# Patient Record
Sex: FEMALE | Race: WHITE | NOT HISPANIC OR LATINO | Employment: OTHER | URBAN - METROPOLITAN AREA
[De-identification: names, ages, dates, MRNs, and addresses within clinical notes are randomized per-mention and may not be internally consistent; named-entity substitution may affect disease eponyms.]

---

## 2017-10-06 ENCOUNTER — LAB VISIT (OUTPATIENT)
Dept: LAB | Facility: HOSPITAL | Age: 27
End: 2017-10-06
Attending: INTERNAL MEDICINE
Payer: MEDICAID

## 2017-10-06 DIAGNOSIS — E03.9 MYXEDEMA HEART DISEASE: Primary | ICD-10-CM

## 2017-10-06 DIAGNOSIS — I51.9 MYXEDEMA HEART DISEASE: Primary | ICD-10-CM

## 2017-10-06 LAB — TSH SERPL DL<=0.005 MIU/L-ACNC: 2.5 UIU/ML

## 2017-10-06 PROCEDURE — 84443 ASSAY THYROID STIM HORMONE: CPT

## 2017-10-06 PROCEDURE — 36415 COLL VENOUS BLD VENIPUNCTURE: CPT

## 2018-02-15 ENCOUNTER — OFFICE VISIT (OUTPATIENT)
Dept: GASTROENTEROLOGY | Facility: CLINIC | Age: 28
End: 2018-02-15
Payer: MEDICAID

## 2018-02-15 VITALS
TEMPERATURE: 98 F | BODY MASS INDEX: 44.98 KG/M2 | WEIGHT: 194.38 LBS | HEART RATE: 63 BPM | RESPIRATION RATE: 16 BRPM | HEIGHT: 55 IN

## 2018-02-15 DIAGNOSIS — K22.2 ESOPHAGEAL STRICTURE: ICD-10-CM

## 2018-02-15 DIAGNOSIS — E66.9 OBESITY, UNSPECIFIED CLASSIFICATION, UNSPECIFIED OBESITY TYPE, UNSPECIFIED WHETHER SERIOUS COMORBIDITY PRESENT: ICD-10-CM

## 2018-02-15 DIAGNOSIS — Q90.9 DOWN SYNDROME: Primary | ICD-10-CM

## 2018-02-15 DIAGNOSIS — K21.9 GASTROESOPHAGEAL REFLUX DISEASE, ESOPHAGITIS PRESENCE NOT SPECIFIED: ICD-10-CM

## 2018-02-15 PROCEDURE — 99215 OFFICE O/P EST HI 40 MIN: CPT | Mod: ,,, | Performed by: INTERNAL MEDICINE

## 2018-02-15 RX ORDER — LEVOTHYROXINE SODIUM 88 UG/1
88 TABLET ORAL
Status: ON HOLD | COMMUNITY
End: 2022-04-15 | Stop reason: HOSPADM

## 2018-03-02 NOTE — PROGRESS NOTES
[facility] Established Patient Visit    Subjective:           PCP: Cornel J. Jeansonne    Referring Provider: No ref. provider found    Chief Complaint: Follow-up       HPI:  Mallory Abadie Bradbury is a 27 y.o. female here for Refractory GERD. Patient has Down syndrome and has a lot of GI symptoms she is expected with this problem. He has no definite history of dysphagia or odynophagia however complaints of food sticking off and on. History is obtained from the parents. She is worse than before and the symptoms seem to come back. Would consider EGD with possible dilation if necessary.      ROS:   Constitutional: No fevers, chills, weight loss  ENT: No allergies, sore throat, rhinorrhea  CV: No chest pain, palpitations, edema  Pulm: No cough, shortness of breath, wheezing  Ophtho: No vision changes  GI: No blood in stools, change in bowel habits, nausea/vomiting  Denies alternating diarrhea/constipation.   Derm: No rash  Heme: No easy bruising or lymphadenopathy  MSK: No arthralgias or myalgias  : No dysuria, hematuria, frequency, polyuria, or flank tenderness  Endo: No hot or cold intolerance  Neuro: No syncope or seizure, or dizziness  Psych: No hallucination, depression or suicidal ideation      Medical History:  has a past medical history of Down's syndrome and Thyroid disease.      Surgical History:  has a past surgical history that includes Tonsillectomy; Appendectomy; and Tongue surgery.    Family History: History reviewed. No pertinent family history.    Social History:   Social History   Substance Use Topics    Smoking status: Never Smoker    Smokeless tobacco: Not on file    Alcohol use No       The patient's social and family histories were reviewed by me and updated in the appropriate section of the electronic medical record.    Allergies:   Review of patient's allergies indicates:   Allergen Reactions    Augmentin [amoxicillin-pot clavulanate] Rash    Bactrim  "[sulfamethoxazole-trimethoprim] Rash    Duricef [cefadroxil] Rash    Keflex [cephalexin] Rash    Rifampin Rash         Medications:   Current Outpatient Prescriptions   Medication Sig Dispense Refill    estradiol cypionate (DEPO-ESTRADIOL) 5 mg/mL injection Inject into the muscle every 28 days.      ibuprofen (ADVIL,MOTRIN) 400 MG tablet Take 400 mg by mouth every 4 (four) hours.      levothyroxine (SYNTHROID) 75 MCG tablet Take 75 mcg by mouth once daily.       levothyroxine (SYNTHROID) 88 MCG tablet Take 88 mcg by mouth once daily.       No current facility-administered medications for this visit.      All medications and past history have been reviewed.        Objective:        Vital Signs:  Pulse 63, temperature 97.9 °F (36.6 °C), resp. rate 16, height 4' 5" (1.346 m), weight 88.2 kg (194 lb 6.4 oz). Body mass index is 48.66 kg/m².    Physical Exam:   General Appearance: Well appearing in no acute distress, well developed, well                 nourished  Head: Normocephalic, without obvious abnormality  Eyes:  Pupils equal, round and reactive to light  Throat: Lips, mucosa, and tongue normal; teeth and gums normal  Lungs: CTA bilaterally in anterior and posterior fields, no wheezes, no crackles  Heart:  Regular rate and rhythm, no murmurs heard  Abdomen: Soft, non tender, non distended with positive bowel sounds in all four quadrants. No hepatosplenomegaly, ascites, or mass. Negative for succusion splash  : female   Extremities: No cyanosis, edema or deformity  Skin: No rash  Neurologic: Normal exam    Labs:  Lab Results   Component Value Date    WBC 6.64 10/24/2013    HGB 14.5 10/24/2013    HCT 43.8 10/24/2013     10/24/2013    ALT 18 10/24/2013    AST 17 10/24/2013     10/24/2013    K 3.9 10/24/2013     10/24/2013    CREATININE 1.0 10/24/2013    BUN 18 10/24/2013    CO2 21 (L) 10/24/2013    TSH 2.503 10/06/2017       Imaging: Reviewed    All lab results and imaging results have been " reviewed and discussed with the patient      Assessment/Plan:    Assessment:       1. Down syndrome    2. Esophageal stricture    3. Gastroesophageal reflux disease, esophagitis presence not specified    4. Obesity, unspecified classification, unspecified obesity type, unspecified whether serious comorbidity present        Plan:       Danae was seen today for follow-up.    Diagnoses and all orders for this visit:    Down syndrome  -     Ambulatory Referral to External Surgery  -     CBC auto differential; Future  -     Comprehensive metabolic panel; Future  -     C-reactive protein; Future  -     TSH; Future  -     Sedimentation rate, manual; Future  -     Phosphorus; Future  -     Magnesium; Future  -     Lipid panel; Future  -     Ferritin; Future  -     CBC auto differential  -     Comprehensive metabolic panel  -     C-reactive protein  -     TSH  -     Sedimentation rate, manual  -     Phosphorus  -     Magnesium  -     Lipid panel  -     Ferritin    Esophageal stricture  -     Ambulatory Referral to External Surgery  -     CBC auto differential; Future  -     Comprehensive metabolic panel; Future  -     C-reactive protein; Future  -     TSH; Future  -     Sedimentation rate, manual; Future  -     Phosphorus; Future  -     Magnesium; Future  -     Lipid panel; Future  -     Ferritin; Future  -     CBC auto differential  -     Comprehensive metabolic panel  -     C-reactive protein  -     TSH  -     Sedimentation rate, manual  -     Phosphorus  -     Magnesium  -     Lipid panel  -     Ferritin    Gastroesophageal reflux disease, esophagitis presence not specified  -     Ambulatory Referral to External Surgery  -     CBC auto differential; Future  -     Comprehensive metabolic panel; Future  -     C-reactive protein; Future  -     TSH; Future  -     Sedimentation rate, manual; Future  -     Phosphorus; Future  -     Magnesium; Future  -     Lipid panel; Future  -     Ferritin; Future  -     CBC auto  differential  -     Comprehensive metabolic panel  -     C-reactive protein  -     TSH  -     Sedimentation rate, manual  -     Phosphorus  -     Magnesium  -     Lipid panel  -     Ferritin    Obesity, unspecified classification, unspecified obesity type, unspecified whether serious comorbidity present  -     Ambulatory Referral to External Surgery  -     CBC auto differential; Future  -     Comprehensive metabolic panel; Future  -     C-reactive protein; Future  -     TSH; Future  -     Sedimentation rate, manual; Future  -     Phosphorus; Future  -     Magnesium; Future  -     Lipid panel; Future  -     Ferritin; Future  -     CBC auto differential  -     Comprehensive metabolic panel  -     C-reactive protein  -     TSH  -     Sedimentation rate, manual  -     Phosphorus  -     Magnesium  -     Lipid panel  -     Ferritin        See HPI    Follow-up in about 3 months (around 5/15/2018).    The plan was discussed with the patient and all questions/concerns have been answered to the patient's satisfaction.        Electronically signed by Violette Garrett MD    This note was dictated using voice recognition software and may contain grammatical errors.

## 2018-05-04 LAB
ALBUMIN SERPL-MCNC: 3.8 G/DL (ref 3.1–4.7)
ALP SERPL-CCNC: 84 IU/L (ref 40–104)
ALT (SGPT): 17 IU/L (ref 3–33)
AST SERPL-CCNC: 19 IU/L (ref 10–40)
B-HCG UR QL: NEGATIVE
BASOPHILS NFR BLD: 0.1 K/UL (ref 0–0.2)
BASOPHILS NFR BLD: 1.2 %
BILIRUB SERPL-MCNC: 0.9 MG/DL (ref 0.3–1)
BUN SERPL-MCNC: 9 MG/DL (ref 8–20)
CALCIUM SERPL-MCNC: 8.9 MG/DL (ref 7.7–10.4)
CHLORIDE: 107 MMOL/L (ref 98–110)
CO2 SERPL-SCNC: 22.2 MMOL/L (ref 22.8–31.6)
CREATININE: 1.12 MG/DL (ref 0.6–1.4)
CRP SERPL-MCNC: 2.49 MG/DL (ref 0–1.4)
EOSINOPHIL NFR BLD: 0.1 K/UL (ref 0–0.7)
EOSINOPHIL NFR BLD: 1.8 %
ERYTHROCYTE [DISTWIDTH] IN BLOOD BY AUTOMATED COUNT: 14 % (ref 11.7–14.9)
FERRITIN SERPL-MCNC: 88 NG/ML (ref 24–162)
GLUCOSE: 75 MG/DL (ref 70–99)
GRAN #: 4.9 K/UL (ref 1.4–6.5)
GRAN%: 66.6 %
HCT VFR BLD AUTO: 45.7 % (ref 36–48)
HGB BLD-MCNC: 14.9 G/DL (ref 12–15)
IMMATURE GRANS (ABS): 0 K/UL (ref 0–1)
IMMATURE GRANULOCYTES: 0.4 %
LYMPH #: 1.8 K/UL (ref 1.2–3.4)
LYMPH%: 24.4 %
MAGNESIUM SERPL-MCNC: 2.1 MG/DL (ref 1.5–2.6)
MCH RBC QN AUTO: 32.6 PG (ref 25–35)
MCHC RBC AUTO-ENTMCNC: 32.6 G/DL (ref 31–36)
MCV RBC AUTO: 100 FL (ref 79–98)
MONO #: 0.4 K/UL (ref 0.1–0.6)
MONO%: 5.6 %
NUCLEATED RBCS: 0 %
PHOSPHATE FLD-MCNC: 4 MG/DL (ref 2.5–4.9)
PLATELET # BLD AUTO: 242 K/UL (ref 140–440)
PMV BLD AUTO: 9.4 FL (ref 8.8–12.7)
POTASSIUM SERPL-SCNC: 4 MMOL/L (ref 3.5–5)
PROT SERPL-MCNC: 7.2 G/DL (ref 6–8.2)
RBC # BLD AUTO: 4.57 M/UL (ref 3.5–5.5)
SODIUM: 138 MMOL/L (ref 134–144)
TSH SERPL DL<=0.005 MIU/L-ACNC: 3.6 ULU/ML (ref 0.3–5.6)
WBC # BLD AUTO: 7.3 K/UL (ref 5–10)

## 2018-06-19 ENCOUNTER — OFFICE VISIT (OUTPATIENT)
Dept: GASTROENTEROLOGY | Facility: CLINIC | Age: 28
End: 2018-06-19
Payer: MEDICAID

## 2018-06-19 VITALS
WEIGHT: 194.38 LBS | HEART RATE: 63 BPM | BODY MASS INDEX: 44.98 KG/M2 | SYSTOLIC BLOOD PRESSURE: 124 MMHG | TEMPERATURE: 98 F | HEIGHT: 55 IN | DIASTOLIC BLOOD PRESSURE: 64 MMHG

## 2018-06-19 DIAGNOSIS — R13.10 DYSPHAGIA, UNSPECIFIED TYPE: Primary | ICD-10-CM

## 2018-06-19 DIAGNOSIS — E66.9 OBESITY, UNSPECIFIED CLASSIFICATION, UNSPECIFIED OBESITY TYPE, UNSPECIFIED WHETHER SERIOUS COMORBIDITY PRESENT: ICD-10-CM

## 2018-06-19 DIAGNOSIS — K21.9 GASTROESOPHAGEAL REFLUX DISEASE, ESOPHAGITIS PRESENCE NOT SPECIFIED: ICD-10-CM

## 2018-06-19 DIAGNOSIS — Q90.9 DOWN SYNDROME: ICD-10-CM

## 2018-06-19 PROCEDURE — 99215 OFFICE O/P EST HI 40 MIN: CPT | Mod: ,,, | Performed by: INTERNAL MEDICINE

## 2018-06-19 RX ORDER — ESCITALOPRAM OXALATE 20 MG/1
1 TABLET ORAL DAILY
COMMUNITY

## 2018-06-19 NOTE — PROGRESS NOTES
[facility] Established Patient Visit    Subjective:           PCP: Cornel J. Jeansonne    Chief Complaint: Follow-up (lab results)       HPI:  Mallory Abadie Bradbury is a 28 y.o. female here for       ROS:   Constitutional: No fevers, chills, weight loss  ENT: No allergies, sore throat, rhinorrhea  CV: No chest pain, palpitations, edema  Pulm: No cough, shortness of breath, wheezing  Ophtho: No vision changes  GI: No blood in stools, change in bowel habits, nausea/vomiting  Denies alternating diarrhea/constipation.   Derm: No rash  Heme: No easy bruising or lymphadenopathy  MSK: No arthralgias or myalgias  : No dysuria, hematuria, frequency, polyuria, or flank tenderness  Endo: No hot or cold intolerance  Neuro: No syncope or seizure, or dizziness  Psych: No hallucination, depression or suicidal ideation      Medical History:  has a past medical history of Down's syndrome and Thyroid disease.      Surgical History:  has a past surgical history that includes Tonsillectomy; Appendectomy; and Tongue surgery.    Family History: History reviewed. No pertinent family history.    Social History:   Social History   Substance Use Topics    Smoking status: Never Smoker    Smokeless tobacco: Not on file    Alcohol use No       The patient's social and family histories were reviewed by me and updated in the appropriate section of the electronic medical record.    Allergies:   Review of patient's allergies indicates:   Allergen Reactions    Amoxicillin Hives    Augmentin [amoxicillin-pot clavulanate] Rash    Bactrim [sulfamethoxazole-trimethoprim] Rash    Duricef [cefadroxil] Rash    Keflex [cephalexin] Rash    Rifampin Rash         Medications:   Current Outpatient Prescriptions   Medication Sig Dispense Refill    escitalopram oxalate (LEXAPRO) 20 MG tablet Take 1 tablet by mouth once daily.      estradiol cypionate (DEPO-ESTRADIOL) 5 mg/mL injection Inject into the muscle every 28 days.       "ibuprofen (ADVIL,MOTRIN) 400 MG tablet Take 400 mg by mouth every 4 (four) hours.      levothyroxine (SYNTHROID) 75 MCG tablet Take 75 mcg by mouth once daily.       levothyroxine (SYNTHROID) 88 MCG tablet Take 88 mcg by mouth once daily.       No current facility-administered medications for this visit.      All medications and past history have been reviewed.        Objective:        Vital Signs:  Blood pressure 124/64, pulse 63, temperature 97.9 °F (36.6 °C), height 4' 5" (1.346 m), weight 88.2 kg (194 lb 6.4 oz). Body mass index is 48.66 kg/m².    Physical Exam:   General Appearance: Well appearing in no acute distress, well developed, well                nourished  Head: Normocephalic, without obvious abnormality  Eyes:  Pupils equal, round and reactive to light  Throat: Lips, mucosa, and tongue normal; teeth and gums normal  Lungs: CTA bilaterally in anterior and posterior fields, no wheezes, no crackles  Heart:  Regular rate and rhythm, no murmurs heard  Abdomen: Soft, non tender, non distended with positive bowel sounds in all four quadrants. No hepatosplenomegaly, ascites, or mass. Negative for succusion splash  : female  No hernia  Extremities: No cyanosis, edema or deformity  Skin: No rash  Neurologic: Normal exam    Labs:  Lab Results   Component Value Date    WBC 7.3 05/04/2018    HGB 14.9 05/04/2018    HCT 45.7 05/04/2018     05/04/2018    ALT 18 10/24/2013    AST 19 05/04/2018     05/04/2018    K 4.0 05/04/2018     05/04/2018    CREATININE 1.12 05/04/2018    BUN 9 05/04/2018    CO2 22.2 (L) 05/04/2018    TSH 3.60 05/04/2018       Imaging:     All lab results and imaging results have been reviewed and discussed with the patient      Assessment:       1. Dysphagia, unspecified type    2. Gastroesophageal reflux disease, esophagitis presence not specified    3. Down syndrome    4. Obesity, unspecified classification, unspecified obesity type, unspecified whether serious comorbidity " present        Plan:       Danae was seen today for follow-up.    Diagnoses and all orders for this visit:    Dysphagia, unspecified type  -     FL Upper GI With Small Bowel    Gastroesophageal reflux disease, esophagitis presence not specified  -     FL Upper GI With Small Bowel    Down syndrome  -     FL Upper GI With Small Bowel    Obesity, unspecified classification, unspecified obesity type, unspecified whether serious comorbidity present  -     FL Upper GI With Small Bowel        See HPI    No Follow-up on file.    The plan was discussed with the patient and all questions/concerns have been answered to the patient's satisfaction.        Electronically signed by Violette Garrett MD    This note was dictated using voice recognition software and may contain grammatical errors.

## 2018-07-10 ENCOUNTER — TELEPHONE (OUTPATIENT)
Dept: GASTROENTEROLOGY | Facility: CLINIC | Age: 28
End: 2018-07-10

## 2018-07-10 NOTE — TELEPHONE ENCOUNTER
----- Message from Hailee Cavanaugh sent at 7/10/2018  4:38 PM CDT -----  Contact: mother  pts mom said dr goodwin wants her to have another egd w/anes now.  Dr goodwin said he wants upper gi done.  I have to call the mother back.  What does dr goodwin want the patient to have done?

## 2018-07-16 ENCOUNTER — TELEPHONE (OUTPATIENT)
Dept: GASTROENTEROLOGY | Facility: CLINIC | Age: 28
End: 2018-07-16

## 2018-07-16 NOTE — TELEPHONE ENCOUNTER
----- Message from Jazzmine Mejia sent at 7/16/2018  1:15 PM CDT -----  Contact: giorgi shirley - father - [phone]  janice hernandez setting up appt - please call giorgi shirley - father - [phone]

## 2018-09-25 LAB
BACTERIA UR CULT: ABNORMAL
BACTERIA UR CULT: ABNORMAL
OTHER ANTIBIOTIC SUSC ISLT: ABNORMAL

## 2018-09-28 ENCOUNTER — LAB VISIT (OUTPATIENT)
Dept: LAB | Facility: HOSPITAL | Age: 28
End: 2018-09-28
Attending: INTERNAL MEDICINE
Payer: MEDICAID

## 2018-09-28 DIAGNOSIS — E03.9 MYXEDEMA HEART DISEASE: Primary | ICD-10-CM

## 2018-09-28 DIAGNOSIS — I51.9 MYXEDEMA HEART DISEASE: Primary | ICD-10-CM

## 2018-09-28 LAB — TSH SERPL DL<=0.005 MIU/L-ACNC: 2.29 UIU/ML

## 2018-09-28 PROCEDURE — 84443 ASSAY THYROID STIM HORMONE: CPT

## 2018-09-28 PROCEDURE — 36415 COLL VENOUS BLD VENIPUNCTURE: CPT

## 2018-10-04 ENCOUNTER — OFFICE VISIT (OUTPATIENT)
Dept: GASTROENTEROLOGY | Facility: CLINIC | Age: 28
End: 2018-10-04
Payer: MEDICAID

## 2018-10-04 VITALS
RESPIRATION RATE: 18 BRPM | TEMPERATURE: 99 F | WEIGHT: 190.63 LBS | DIASTOLIC BLOOD PRESSURE: 64 MMHG | HEIGHT: 55 IN | BODY MASS INDEX: 44.12 KG/M2 | HEART RATE: 61 BPM | SYSTOLIC BLOOD PRESSURE: 120 MMHG

## 2018-10-04 DIAGNOSIS — E07.9 THYROID DISEASE: ICD-10-CM

## 2018-10-04 DIAGNOSIS — Q90.9 DOWN SYNDROME: ICD-10-CM

## 2018-10-04 DIAGNOSIS — K22.2 ESOPHAGEAL STRICTURE: Primary | ICD-10-CM

## 2018-10-04 DIAGNOSIS — E66.9 OBESITY, UNSPECIFIED CLASSIFICATION, UNSPECIFIED OBESITY TYPE, UNSPECIFIED WHETHER SERIOUS COMORBIDITY PRESENT: ICD-10-CM

## 2018-10-04 PROCEDURE — 99215 OFFICE O/P EST HI 40 MIN: CPT | Mod: ,,, | Performed by: INTERNAL MEDICINE

## 2018-10-04 RX ORDER — OMEPRAZOLE 40 MG/1
40 CAPSULE, DELAYED RELEASE ORAL DAILY
COMMUNITY
End: 2020-07-09 | Stop reason: ALTCHOICE

## 2018-10-04 NOTE — PROGRESS NOTES
[facility] Established Patient Visit    Subjective:           PCP: Cornel J. Jeansonne    Chief Complaint: Follow-up       HPI:  Mallory Abadie Bradbury is a 28 y.o. female here for evaluation of dysphagia. Has had a good response to the previous dilation. She may need this periodically as the symptoms are coming back. She gets food stuck periodically and she had to go to the emergency room. She has been recently treated for UTI. Consider repeat dilation in future.    ROS:   Constitutional: No fevers, chills, weight loss  ENT: No allergies, sore throat, rhinorrhea  CV: No chest pain, palpitations, edema  Pulm: No cough, shortness of breath, wheezing  Ophtho: No vision changes  GI: No blood in stools, change in bowel habits, nausea/vomiting  Denies alternating diarrhea/constipation.   Derm: No rash  Heme: No easy bruising or lymphadenopathy  MSK: No arthralgias or myalgias  : No dysuria, hematuria, frequency, polyuria, or flank tenderness  Endo: No hot or cold intolerance  Neuro: No syncope or seizure, or dizziness  Psych: No hallucination, depression or suicidal ideation      Medical History:  has a past medical history of Down's syndrome and Thyroid disease.      Surgical History:  has a past surgical history that includes Tonsillectomy; Appendectomy; and Tongue surgery.    Family History: No family history on file.    Social History:   Social History     Tobacco Use    Smoking status: Never Smoker   Substance Use Topics    Alcohol use: No    Drug use: Not on file       The patient's social and family histories were reviewed by me and updated in the appropriate section of the electronic medical record.    Allergies:   Review of patient's allergies indicates:   Allergen Reactions    Amoxicillin Hives    Augmentin [amoxicillin-pot clavulanate] Rash    Bactrim [sulfamethoxazole-trimethoprim] Rash    Duricef [cefadroxil] Rash    Keflex [cephalexin] Rash    Rifampin Rash         Medications:  "  Current Outpatient Medications   Medication Sig Dispense Refill    escitalopram oxalate (LEXAPRO) 20 MG tablet Take 1 tablet by mouth once daily.      estradiol cypionate (DEPO-ESTRADIOL) 5 mg/mL injection Inject into the muscle every 28 days.      ibuprofen (ADVIL,MOTRIN) 400 MG tablet Take 400 mg by mouth every 4 (four) hours.      levothyroxine (SYNTHROID) 75 MCG tablet Take 75 mcg by mouth once daily.       levothyroxine (SYNTHROID) 88 MCG tablet Take 88 mcg by mouth once daily.       No current facility-administered medications for this visit.      All medications and past history have been reviewed.        Objective:        Vital Signs:  Blood pressure 120/64, pulse 61, temperature 99.1 °F (37.3 °C), resp. rate 18, height 4' 5" (1.346 m), weight 86.5 kg (190 lb 9.6 oz). Body mass index is 47.71 kg/m².    Physical Exam:   General Appearance: Well appearing in no acute distress, well developed, well                nourished  Head: Normocephalic, without obvious abnormality  Eyes:  Pupils equal, round and reactive to light  Throat: Lips, mucosa, and tongue normal; teeth and gums normal  Lungs: CTA bilaterally in anterior and posterior fields, no wheezes, no crackles  Heart:  Regular rate and rhythm, no murmurs heard  Abdomen: Soft, non tender, non distended with positive bowel sounds in all four quadrants. No hepatosplenomegaly, ascites, or mass. Negative for succusion splash  : female   Extremities: No cyanosis, edema or deformity  Skin: No rash  Neurologic: Normal exam    Labs:  Lab Results   Component Value Date    WBC 7.3 05/04/2018    HGB 14.9 05/04/2018    HCT 45.7 05/04/2018     05/04/2018    ALT 18 10/24/2013    AST 19 05/04/2018     05/04/2018    K 4.0 05/04/2018     05/04/2018    CREATININE 1.12 05/04/2018    BUN 9 05/04/2018    CO2 22.2 (L) 05/04/2018    TSH 2.293 09/28/2018       Imaging:     All lab results and imaging results have been reviewed and discussed with the " patient      Assessment:       No diagnosis found.      1. Esophageal stricture  2. Down's syndrome  See visit diagnoses  Plan:       There are no diagnoses linked to this encounter.    See HPI    No Follow-up on file.    The plan was discussed with the patient and all questions/concerns have been answered to the patient's satisfaction.        Electronically signed by Violette Garrett MD    This note was dictated using voice recognition software and may contain grammatical errors.

## 2018-10-08 ENCOUNTER — TELEPHONE (OUTPATIENT)
Dept: GASTROENTEROLOGY | Facility: CLINIC | Age: 28
End: 2018-10-08

## 2018-10-08 NOTE — TELEPHONE ENCOUNTER
Deedee Bradford MA             PER RUTH [place] [employer] IS RESPONSIBLE FOR MEDICAL, PT HAS [facility] ONLY FOR BEHAVIORAL , PER RUTH ADVISED THE  EGD IS APPRVED ONLY IF DONE OUT PT RUTH ADVISED THE  [address] ANETHESIA PORTION  IS NOT COVERED , PLEASE ADVISE MEMBER OR FAMILY AS WELL AS DR AGUDELO          Received a call back from Deedee. Patient medicaid is [employer] which is the [place]icaid. The EGD will be cover but not the anesthesia. Will patient be able to have procedure done without it?

## 2018-10-08 NOTE — TELEPHONE ENCOUNTER
Deedee Emery Staff             Per Bella @ [facility] called and ad Dr Garrett is out if network with pt Ins and Dr Garrett cannot be the servicing and rendering MD, Nawaf Blanco advised  to have member call her PCP to request a out of network provider or Dr Garrett for the services requested , this message was also sent to Dr Gerry rodrigez in basket message          I spoke with Lizz in the pre service department at [facility] and she explained to me that [facility] is only for mental health. Patient has to use [place] medicaid for all other services. I called pre service here and Deedee will run it through with the correct number.

## 2018-11-05 NOTE — TELEPHONE ENCOUNTER
Patients mom (Mallory) called on 11/2/18.  Would like to know if the Egd on 11/9/18 is all set.  Will the anesthesia be covered?

## 2018-11-06 NOTE — TELEPHONE ENCOUNTER
BRAIN SPOKE TO MEDICAID ON 11/6/18.  THE CORRECT ID# IS [id number].  DR AGUDELO NEEDS TO CALL [phone] AND SPEAK TO SOMEONE IN PROFESSIONAL [employer]GRAMS FOR THE PEER TO PEER.

## 2018-11-07 NOTE — TELEPHONE ENCOUNTER
I left out a number on the previous message.  The correct ID is [id number].  Dr Garrett called the [facility] Dept, no one answered.  He then called Provider Support for medicaid at [phone].  We spoke to Lian who told us that as long as the procedure (EGD) was done as outpatient, the anesthesia would be covered.  Ref # is LIAN 11/7/2018.

## 2018-11-09 LAB
BASOPHILS NFR BLD: 0.1 K/UL (ref 0–0.2)
BASOPHILS NFR BLD: 1.6 %
BUN SERPL-MCNC: 8 MG/DL (ref 8–20)
CALCIUM SERPL-MCNC: 9 MG/DL (ref 7.7–10.4)
CHLORIDE: 105 MMOL/L (ref 98–110)
CO2 SERPL-SCNC: 26.2 MMOL/L (ref 22.8–31.6)
CREATININE: 1.23 MG/DL (ref 0.6–1.4)
EOSINOPHIL NFR BLD: 0.1 K/UL (ref 0–0.7)
EOSINOPHIL NFR BLD: 1.1 %
ERYTHROCYTE [DISTWIDTH] IN BLOOD BY AUTOMATED COUNT: 13.4 % (ref 11.7–14.9)
GLUCOSE: 85 MG/DL (ref 70–99)
GRAN #: 3.6 K/UL (ref 1.4–6.5)
GRAN%: 58.5 %
HCT VFR BLD AUTO: 46.5 % (ref 36–48)
HGB BLD-MCNC: 15.2 G/DL (ref 12–15)
IMMATURE GRANS (ABS): 0 K/UL (ref 0–1)
IMMATURE GRANULOCYTES: 0.3 %
LYMPH #: 2 K/UL (ref 1.2–3.4)
LYMPH%: 32 %
MCH RBC QN AUTO: 33.4 PG (ref 25–35)
MCHC RBC AUTO-ENTMCNC: 32.7 G/DL (ref 31–36)
MCV RBC AUTO: 102.2 FL (ref 79–98)
MONO #: 0.4 K/UL (ref 0.1–0.6)
MONO%: 6.5 %
NUCLEATED RBCS: 0 %
PLATELET # BLD AUTO: 257 K/UL (ref 140–440)
PMV BLD AUTO: 9.4 FL (ref 8.8–12.7)
POTASSIUM SERPL-SCNC: 4.1 MMOL/L (ref 3.5–5)
RBC # BLD AUTO: 4.55 M/UL (ref 3.5–5.5)
SODIUM: 139 MMOL/L (ref 134–144)
WBC # BLD AUTO: 6.2 K/UL (ref 5–10)

## 2018-12-23 ENCOUNTER — CLINICAL SUPPORT (OUTPATIENT)
Dept: URGENT CARE | Facility: CLINIC | Age: 28
End: 2018-12-23
Payer: MEDICAID

## 2018-12-23 VITALS
SYSTOLIC BLOOD PRESSURE: 105 MMHG | DIASTOLIC BLOOD PRESSURE: 65 MMHG | TEMPERATURE: 98 F | HEIGHT: 63 IN | WEIGHT: 190 LBS | RESPIRATION RATE: 14 BRPM | BODY MASS INDEX: 33.66 KG/M2 | OXYGEN SATURATION: 98 % | HEART RATE: 64 BPM

## 2018-12-23 DIAGNOSIS — H65.92 OME (OTITIS MEDIA WITH EFFUSION), LEFT: Primary | ICD-10-CM

## 2018-12-23 PROCEDURE — 99204 OFFICE O/P NEW MOD 45 MIN: CPT | Mod: S$GLB,,, | Performed by: NURSE PRACTITIONER

## 2018-12-23 RX ORDER — CETIRIZINE HYDROCHLORIDE 1 MG/ML
SOLUTION ORAL DAILY
COMMUNITY
End: 2022-04-15

## 2018-12-23 RX ORDER — AZITHROMYCIN 250 MG/1
TABLET, FILM COATED ORAL
Qty: 6 TABLET | Refills: 0 | Status: SHIPPED | OUTPATIENT
Start: 2018-12-23 | End: 2019-11-25 | Stop reason: CLARIF

## 2018-12-23 RX ORDER — MUPIROCIN 20 MG/G
OINTMENT TOPICAL
Qty: 22 G | Refills: 1 | Status: SHIPPED | OUTPATIENT
Start: 2018-12-23 | End: 2019-11-25 | Stop reason: CLARIF

## 2018-12-23 NOTE — PROGRESS NOTES
"Subjective:       Patient ID: Mallory Abadie Bradbury is a 28 y.o. female.    Vitals:  height is 5' 3" (1.6 m) and weight is 86.2 kg (190 lb). Her oral temperature is 97.7 °F (36.5 °C). Her blood pressure is 105/65 and her pulse is 64. Her respiration is 14 and oxygen saturation is 98%.     Chief Complaint: No chief complaint on file.    Pt presents with parents at  for left ear pain. Parents states pt was c/o left ear pain approx 1 month ago. Pain improved but over the past 2 days pt is stating her ear is hurting. Pt unable to provide specific details regarding ear pain. Parents deny f/c/n/v.         Constitution: Negative for chills, sweating, fatigue and fever.   HENT: Positive for ear pain. Negative for congestion, sinus pain, sinus pressure, sore throat and voice change.    Neck: Negative for painful lymph nodes.   Eyes: Negative for eye redness.   Respiratory: Negative for chest tightness, cough, sputum production, bloody sputum, COPD, shortness of breath, stridor, wheezing and asthma.    Gastrointestinal: Negative for nausea and vomiting.   Musculoskeletal: Negative for muscle ache.   Skin: Negative for rash.   Allergic/Immunologic: Negative for seasonal allergies and asthma.   Hematologic/Lymphatic: Negative for swollen lymph nodes.       Objective:      Physical Exam   Constitutional: She is oriented to person, place, and time. She appears well-developed and well-nourished. She is cooperative.  Non-toxic appearance. She does not appear ill. No distress.   HENT:   Head: Normocephalic and atraumatic.   Right Ear: Hearing, tympanic membrane, external ear and ear canal normal.   Left Ear: Hearing, external ear and ear canal normal. Tympanic membrane is erythematous.   Nose: Nose normal. No mucosal edema, rhinorrhea or nasal deformity. No epistaxis. Right sinus exhibits no maxillary sinus tenderness and no frontal sinus tenderness. Left sinus exhibits no maxillary sinus tenderness and no frontal sinus " tenderness.   Mouth/Throat: Uvula is midline, oropharynx is clear and moist and mucous membranes are normal. No trismus in the jaw. Normal dentition. No uvula swelling. No posterior oropharyngeal erythema.   bilat cerumen   Eyes: Conjunctivae and lids are normal. No scleral icterus.   Sclera clear bilat   Neck: Trachea normal, full passive range of motion without pain and phonation normal. Neck supple.   Cardiovascular: Normal rate, regular rhythm, normal heart sounds, intact distal pulses and normal pulses.   Pulmonary/Chest: Effort normal and breath sounds normal. No respiratory distress.   Abdominal: Soft. Normal appearance and bowel sounds are normal. She exhibits no distension. There is no tenderness.   Musculoskeletal: Normal range of motion. She exhibits no edema or deformity.   Neurological: She is alert and oriented to person, place, and time. She exhibits normal muscle tone. Coordination normal.   Skin: Skin is warm, dry and intact. She is not diaphoretic. No pallor.   Psychiatric: She has a normal mood and affect. Her speech is normal and behavior is normal. Judgment and thought content normal. Cognition and memory are normal.   Nursing note and vitals reviewed.      Assessment:       1. OME (otitis media with effusion), left        Plan:         OME (otitis media with effusion), left    Other orders  -     azithromycin (Z-PHILIP) 250 MG tablet; Take 2 tablets by mouth on day 1; Take 1 tablet by mouth on days 2-5  Dispense: 6 tablet; Refill: 0

## 2018-12-23 NOTE — PATIENT INSTRUCTIONS
Middle Ear Infection (Adult)  You have an infection of the middle ear, the space behind the eardrum. This is also called acute otitis media (AOM). Sometimes it is caused by the common cold. This is because congestion can block the internal passage (eustachian tube) that drains fluid from the middle ear. When the middle ear fills with fluid, bacteria can grow there and cause an infection. Oral antibiotics are used to treat this illness, not ear drops. Symptoms usually start to improve within 1 to 2 days of treatment.    Home care  The following are general care guidelines:  · Finish all of the antibiotic medicine given, even though you may feel better after the first few days.  · You may use over-the-counter medicine, such as acetaminophen or ibuprofen, to control pain and fever, unless something else was prescribed. If you have chronic liver or kidney disease or have ever had a stomach ulcer or gastrointestinal bleeding, talk with your healthcare provider before using these medicines. Do not give aspirin to anyone under 18 years of age who has a fever. It may cause severe illness or death.  Follow-up care  Follow up with your healthcare provider, or as advised, in 2 weeks if all symptoms have not gotten better, or if hearing doesn't go back to normal within 1 month.  When to seek medical advice  Call your healthcare provider right away if any of these occur:  · Ear pain gets worse or does not improve after 3 days of treatment  · Unusual drowsiness or confusion  · Neck pain, stiff neck, or headache  · Fluid or blood draining from the ear canal  · Fever of 100.4°F (38°C) or as advised   · Seizure  Date Last Reviewed: 6/1/2016  © 6450-7910 [employer]. [address] [place] [address], [place] [address]. All rights reserved. This information is not intended as a substitute for professional medical care. Always follow your healthcare professional's instructions.

## 2019-05-15 ENCOUNTER — TELEPHONE (OUTPATIENT)
Dept: GASTROENTEROLOGY | Facility: CLINIC | Age: 29
End: 2019-05-15

## 2019-05-15 NOTE — TELEPHONE ENCOUNTER
----- Message from Hailee Cavanaugh sent at 5/15/2019  8:51 AM CDT -----  Contact: tom-mom  Ms. Tejada feels that its time for Danae to have her esophagus stretched again.  Last EGD was 11/9/18 and last office visit was 10/4/18.  Can this be scheduled or does Dr. Garrett want to see her first.  First available appt is the end of June.

## 2019-05-30 ENCOUNTER — OFFICE VISIT (OUTPATIENT)
Dept: PODIATRY | Facility: CLINIC | Age: 29
End: 2019-05-30
Payer: MEDICAID

## 2019-05-30 VITALS
WEIGHT: 198 LBS | BODY MASS INDEX: 35.08 KG/M2 | SYSTOLIC BLOOD PRESSURE: 116 MMHG | RESPIRATION RATE: 22 BRPM | DIASTOLIC BLOOD PRESSURE: 74 MMHG | HEIGHT: 63 IN

## 2019-05-30 DIAGNOSIS — L84 CORN OR CALLUS: Primary | ICD-10-CM

## 2019-05-30 DIAGNOSIS — M79.672 BILATERAL FOOT PAIN: ICD-10-CM

## 2019-05-30 DIAGNOSIS — M79.671 BILATERAL FOOT PAIN: ICD-10-CM

## 2019-05-30 PROCEDURE — 99213 OFFICE O/P EST LOW 20 MIN: CPT | Mod: ,,, | Performed by: PODIATRIST

## 2019-05-30 PROCEDURE — 99213 PR OFFICE/OUTPT VISIT, EST, LEVL III, 20-29 MIN: ICD-10-PCS | Mod: ,,, | Performed by: PODIATRIST

## 2019-05-30 PROCEDURE — 99070 SPECIAL SUPPLIES PHYS/QHP: CPT | Mod: CSM,,, | Performed by: PODIATRIST

## 2019-05-30 PROCEDURE — 99070 PR SPECIAL SUPPLIES: ICD-10-PCS | Mod: CSM,,, | Performed by: PODIATRIST

## 2019-05-30 RX ORDER — HYDROCORTISONE 25 MG/G
CREAM TOPICAL
Refills: 1 | Status: ON HOLD | COMMUNITY
Start: 2019-05-23 | End: 2023-10-01 | Stop reason: HOSPADM

## 2019-05-30 RX ORDER — MEDROXYPROGESTERONE ACETATE 150 MG/ML
INJECTION, SUSPENSION INTRAMUSCULAR
Refills: 2 | COMMUNITY
Start: 2019-03-26

## 2019-05-30 NOTE — PROGRESS NOTES
[address] [facility] [address]  [place] [address]  Phone: [phone]   [phone]    Patient's PCP:Cornel J. Jeansonne, MD  Referring Provider: No ref. provider found    Subjective:      Chief Complaint:: Routine Foot Care (bilateral callouses)    HPI Mallory Abadie Bradbury is a 29 y.o. female who presents with a complaint of  Bilateral painful callouses lasting for months. Onset of the symptoms was how patient walks.  Current symptoms include pain.  Aggravating factors are bearing weight. Treatment to date have included periodic debridement. Patients rates pain 5/10 on pain scale.        Vitals:    05/30/19 1542   BP: 116/74   Resp: (!) 22     Shoe Size: 2     Past Surgical History:   Procedure Laterality Date    APPENDECTOMY      esposgus      TONGUE SURGERY      TONSILLECTOMY       Past Medical History:   Diagnosis Date    Down's syndrome     Thyroid disease      No family history on file.     Social History:   Marital Status: Single  Alcohol History:  reports that she does not drink alcohol.  Tobacco History:  reports that she has never smoked. She does not have any smokeless tobacco history on file.  Drug History:  has no drug history on file.    Review of patient's allergies indicates:   Allergen Reactions    Amoxicillin Hives    Augmentin [amoxicillin-pot clavulanate] Rash    Bactrim [sulfamethoxazole-trimethoprim] Rash    Duricef [cefadroxil] Rash    Keflex [cephalexin] Rash    Rifampin Rash       Current Outpatient Medications   Medication Sig Dispense Refill    azithromycin (Z-PHILIP) 250 MG tablet Take 2 tablets by mouth on day 1; Take 1 tablet by mouth on days 2-5 6 tablet 0    cetirizine (ZYRTEC) 1 mg/mL syrup Take by mouth once daily.      escitalopram oxalate (LEXAPRO) 20 MG tablet Take 1 tablet by mouth once daily.      estradiol cypionate (DEPO-ESTRADIOL) 5 mg/mL injection Inject into the muscle every 28 days.      hydrocortisone 2.5 % cream EMEKA SML AMT EXT AA BID  1     ibuprofen (ADVIL,MOTRIN) 400 MG tablet Take 400 mg by mouth every 4 (four) hours.      levothyroxine (SYNTHROID) 75 MCG tablet Take 75 mcg by mouth once daily.       levothyroxine (SYNTHROID) 88 MCG tablet Take 88 mcg by mouth once daily.      medroxyPROGESTERone (DEPO-PROVERA) 150 mg/mL injection   2    mupirocin (BACTROBAN) 2 % ointment Apply to affected area 3 times daily 22 g 1    omeprazole (PRILOSEC) 40 MG capsule Take 40 mg by mouth once daily.       No current facility-administered medications for this visit.        Review of Systems   Constitutional: Negative for chills, fatigue, fever and unexpected weight change.   HENT: Negative for hearing loss and trouble swallowing.    Eyes: Negative for photophobia and visual disturbance.   Respiratory: Negative for cough, shortness of breath and wheezing.    Cardiovascular: Negative for chest pain, palpitations and leg swelling.   Gastrointestinal: Negative for abdominal pain and nausea.   Genitourinary: Negative for dysuria and frequency.   Musculoskeletal: Positive for arthralgias and back pain. Negative for joint swelling.   Skin: Negative for rash.   Neurological: Positive for weakness. Negative for tremors, seizures, numbness and headaches.   Hematological: Does not bruise/bleed easily.         Objective:        Physical Exam:   Foot Exam    General  General Appearance: appears stated age and healthy   Orientation: alert and oriented to person, place, and time   Affect: appropriate   Gait: unimpaired       Right Foot/Ankle     Neurovascular  Dorsalis pedis: 2+  Posterior tibial: 2+      Left Foot/Ankle      Neurovascular  Dorsalis pedis: 2+  Posterior tibial: 2+          Physical Exam   Cardiovascular:   Pulses:       Dorsalis pedis pulses are 2+ on the right side, and 2+ on the left side.        Posterior tibial pulses are 2+ on the right side, and 2+ on the left side.   Musculoskeletal:        Feet:        Imaging:            Assessment:       1. Corn or  callus - Right Foot    2. Bilateral foot pain      Plan:   Corn or callus - Right Foot    Bilateral foot pain    Today we discussed the treatment and patient family will try the wart stick at home and periodic pedicure of the area and will return as needed.  Follow up if symptoms worsen or fail to improve.    Procedures - None    Counseling:    benign skin lesion:  I explained the lesion to the pt. and the treatment options of 1)  periodic debridement and off loading of the lesion.  2)  Canthrone treatment plan,  3)  Curretage of the lesion.  I explained there was no guarantee with any of the treatments that the lesion would comletely resolve or not reoccur.  I provided patient education verbally regarding:   Patient diagnosis, treatment options, as well as alternatives, risks, and benefits.     This note was created using Dragon voice recognition software that occasionally misinterpreted phrases or words.

## 2019-07-18 ENCOUNTER — CLINICAL SUPPORT (OUTPATIENT)
Dept: URGENT CARE | Facility: CLINIC | Age: 29
End: 2019-07-18
Payer: MEDICAID

## 2019-07-18 VITALS
SYSTOLIC BLOOD PRESSURE: 93 MMHG | DIASTOLIC BLOOD PRESSURE: 63 MMHG | TEMPERATURE: 99 F | OXYGEN SATURATION: 99 % | HEART RATE: 63 BPM | RESPIRATION RATE: 12 BRPM

## 2019-07-18 DIAGNOSIS — R21 RASH OF BACK: ICD-10-CM

## 2019-07-18 DIAGNOSIS — M54.9 BACK PAIN, UNSPECIFIED BACK LOCATION, UNSPECIFIED BACK PAIN LATERALITY, UNSPECIFIED CHRONICITY: Primary | ICD-10-CM

## 2019-07-18 DIAGNOSIS — M25.551 HIP PAIN, ACUTE, RIGHT: ICD-10-CM

## 2019-07-18 LAB
BILIRUB UR QL STRIP: NEGATIVE
GLUCOSE UR QL STRIP: NEGATIVE
KETONES UR QL STRIP: NEGATIVE
LEUKOCYTE ESTERASE UR QL STRIP: POSITIVE
PH, POC UA: 5
POC BLOOD, URINE: NEGATIVE
POC NITRATES, URINE: NEGATIVE
PROT UR QL STRIP: NEGATIVE
SP GR UR STRIP: 1.02 (ref 1–1.03)
UROBILINOGEN UR STRIP-ACNC: 1 (ref 0.1–1.1)

## 2019-07-18 PROCEDURE — 99214 OFFICE O/P EST MOD 30 MIN: CPT | Mod: 25,S$GLB,, | Performed by: NURSE PRACTITIONER

## 2019-07-18 PROCEDURE — 81003 POCT URINALYSIS, DIPSTICK, AUTOMATED, W/O SCOPE: ICD-10-PCS | Mod: QW,S$GLB,, | Performed by: NURSE PRACTITIONER

## 2019-07-18 PROCEDURE — 99214 PR OFFICE/OUTPT VISIT, EST, LEVL IV, 30-39 MIN: ICD-10-PCS | Mod: 25,S$GLB,, | Performed by: NURSE PRACTITIONER

## 2019-07-18 PROCEDURE — 72170 PR  X-RAY PELVIS 1/2 VW: ICD-10-PCS | Mod: S$GLB,,, | Performed by: NURSE PRACTITIONER

## 2019-07-18 PROCEDURE — 72170 X-RAY EXAM OF PELVIS: CPT | Mod: S$GLB,,, | Performed by: NURSE PRACTITIONER

## 2019-07-18 PROCEDURE — 81003 URINALYSIS AUTO W/O SCOPE: CPT | Mod: QW,S$GLB,, | Performed by: NURSE PRACTITIONER

## 2019-07-18 RX ORDER — CLOTRIMAZOLE AND BETAMETHASONE DIPROPIONATE 10; .64 MG/G; MG/G
CREAM TOPICAL
Qty: 15 G | Refills: 2 | Status: SHIPPED | OUTPATIENT
Start: 2019-07-18 | End: 2019-11-25 | Stop reason: CLARIF

## 2019-07-18 NOTE — PROGRESS NOTES
Subjective:       Patient ID: Mallory Abadie Bradbury is a 29 y.o. female.    Vitals:  temperature is 99.2 °F (37.3 °C). Her blood pressure is 93/63 and her pulse is 63. Her respiration is 12 and oxygen saturation is 99%.     Chief Complaint: Back Pain    Back Pain   This is a new problem. The current episode started more than 1 month ago. The problem occurs constantly. Pain location: Rt side lower back and side  The quality of the pain is described as stabbing. The pain does not radiate. The pain is at a severity of 5/10. The pain is mild. Pertinent negatives include no abdominal pain, bladder incontinence, bowel incontinence, dysuria or numbness. (Itchy Rash on her right side neck and back ) Treatments tried: hydrocortisone cream  The treatment provided no relief.       Constitution: Negative for fatigue.   HENT: Negative.    Respiratory: Negative.    Gastrointestinal: Negative for abdominal pain and bowel incontinence.   Genitourinary: Negative for dysuria, urgency, bladder incontinence and hematuria.   Musculoskeletal: Positive for pain, joint pain and back pain. Negative for muscle cramps and history of spine disorder.   Skin: Positive for rash.   Neurological: Negative.  Negative for coordination disturbances, numbness and tingling.       Objective:      Physical Exam   Constitutional: She is oriented to person, place, and time. She appears well-developed and well-nourished. She is cooperative.  Non-toxic appearance. She does not appear ill. No distress.   HENT:   Head: Normocephalic and atraumatic.   Right Ear: Hearing, tympanic membrane, external ear and ear canal normal.   Left Ear: Hearing, tympanic membrane, external ear and ear canal normal.   Nose: Nose normal. No mucosal edema, rhinorrhea or nasal deformity. No epistaxis. Right sinus exhibits no maxillary sinus tenderness and no frontal sinus tenderness. Left sinus exhibits no maxillary sinus tenderness and no frontal sinus tenderness.   Mouth/Throat:  Uvula is midline, oropharynx is clear and moist and mucous membranes are normal. No trismus in the jaw. Normal dentition. No uvula swelling. No posterior oropharyngeal erythema.   Eyes: Pupils are equal, round, and reactive to light. Conjunctivae, EOM and lids are normal. Right eye exhibits no discharge. Left eye exhibits no discharge. No scleral icterus.   Sclera clear bilat   Neck: Trachea normal, normal range of motion, full passive range of motion without pain and phonation normal. Neck supple.   Cardiovascular: Normal rate, regular rhythm, normal heart sounds, intact distal pulses and normal pulses.   Pulmonary/Chest: Effort normal and breath sounds normal. No respiratory distress.   Abdominal: Soft. Normal appearance and bowel sounds are normal. She exhibits no distension, no pulsatile midline mass and no mass. There is no tenderness.   Musculoskeletal: Normal range of motion. She exhibits no edema or deformity.        Right hip: She exhibits tenderness and bony tenderness. She exhibits normal range of motion, normal strength, no swelling, no crepitus and no deformity.   Neurological: She is alert and oriented to person, place, and time. She exhibits normal muscle tone. Coordination normal.   Skin: Skin is warm, dry and intact. Rash noted. She is not diaphoretic. No pallor.   Erythematous papular rash to posterior thorax.   Psychiatric: She has a normal mood and affect. Her speech is normal and behavior is normal. Judgment and thought content normal. Cognition and memory are normal.   Nursing note and vitals reviewed.      Assessment:       1. Back pain, unspecified back location, unspecified back pain laterality, unspecified chronicity    2. Hip pain, acute, right    3. Rash of back        Plan:     Vitals stable. Xray negative. Rx: Lotrisone for rash. UA with WBC, no symptoms, will follow culture.    Back pain, unspecified back location, unspecified back pain laterality, unspecified chronicity  -     POCT  Urinalysis, Dipstick, Automated, W/O Scope  -     Culture, Urine    Hip pain, acute, right  -     XR HIP 2 VIEW RIGHT; Future; Expected date: 07/18/2019  -     X-Ray Pelvis Routine AP; Future; Expected date: 07/18/2019    Rash of back  -     clotrimazole-betamethasone 1-0.05% (LOTRISONE) cream; Apply to affected area 2 times daily  Dispense: 15 g; Refill: 2

## 2019-07-18 NOTE — PATIENT INSTRUCTIONS
"  Contact Dermatitis  Contact dermatitis is a skin rash caused by something that touches the skin and makes it irritated and inflamed. Your skin may be red, swollen, dry, and may be cracked. Blisters may form and ooze. The rash will itch.  Contact dermatitis can form on the face and neck, backs of hands, forearms, genitals, and lower legs.  People can get contact dermatitis from lots of sources. These include:  · Plants such as poison ivy, oak, or sumac  · Chemicals in hair dyes and rinses, soaps, solvents, waxes, fingernail polish, and deodorants   · Jewelry or watchbands made of nickel  Contact dermatitis is not passed from person to person.  Talk with your healthcare provider about what may have caused the rash. A type of allergy testing called "patch testing" may be used to discover what you are allergic to. You will need to avoid the source of your rash in the future to prevent it from coming back.  Treatment is done to relieve itching and prevent the rash from coming back. The rash should go away in a few days to a few weeks.  Home care  Your healthcare provider may prescribe medicine to relieve swelling and itching. Follow all instructions when using these medicines.  General care:  · Avoid anything that heats up your skin, such as hot showers or baths, or direct sunlight. This can make itching worse.  · Apply cold compresses to soothe your sores to help relieve your symptoms. Do this for 30 minutes 3 to 4 times a day. You can make a cold compress by soaking a cloth in cold water. Squeeze out excess water. You can add colloidal oatmeal to the water to help reduce itching. For severe itching in a small area, apply an ice pack wrapped in a thin towel. Do this for 20 minutes 3 to 4 times a day.  · You can also try wet dressings. One way to do this is to wear a wet piece of clothing under a dry one. Wear a damp shirt under a dry shirt if your upper body is affected. This can relieve itching and prevent you from " scratching the affected area.  · You can also help relieve large areas of itching by taking a lukewarm bath with colloidal oatmeal added to the water.  · Use hydrocortisone cream for redness and irritation, unless another medicine was prescribed. You can also use benzocaine anesthetic cream or spray. Calamine lotion can also relieve mild symptoms.  · Use oral diphenhydramine to help reduce itching. You can buy this antihistamine at drug and grocery stores. It can make you sleepy, so use lower doses during the daytime. Or you can use loratadine. This is an antihistamine that will not make you sleepy. Do not use diphenhydramine if you have glaucoma or have trouble urinating due to an enlarged prostate.  · If a plant causes your rash, make sure to wash your skin and the clothes you were wearing when you came into contact with the plant. This is to wash away the plant oils that gave you the rash and prevent more or worse symptoms.  · Stay away from the substance or object that causes your symptoms. If you cant avoid it, wear gloves or some other type of protection.  Follow-up care  Follow up with your healthcare provider, or as advised.  When to seek medical advice  Call your healthcare provider right away if any of these occur:  · Spreading of the rash to other parts of your body  · Severe swelling of your face, eyelids, mouth, throat or tongue  · Trouble urinating due to swelling in the genital area  · Fever of 100.4°F (38°C) or higher  · Redness or swelling that gets worse  · Pain that gets worse  · Foul-smelling fluid leaking from the skin  · Yellow-brown crusts on the open blisters  Date Last Reviewed: 9/1/2016  © 6795-1530 [employer]. [address] [place] [address], [place] [address]. All rights reserved. This information is not intended as a substitute for professional medical care. Always follow your healthcare professional's instructions.        Groin Strain (Adult)     A groin strain is a stretching or  partial tearing of the muscle in the lower abdomen or upper thigh. This may happen because of too much coughing, heavy lifting, or active sports. The pain may last for several days to weeks, depending on how bad the stretch or tear is. It will generally get better with rest, ice, and anti-inflammatory medicines.  A groin strain can lead to a groin hernia. This is also called an inguinal hernia. A hernia is a complete tear of the abdominal muscle. This allows fat or the intestines to bulge out and create a visible bump just above the thigh crease. This is a more serious problem and may need surgery to repair it. When you lie down, the bump should get smaller or disappear completely. If it doesnt, and you are not able to flatten it with your hand, you need medical attention right away.  Home care  · Avoid heavy lifting, straining, or any activities that cause groin pain.  · You may use over-the-counter pain medicine to control pain, unless another pain medicine was prescribed. If you have chronic liver or kidney disease or ever had a stomach ulcer or GI bleeding, talk with your healthcare provider before using these medicines.  Follow-up care  Follow up with your healthcare provider, or as advised. Make an appointment with your healthcare provider if you develop a bump in the area of the groin strain.  When to seek medical advice  Call your healthcare provider right away if any of these occur:  · Increasing pain in the area of the groin strain  · Tender bump just above the groin crease that does not flatten when you lie down or press on it  · Overall abdominal swelling or pain  · Fever of 100.4°F (38°C) or above lasting for 24 to 48 hours  · Repeated vomiting  · Pain that moves to the lower right abdomen, just below the waistline, or spreads to the back  Date Last Reviewed: 11/19/2015  © 6984-6469 [employer]. [address] [place] [address], [place] [address]. All rights reserved. This information is not  intended as a substitute for professional medical care. Always follow your healthcare professional's instructions.

## 2019-07-21 LAB
BACTERIA UR CULT: NORMAL
BACTERIA UR CULT: NORMAL

## 2019-10-04 ENCOUNTER — LAB VISIT (OUTPATIENT)
Dept: LAB | Facility: HOSPITAL | Age: 29
End: 2019-10-04
Attending: INTERNAL MEDICINE
Payer: MEDICAID

## 2019-10-04 DIAGNOSIS — I51.9 MYXEDEMA HEART DISEASE: Primary | ICD-10-CM

## 2019-10-04 DIAGNOSIS — E03.9 MYXEDEMA HEART DISEASE: Primary | ICD-10-CM

## 2019-10-04 LAB — TSH SERPL DL<=0.005 MIU/L-ACNC: 2.13 UIU/ML (ref 0.4–4)

## 2019-10-04 PROCEDURE — 36415 COLL VENOUS BLD VENIPUNCTURE: CPT

## 2019-10-04 PROCEDURE — 84443 ASSAY THYROID STIM HORMONE: CPT

## 2019-12-09 ENCOUNTER — TELEPHONE (OUTPATIENT)
Dept: GASTROENTEROLOGY | Facility: CLINIC | Age: 29
End: 2019-12-09

## 2019-12-09 NOTE — TELEPHONE ENCOUNTER
----- Message from Hailee Cavanaugh sent at 12/5/2019  4:49 PM CST -----  Contact: wendi  Needs rx for prilosec with refills sent to her pharmacy.

## 2019-12-10 NOTE — TELEPHONE ENCOUNTER
----- Message from Hailee Cavanaugh sent at 12/10/2019 12:09 PM CST -----  Contact: wendi  Would like to set up egd w/dil as soon as possible.  Does Dr. Garrett want to see her in the office first?   infant. Will revisit as needed.

## 2019-12-19 DIAGNOSIS — R13.10 DYSPHAGIA, UNSPECIFIED TYPE: Primary | ICD-10-CM

## 2019-12-19 DIAGNOSIS — R13.10 DYSPHAGIA: ICD-10-CM

## 2020-01-27 ENCOUNTER — ANESTHESIA (OUTPATIENT)
Dept: SURGERY | Facility: HOSPITAL | Age: 30
End: 2020-01-27
Payer: MEDICAID

## 2020-01-27 ENCOUNTER — ANESTHESIA EVENT (OUTPATIENT)
Dept: SURGERY | Facility: HOSPITAL | Age: 30
End: 2020-01-27
Payer: MEDICAID

## 2020-01-27 ENCOUNTER — HOSPITAL ENCOUNTER (OUTPATIENT)
Facility: HOSPITAL | Age: 30
Discharge: HOME | End: 2020-01-27
Attending: INTERNAL MEDICINE | Admitting: INTERNAL MEDICINE
Payer: MEDICAID

## 2020-01-27 VITALS
HEART RATE: 60 BPM | TEMPERATURE: 99 F | OXYGEN SATURATION: 99 % | WEIGHT: 200 LBS | HEIGHT: 55 IN | BODY MASS INDEX: 46.29 KG/M2 | SYSTOLIC BLOOD PRESSURE: 105 MMHG | DIASTOLIC BLOOD PRESSURE: 56 MMHG | RESPIRATION RATE: 16 BRPM

## 2020-01-27 DIAGNOSIS — K21.9 GASTROESOPHAGEAL REFLUX DISEASE, ESOPHAGITIS PRESENCE NOT SPECIFIED: Primary | ICD-10-CM

## 2020-01-27 DIAGNOSIS — R13.10 DYSPHAGIA: ICD-10-CM

## 2020-01-27 PROCEDURE — 43249 ESOPH EGD DILATION <30 MM: CPT | Mod: ,,, | Performed by: INTERNAL MEDICINE

## 2020-01-27 PROCEDURE — 43249 PR EGD, FLEX, W/BALL DILATION, < 30MM: ICD-10-PCS | Mod: ,,, | Performed by: INTERNAL MEDICINE

## 2020-01-27 PROCEDURE — 43239 EGD BIOPSY SINGLE/MULTIPLE: CPT | Performed by: INTERNAL MEDICINE

## 2020-01-27 PROCEDURE — 43249 ESOPH EGD DILATION <30 MM: CPT | Performed by: INTERNAL MEDICINE

## 2020-01-27 PROCEDURE — 00731 ANES UPR GI NDSC PX NOS: CPT | Performed by: INTERNAL MEDICINE

## 2020-01-27 PROCEDURE — 37000008 HC ANESTHESIA 1ST 15 MINUTES: Performed by: INTERNAL MEDICINE

## 2020-01-27 PROCEDURE — 43239 PR EGD, FLEX, W/BIOPSY, SGL/MULTI: ICD-10-PCS | Mod: 59,,, | Performed by: INTERNAL MEDICINE

## 2020-01-27 PROCEDURE — 27000014 HC INFLATION DEVICE: Performed by: INTERNAL MEDICINE

## 2020-01-27 PROCEDURE — 27000671 HC TUBING MICROBORE EXT: Performed by: STUDENT IN AN ORGANIZED HEALTH CARE EDUCATION/TRAINING PROGRAM

## 2020-01-27 PROCEDURE — 43239 EGD BIOPSY SINGLE/MULTIPLE: CPT | Mod: 59,,, | Performed by: INTERNAL MEDICINE

## 2020-01-27 PROCEDURE — 27200043 HC FORCEPS, BIOPSY: Performed by: INTERNAL MEDICINE

## 2020-01-27 PROCEDURE — 27000673 HC TUBING BLOOD Y: Performed by: STUDENT IN AN ORGANIZED HEALTH CARE EDUCATION/TRAINING PROGRAM

## 2020-01-27 PROCEDURE — 63600175 PHARM REV CODE 636 W HCPCS: Performed by: NURSE ANESTHETIST, CERTIFIED REGISTERED

## 2020-01-27 PROCEDURE — C1726 CATH, BAL DIL, NON-VASCULAR: HCPCS | Performed by: INTERNAL MEDICINE

## 2020-01-27 PROCEDURE — 37000009 HC ANESTHESIA EA ADD 15 MINS: Performed by: INTERNAL MEDICINE

## 2020-01-27 RX ORDER — PROPOFOL 10 MG/ML
INJECTION, EMULSION INTRAVENOUS
Status: DISCONTINUED | OUTPATIENT
Start: 2020-01-27 | End: 2020-01-27

## 2020-01-27 RX ORDER — SODIUM CHLORIDE, SODIUM LACTATE, POTASSIUM CHLORIDE, CALCIUM CHLORIDE 600; 310; 30; 20 MG/100ML; MG/100ML; MG/100ML; MG/100ML
INJECTION, SOLUTION INTRAVENOUS CONTINUOUS PRN
Status: DISCONTINUED | OUTPATIENT
Start: 2020-01-27 | End: 2020-01-27

## 2020-01-27 RX ORDER — SODIUM CHLORIDE 9 MG/ML
INJECTION, SOLUTION INTRAVENOUS CONTINUOUS
Status: DISCONTINUED | OUTPATIENT
Start: 2020-01-27 | End: 2020-01-27 | Stop reason: HOSPADM

## 2020-01-27 RX ORDER — PHENYLEPHRINE HYDROCHLORIDE 10 MG/ML
INJECTION INTRAVENOUS
Status: DISCONTINUED | OUTPATIENT
Start: 2020-01-27 | End: 2020-01-27

## 2020-01-27 RX ORDER — DIPHENHYDRAMINE HYDROCHLORIDE 50 MG/ML
50 INJECTION INTRAMUSCULAR; INTRAVENOUS ONCE AS NEEDED
Status: DISCONTINUED | OUTPATIENT
Start: 2020-01-27 | End: 2020-01-27 | Stop reason: HOSPADM

## 2020-01-27 RX ORDER — SUCRALFATE 1 G/1
1 TABLET ORAL 2 TIMES DAILY
Qty: 180 TABLET | Refills: 1 | Status: SHIPPED | OUTPATIENT
Start: 2020-01-27 | End: 2020-07-09

## 2020-01-27 RX ORDER — SODIUM CHLORIDE 0.9 % (FLUSH) 0.9 %
2 SYRINGE (ML) INJECTION
Status: DISCONTINUED | OUTPATIENT
Start: 2020-01-27 | End: 2020-01-27 | Stop reason: HOSPADM

## 2020-01-27 RX ORDER — MIDAZOLAM HYDROCHLORIDE 1 MG/ML
INJECTION, SOLUTION INTRAMUSCULAR; INTRAVENOUS
Status: DISCONTINUED | OUTPATIENT
Start: 2020-01-27 | End: 2020-01-27

## 2020-01-27 RX ADMIN — PROPOFOL 30 MG: 10 INJECTION, EMULSION INTRAVENOUS at 10:01

## 2020-01-27 RX ADMIN — PROPOFOL 20 MG: 10 INJECTION, EMULSION INTRAVENOUS at 10:01

## 2020-01-27 RX ADMIN — MIDAZOLAM 2 MG: 1 INJECTION INTRAMUSCULAR; INTRAVENOUS at 09:01

## 2020-01-27 RX ADMIN — SODIUM CHLORIDE, SODIUM LACTATE, POTASSIUM CHLORIDE, AND CALCIUM CHLORIDE: .6; .31; .03; .02 INJECTION, SOLUTION INTRAVENOUS at 09:01

## 2020-01-27 RX ADMIN — PHENYLEPHRINE HYDROCHLORIDE 100 MCG: 10 INJECTION INTRAVENOUS at 10:01

## 2020-01-27 RX ADMIN — SODIUM CHLORIDE, SODIUM LACTATE, POTASSIUM CHLORIDE, AND CALCIUM CHLORIDE: .6; .31; .03; .02 INJECTION, SOLUTION INTRAVENOUS at 10:01

## 2020-01-27 NOTE — DISCHARGE INSTRUCTIONS
Esophageal Dilation     A balloon dilator may be used to widen a stricture in the esophagus.   An esophageal dilation is a procedure used to widen a narrowed section of your esophagus. This is the tube that leads from your throat to your stomach. Narrowing (stricture) of the esophagus can cause problems. These include trouble swallowing. This sheet explains what to expect with esophageal dilation.  Why esophageal dilation is needed  Several problems can be treated with esophageal dilation. They include:  · Peptic stricture. This is caused by reflux esophagitis. With this problem, the esophagus is irritated by acid reflux (heartburn). This occurs when acid from your stomach flows back up into the esophagus. Stomach acid damages the lining of the esophagus. This leads to a buildup of scar tissue. As a result, the esophagus is narrowed.  · Schatzkis ring. This is an abnormal ring of tissue. It forms where the esophagus meets the stomach. It can cause trouble swallowing. It can also cause food to get stuck in the esophagus. The cause of this condition is not known.  · Achalasia. This condition stops food and liquids from moving into your stomach from the esophagus. It affects the lower esophageal sphincter (LES). The LES is a muscular ring that opens (relaxes) when you swallow. With achalasia, the LES does not relax. This condition can also cause problems with peristalsis. This is the normal muscular action of the esophagus that moves food into the stomach.  · Eosinophilic esophagitis. This is a redness and swelling (inflammation) in the esophagus. It is caused by an environmental trigger such as a food allergy. It can lead to pain, trouble swallowing, and strictures.  · Less common causes of stricture. Other causes of stricture include radiation treatment and cancer.  Before you have esophageal dilation  · Tell your provider about any medicines you take. This includes prescription medicines, over-the-counter  medicines, herbs, vitamins, and other supplements. Be sure to mention aspirin or any blood thinners youre taking.  · Let your provider know if you need to take antibiotics before dental procedures. You may need to take them before esophageal dilation as well.  · Tell your provider about any health conditions you have, such as heart or lung disease. Also mention any allergies to medicines.  · Youll need to have an empty stomach for the procedure. Follow your providers instructions for not eating and drinking before the procedure.  · Arrange to have a family member or friend drive you home after the procedure.  During the procedure  · You may be given local anesthesia to numb your throat. Youll also likely be given medicine to relax you. The procedure takes about 15 minutes. It does not cause trouble breathing.  · A tube called an endoscope (scope) is used. This is a narrow tube with a tiny light and camera at the end. The scope is inserted through your mouth and into your esophagus. It lets your provider see inside your esophagus. To help guide your provider, an imaging method called fluoroscopy may also be used. This creates a moving X-ray image on a computer screen.   · Next, special tiny tools are carefully guided through your mouth and down into the esophagus. They widen the stricture and are then removed. Different types of instruments are used. The type used depends on the size and cause of the stricture. Types include:  ¨ Balloon dilator. A tiny empty balloon is put into the stricture using an endoscope. The balloon is slowly filled with air. The air is removed from the balloon when the stricture is widened to the right size. Balloon dilators are used to treat many types of strictures.  ¨ Guided wire dilator. A thin wire is eased down the esophagus. A small tube thats wider on one end is guided down the wire. It is put into the stricture to stretch it. These dilators are used to treat all kinds of  strictures.  ¨ Bougies. These are weighted, cone-shaped tubes. Starting with smaller cones, your provider uses increasingly larger cones until the stricture is stretched the right amount. Bougies are often used to treat strictures that are simple (short, straight, and not very narrow).  After the procedure  · Youll be watched closely until your provider says youre ready to go home. Youll need to have a friend or family member drive you home.  · You may have a sore throat for the rest of the day.  · You may have pain behind your breastbone for a short time afterwards.  · You can start drinking fluids again after the numbness in your throat goes away. You can resume eating the same day or the next day.  Risks and possible complications  Risks and possible complications for esophageal dilation include:  · Infection  · A tear or hole in the esophagus lining, causing bleeding and possibly needing surgery to fix  · Risks of anesthesia  Follow-up  You may need to have the procedure repeated one or more times. This depends on the cause and extent of the narrowing. Repeat procedures can allow the dilation to take place more slowly. This reduces the risks of the procedure.  If your stricture was caused by reflux esophagitis, youll likely need to take medicine to treat that condition. Your provider will tell you more.  When to call your provider  Call your healthcare provider right away if you have any of the following after the procedure:  · Fever of 100.4°F (38.0°C)  · Chest pain  · Trouble swallowing  · Vomiting blood or material that looks like coffee grounds  · Bleeding  · Black, tarry, or bloody stools   Date Last Reviewed: 7/1/2016 © 2000-2017 [employer]. [address] [place] [address], [place] [address]. All rights reserved. This information is not intended as a substitute for professional medical care. Always follow your healthcare professional's instructions.        Recovery After Procedural Sedation  (Adult)  You have been given medicine by vein to make you sleep during your surgery. This may have included both a pain medicine and sleeping medicine. Most of the effects have worn off. But you may still have some drowsiness for the next 6 to 8 hours.  Home care  Follow these guidelines when you get home:  · For the next 8 hours, you should be watched by a responsible adult. This person should make sure your condition is not getting worse.  · Don't drink any alcohol for the next 24 hours.  · Don't drive, operate dangerous machinery, or make important business or personal decisions during the next 24 hours.  Note: Your healthcare provider may tell you not to take any medicine by mouth for pain or sleep in the next 4 hours. These medicines may react with the medicines you were given in the hospital. This could cause a much stronger response than usual.  Follow-up care  Follow up with your healthcare provider if you are not alert and back to your usual level of activity within 12 hours.  When to seek medical advice  Call your healthcare provider right away if any of these occur:  · Drowsiness gets worse  · Weakness or dizziness gets worse  · Repeated vomiting  · You can't be awakened   Date Last Reviewed: 10/18/2016  © 2395-5034 [employer]. [address] [place] [address], [place] [address]. All rights reserved. This information is not intended as a substitute for professional medical care. Always follow your healthcare professional's instructions.

## 2020-01-27 NOTE — TRANSFER OF CARE
"Anesthesia Transfer of Care Note    Patient: Mallory Abadie Bradbury    Procedure(s) Performed: Procedure(s) (LRB):  EGD (ESOPHAGOGASTRODUODENOSCOPY) (N/A)    Patient location: GI    Anesthesia Type: MAC    Transport from OR: Transported from OR on room air with adequate spontaneous ventilation    Post pain: adequate analgesia    Post assessment: no apparent anesthetic complications    Post vital signs: stable    Level of consciousness: awake    Nausea/Vomiting: no nausea/vomiting    Complications: none    Transfer of care protocol was followed      Last vitals:   Visit Vitals  /68   Pulse 60   Temp 36.5 °C (97.7 °F)   Resp 18   Ht 4' 5" (1.346 m)   Wt 90.7 kg (200 lb)   SpO2 100%   BMI 50.06 kg/m²     "

## 2020-01-27 NOTE — H&P
[facility]  History & Physical - Short Stay    Subjective:      Chief Complaint/Reason for Admission: EGD    Active Hospital Problems    Diagnosis  POA    Dysphagia [R13.10]  Yes      Resolved Hospital Problems   No resolved problems to display.       History of Present Illness:    Mallory Abadie Bradbury is a 29 y.o. female with Down Syndrome here for evaluation of dysphagia. Has had a good response to the previous dilation. She may need this periodically as the symptoms are coming back. She gets food stuck periodically and she had to go to the emergency room. She has been recently treated for UTI. Consider repeat dilation in future    Current Facility-Administered Medications:     0.9%  NaCl infusion, , Intravenous, Continuous, Violette Garrett MD    diphenhydrAMINE injection 50 mg, 50 mg, Intravenous, Once PRN, Violette Garrett MD    sodium chloride 0.9% flush 2 mL, 2 mL, Intravenous, PRN, Violette Garrett MD  PTA Medications   Medication Sig    cetirizine (ZYRTEC) 1 mg/mL syrup Take by mouth once daily.    escitalopram oxalate (LEXAPRO) 20 MG tablet Take 1 tablet by mouth once daily.    estradiol cypionate (DEPO-ESTRADIOL) 5 mg/mL injection Inject into the muscle every 28 days.    hydrocortisone 2.5 % cream EMEKA SML AMT EXT AA BID    ibuprofen (ADVIL,MOTRIN) 400 MG tablet Take 400 mg by mouth every 4 (four) hours.    levothyroxine (SYNTHROID) 75 MCG tablet Take 75 mcg by mouth once daily.     levothyroxine (SYNTHROID) 88 MCG tablet Take 88 mcg by mouth once daily.    medroxyPROGESTERone (DEPO-PROVERA) 150 mg/mL injection     omeprazole (PRILOSEC) 40 MG capsule Take 40 mg by mouth once daily.       Review of patient's allergies indicates:   Allergen Reactions    Amoxicillin Hives    Augmentin [amoxicillin-pot clavulanate] Rash    Bactrim [sulfamethoxazole-trimethoprim] Rash    Duricef [cefadroxil] Rash    Keflex [cephalexin] Rash    Rifampin Rash        Past Medical  History:   Diagnosis Date    Down's syndrome     Thyroid disease            OBJECTIVE:     Vital Signs (Most Recent):  Temp: 97.7 °F (36.5 °C) (01/27/20 0820)  Pulse: 60 (01/27/20 0820)  Resp: 18 (01/27/20 0820)  BP: 101/68 (01/27/20 0821)  SpO2: 100 % (01/27/20 0820)       Physical Exam:    General Appearance: Well appearing in no acute distress, well developed, well                nourished  Head: Normocephalic, without obvious abnormality  Eyes:  Pupils equal, round and reactive to light  Throat: Lips, mucosa, and tongue normal; teeth and gums normal  Lungs: CTA bilaterally in anterior and posterior fields, no wheezes, no crackles  Heart:  Regular rate and rhythm, no murmurs heard  Abdomen: Soft, non tender, non distended with positive bowel sounds in all four quadrants. No hepatosplenomegaly, ascites, or mass. Negative for succusion splash  : female   Extremities: No cyanosis, edema or deformity  Skin: No rash  Neurologic: Normal exam    ASSESSMENT/PLAN:     1. Esophageal stricture  2. Down's syndrome    Plan: EGD

## 2020-01-27 NOTE — ANESTHESIA POSTPROCEDURE EVALUATION
Anesthesia Post Evaluation    Patient: Mallory Abadie Bradbury    Procedure(s) Performed: Procedure(s) (LRB):  EGD (ESOPHAGOGASTRODUODENOSCOPY) (N/A)    Final Anesthesia Type: MAC    Patient location: holding area.  Patient participation: Yes- Able to Participate  Level of consciousness: awake and alert  Post-procedure vital signs: reviewed and stable  Pain management: adequate  Airway patency: patent    PONV status at discharge: No PONV  Anesthetic complications: no      Cardiovascular status: blood pressure returned to baseline and hemodynamically stable  Respiratory status: unassisted, spontaneous ventilation and room air  Hydration status: euvolemic  Follow-up not needed.          Vitals Value Taken Time   /68 1/27/2020  8:21 AM   Temp 36.5 °C (97.7 °F) 1/27/2020  8:20 AM   Pulse 60 1/27/2020  8:20 AM   Resp 18 1/27/2020  8:20 AM   SpO2 100 % 1/27/2020  8:20 AM         No case tracking events are documented in the log.      Pain/Tere Score: No data recorded

## 2020-01-27 NOTE — ANESTHESIA PREPROCEDURE EVALUATION
01/27/2020  Mallory Abadie Bradbury is a 29 y.o., female.  Patient Active Problem List   Diagnosis    Bilateral foot pain    Corn or callus - Right Foot       Past Surgical History:   Procedure Laterality Date    APPENDECTOMY      esposgus      TONGUE SURGERY      TONSILLECTOMY          Tobacco Use:  The patient  reports that she has never smoked. She does not have any smokeless tobacco history on file.     Results for orders placed or performed during the hospital encounter of 11/25/19   EKG 12-lead    Collection Time: 11/25/19  8:02 AM    Narrative    Test Reason : R42,    Vent. Rate : 060 BPM     Atrial Rate : 060 BPM     P-R Int : 126 ms          QRS Dur : 084 ms      QT Int : 412 ms       P-R-T Axes : 032 056 029 degrees     QTc Int : 412 ms    Normal sinus rhythm with sinus arrhythmia  Normal ECG  When compared with ECG of 24-OCT-2013 09:42,  No significant change was found  Confirmed by ERIK RIDER MD ([id number]) on 11/25/2019 4:51:38 PM    Referred By: [email or web address]   SELF           Confirmed By:ERIK RIDER MD             Lab Results   Component Value Date    WBC 5.90 11/25/2019    HGB 15.2 11/25/2019    HCT 45.7 11/25/2019    MCV 99 (H) 11/25/2019     11/25/2019     BMP  Lab Results   Component Value Date     11/25/2019    K 4.0 11/25/2019     11/25/2019    CO2 27 11/25/2019    BUN 12 11/25/2019    CREATININE 1.2 11/25/2019    CALCIUM 8.8 11/25/2019    ANIONGAP 8 11/25/2019    ESTGFRAFRICA >60.0 11/25/2019    EGFRNONAA >60.0 11/25/2019         Pre-op Assessment    I have reviewed the Patient Summary Reports.     I have reviewed the Nursing Notes.   I have reviewed the Medications.     Review of Systems  Anesthesia Hx:  Hx of Anesthetic complications Hallucinations with Ketamine History of prior surgery of interest to airway management or planning: Denies Family Hx of Anesthesia  complications.  Personal Hx of Anesthesia complications   Social:  Non-Smoker, No Alcohol Use    Hematology/Oncology:  Hematology Normal   Oncology Normal     EENT/Dental:EENT/Dental Normal   Cardiovascular:   Hx ASD closed over time   folowwed by Dr. Ardon   Hx hypotension with syncope Holter Monitor without Arrhythmia noted    Pulmonary:  Pulmonary Normal    Hepatic/GI:   No Active N/V   No Intestinal Obstruction  Hx Esophogeal Stricture    Musculoskeletal:  Congenital/Developmental Disorder: Down's Syndrome    Neurological:  Neurology Normal    Endocrine:   Hypothyroidism    Dermatological:  Skin Normal    Psych:  Psychiatric Normal           Physical Exam  General:  Obesity    Airway/Jaw/Neck:  Airway Findings: Mouth Opening: Normal Tongue: Large  General Airway Assessment: Possible difficult mask airway, Possible difficult intubation, Adult  Mallampati: III  Improves to III with phonation.  TM Distance: < 4 cm  Jaw/Neck Findings:  Neck ROM: Extension Decreased, Mild  Neck Findings:  Girth Increased      Dental:  Dental Findings: Upper front caps   Chest/Lungs:  Chest/Lungs Findings: Clear to auscultation, Normal Respiratory Rate     Heart/Vascular:  Heart Findings: Rate: Normal  Rhythm: Regular Rhythm  Sounds: Normal  Heart murmur: negative Vascular Findings: Normal    Abdomen:  Abdomen Findings: Normal    Musculoskeletal:  Musculoskeletal Findings: Normal   Skin:  Skin Findings: Normal    Mental Status:  Mental Status Findings:  Cooperative, Alert and Oriented         Anesthesia Plan  Type of Anesthesia, risks & benefits discussed:  Anesthesia Type:  MAC  Patient's Preference: MAC  Intra-op Monitoring Plan:   Intra-op Monitoring Plan Comments:   Post Op Pain Control Plan: multimodal analgesia, IV/PO Opioids PRN and per primary service following discharge from PACU  Post Op Pain Control Plan Comments:   Induction:    Beta Blocker:         Informed Consent: Patient representative understands risks and  agrees with Anesthesia plan.  Questions answered. Anesthesia consent signed with patient representative.  ASA Score: 3     Day of Surgery Review of History & Physical:        Anesthesia Plan Notes: MAC with Propofol only

## 2020-01-28 NOTE — [employer] PATIENT INSTRUCTIONS
Discharge Summary/Instructions after an Endoscopic Procedure  Patient Name: Danae Ruff  Patient MRN: [id number]  Patient YOB: 1990 Monday, January 27, 2020  Violette Garrett MD  RESTRICTIONS:  During your procedure today, you received medications for sedation.  These   medications may affect your judgment, balance and coordination.  Therefore,   for 24 hours, you have the following restrictions:   - DO NOT drive a car, operate machinery, make legal/financial decisions,   sign important papers or drink alcohol.    ACTIVITY:  Today: no heavy lifting, straining or running due to procedural   sedation/anesthesia.  The following day: return to full activity including work.  DIET:  Eat and drink normally unless instructed otherwise.     TREATMENT FOR COMMON SIDE EFFECTS:  - Mild abdominal pain, nausea, belching, bloating or excessive gas:  rest,   eat lightly and use a heating pad.  - Sore Throat: treat with throat lozenges and/or gargle with warm salt   water.  - Because air was used during the procedure, expelling large amounts of air   from your rectum or belching is normal.  - If a bowel prep was taken, you may not have a bowel movement for 1-3 days.    This is normal.  SYMPTOMS TO WATCH FOR AND REPORT TO YOUR PHYSICIAN:  1. Abdominal pain or bloating, other than gas cramps.  2. Chest pain.  3. Back pain.  4. Signs of infection such as: chills or fever occurring within 24 hours   after the procedure.  5. Rectal bleeding, which would show as bright red, maroon, or black stools.   (A tablespoon of blood from the rectum is not serious, especially if   hemorrhoids are present.)  6. Vomiting.  7. Weakness or dizziness.  GO DIRECTLY TO THE NEAREST EMERGENCY ROOM IF YOU HAVE ANY OF THE FOLLOWING:      Difficulty breathing              Chills and/or fever over 101 F   Persistent vomiting and/or vomiting blood   Severe abdominal pain   Severe chest pain   Black, tarry stools   Bleeding- more than one  tablespoon   Any other symptom or condition that you feel may need urgent attention  Your doctor recommends these additional instructions:  If any biopsies were taken, your doctors clinic will contact you in 1 to 2   weeks with any results.  - Use Prilosec (omeprazole) 40 mg PO daily indefinitely.   - Discharge patient to home (with escort).  For questions, problems or results please call your physician - Violette Garrett MD at Work:  [phone].  [facility], EMERGENCY ROOM PHONE NUMBER: [phone]  IF A COMPLICATION OR EMERGENCY SITUATION ARISES AND YOU ARE UNABLE TO REACH   YOUR PHYSICIAN - GO DIRECTLY TO THE EMERGENCY ROOM.  Violette Garrett MD  1/27/2020 7:19:41 PM  This report has been verified and signed electronically.  [employer]

## 2020-07-09 ENCOUNTER — OFFICE VISIT (OUTPATIENT)
Dept: GASTROENTEROLOGY | Facility: CLINIC | Age: 30
End: 2020-07-09
Payer: MEDICAID

## 2020-07-09 ENCOUNTER — TELEPHONE (OUTPATIENT)
Dept: GASTROENTEROLOGY | Facility: CLINIC | Age: 30
End: 2020-07-09

## 2020-07-09 ENCOUNTER — TELEPHONE (OUTPATIENT)
Dept: ENDOSCOPY | Facility: HOSPITAL | Age: 30
End: 2020-07-09

## 2020-07-09 ENCOUNTER — LAB VISIT (OUTPATIENT)
Dept: LAB | Facility: HOSPITAL | Age: 30
End: 2020-07-09
Attending: INTERNAL MEDICINE
Payer: MEDICAID

## 2020-07-09 VITALS
HEIGHT: 56 IN | BODY MASS INDEX: 44.99 KG/M2 | HEART RATE: 73 BPM | SYSTOLIC BLOOD PRESSURE: 103 MMHG | WEIGHT: 200 LBS | DIASTOLIC BLOOD PRESSURE: 74 MMHG

## 2020-07-09 DIAGNOSIS — E66.01 MORBID OBESITY WITH BMI OF 40.0-44.9, ADULT: ICD-10-CM

## 2020-07-09 DIAGNOSIS — Z51.81 ENCOUNTER FOR MONITORING LONG-TERM PROTON PUMP INHIBITOR THERAPY: ICD-10-CM

## 2020-07-09 DIAGNOSIS — R10.13 DYSPEPSIA: ICD-10-CM

## 2020-07-09 DIAGNOSIS — Q90.9 DOWN SYNDROME: ICD-10-CM

## 2020-07-09 DIAGNOSIS — K21.9 GASTROESOPHAGEAL REFLUX DISEASE, ESOPHAGITIS PRESENCE NOT SPECIFIED: ICD-10-CM

## 2020-07-09 DIAGNOSIS — Z79.899 ENCOUNTER FOR MONITORING LONG-TERM PROTON PUMP INHIBITOR THERAPY: ICD-10-CM

## 2020-07-09 DIAGNOSIS — Z01.818 PRE-OP TESTING: ICD-10-CM

## 2020-07-09 DIAGNOSIS — R13.19 ESOPHAGEAL DYSPHAGIA: ICD-10-CM

## 2020-07-09 DIAGNOSIS — K21.9 GASTROESOPHAGEAL REFLUX DISEASE, ESOPHAGITIS PRESENCE NOT SPECIFIED: Primary | ICD-10-CM

## 2020-07-09 LAB
25(OH)D3+25(OH)D2 SERPL-MCNC: 8 NG/ML (ref 30–96)
ALBUMIN SERPL BCP-MCNC: 3.3 G/DL (ref 3.5–5.2)
ALP SERPL-CCNC: 103 U/L (ref 55–135)
ALT SERPL W/O P-5'-P-CCNC: 27 U/L (ref 10–44)
ANION GAP SERPL CALC-SCNC: 8 MMOL/L (ref 8–16)
AST SERPL-CCNC: 18 U/L (ref 10–40)
BASOPHILS # BLD AUTO: 0.09 K/UL (ref 0–0.2)
BASOPHILS NFR BLD: 1.1 % (ref 0–1.9)
BILIRUB DIRECT SERPL-MCNC: 0.2 MG/DL (ref 0.1–0.3)
BILIRUB SERPL-MCNC: 0.4 MG/DL (ref 0.1–1)
BUN SERPL-MCNC: 8 MG/DL (ref 6–20)
CALCIUM SERPL-MCNC: 8.8 MG/DL (ref 8.7–10.5)
CHLORIDE SERPL-SCNC: 109 MMOL/L (ref 95–110)
CO2 SERPL-SCNC: 22 MMOL/L (ref 23–29)
CREAT SERPL-MCNC: 1 MG/DL (ref 0.5–1.4)
DIFFERENTIAL METHOD: ABNORMAL
EOSINOPHIL # BLD AUTO: 0.1 K/UL (ref 0–0.5)
EOSINOPHIL NFR BLD: 1.4 % (ref 0–8)
ERYTHROCYTE [DISTWIDTH] IN BLOOD BY AUTOMATED COUNT: 14.6 % (ref 11.5–14.5)
EST. GFR  (AFRICAN AMERICAN): >60 ML/MIN/1.73 M^2
EST. GFR  (NON AFRICAN AMERICAN): >60 ML/MIN/1.73 M^2
FERRITIN SERPL-MCNC: 111 NG/ML (ref 20–300)
FOLATE SERPL-MCNC: 3.6 NG/ML (ref 4–24)
GLUCOSE SERPL-MCNC: 72 MG/DL (ref 70–110)
HBV CORE AB SERPL QL IA: NEGATIVE
HBV SURFACE AG SERPL QL IA: NEGATIVE
HCG INTACT+B SERPL-ACNC: <1.2 MIU/ML
HCT VFR BLD AUTO: 46.4 % (ref 37–48.5)
HCV AB SERPL QL IA: NEGATIVE
HEPATITIS A ANTIBODY, IGG: NEGATIVE
HGB BLD-MCNC: 15.2 G/DL (ref 12–16)
IGA SERPL-MCNC: 160 MG/DL (ref 40–350)
IMM GRANULOCYTES # BLD AUTO: 0.05 K/UL (ref 0–0.04)
IMM GRANULOCYTES NFR BLD AUTO: 0.6 % (ref 0–0.5)
IRON SERPL-MCNC: 65 UG/DL (ref 30–160)
LIPASE SERPL-CCNC: 19 U/L (ref 4–60)
LYMPHOCYTES # BLD AUTO: 2.3 K/UL (ref 1–4.8)
LYMPHOCYTES NFR BLD: 29.3 % (ref 18–48)
MAGNESIUM SERPL-MCNC: 2.3 MG/DL (ref 1.6–2.6)
MCH RBC QN AUTO: 32.2 PG (ref 27–31)
MCHC RBC AUTO-ENTMCNC: 32.8 G/DL (ref 32–36)
MCV RBC AUTO: 98 FL (ref 82–98)
MONOCYTES # BLD AUTO: 0.5 K/UL (ref 0.3–1)
MONOCYTES NFR BLD: 6.3 % (ref 4–15)
NEUTROPHILS # BLD AUTO: 4.9 K/UL (ref 1.8–7.7)
NEUTROPHILS NFR BLD: 61.3 % (ref 38–73)
NRBC BLD-RTO: 0 /100 WBC
PLATELET # BLD AUTO: 268 K/UL (ref 150–350)
PMV BLD AUTO: 10.1 FL (ref 9.2–12.9)
POTASSIUM SERPL-SCNC: 3.8 MMOL/L (ref 3.5–5.1)
PROT SERPL-MCNC: 6.7 G/DL (ref 6–8.4)
RBC # BLD AUTO: 4.72 M/UL (ref 4–5.4)
SATURATED IRON: 19 % (ref 20–50)
SODIUM SERPL-SCNC: 139 MMOL/L (ref 136–145)
TOTAL IRON BINDING CAPACITY: 337 UG/DL (ref 250–450)
TRANSFERRIN SERPL-MCNC: 228 MG/DL (ref 200–375)
TSH SERPL DL<=0.005 MIU/L-ACNC: 0.68 UIU/ML (ref 0.4–4)
VIT B12 SERPL-MCNC: 193 PG/ML (ref 210–950)
WBC # BLD AUTO: 7.98 K/UL (ref 3.9–12.7)

## 2020-07-09 PROCEDURE — 83540 ASSAY OF IRON: CPT

## 2020-07-09 PROCEDURE — 82746 ASSAY OF FOLIC ACID SERUM: CPT

## 2020-07-09 PROCEDURE — 82306 VITAMIN D 25 HYDROXY: CPT

## 2020-07-09 PROCEDURE — 99204 OFFICE O/P NEW MOD 45 MIN: CPT | Mod: S$PBB,,, | Performed by: INTERNAL MEDICINE

## 2020-07-09 PROCEDURE — 99999 PR PBB SHADOW E&M-EST. PATIENT-LVL III: ICD-10-PCS | Mod: PBBFAC,,, | Performed by: INTERNAL MEDICINE

## 2020-07-09 PROCEDURE — 80076 HEPATIC FUNCTION PANEL: CPT

## 2020-07-09 PROCEDURE — 86790 VIRUS ANTIBODY NOS: CPT

## 2020-07-09 PROCEDURE — 83690 ASSAY OF LIPASE: CPT

## 2020-07-09 PROCEDURE — 84702 CHORIONIC GONADOTROPIN TEST: CPT

## 2020-07-09 PROCEDURE — 82728 ASSAY OF FERRITIN: CPT

## 2020-07-09 PROCEDURE — 84443 ASSAY THYROID STIM HORMONE: CPT

## 2020-07-09 PROCEDURE — 99213 OFFICE O/P EST LOW 20 MIN: CPT | Mod: PBBFAC | Performed by: INTERNAL MEDICINE

## 2020-07-09 PROCEDURE — 80048 BASIC METABOLIC PNL TOTAL CA: CPT

## 2020-07-09 PROCEDURE — 86803 HEPATITIS C AB TEST: CPT

## 2020-07-09 PROCEDURE — 86706 HEP B SURFACE ANTIBODY: CPT

## 2020-07-09 PROCEDURE — 86704 HEP B CORE ANTIBODY TOTAL: CPT

## 2020-07-09 PROCEDURE — 99204 PR OFFICE/OUTPT VISIT, NEW, LEVL IV, 45-59 MIN: ICD-10-PCS | Mod: S$PBB,,, | Performed by: INTERNAL MEDICINE

## 2020-07-09 PROCEDURE — 87340 HEPATITIS B SURFACE AG IA: CPT

## 2020-07-09 PROCEDURE — 99999 PR PBB SHADOW E&M-EST. PATIENT-LVL III: CPT | Mod: PBBFAC,,, | Performed by: INTERNAL MEDICINE

## 2020-07-09 PROCEDURE — 83735 ASSAY OF MAGNESIUM: CPT

## 2020-07-09 PROCEDURE — 36415 COLL VENOUS BLD VENIPUNCTURE: CPT

## 2020-07-09 PROCEDURE — 83516 IMMUNOASSAY NONANTIBODY: CPT

## 2020-07-09 PROCEDURE — 82607 VITAMIN B-12: CPT

## 2020-07-09 PROCEDURE — 82784 ASSAY IGA/IGD/IGG/IGM EACH: CPT

## 2020-07-09 PROCEDURE — 85025 COMPLETE CBC W/AUTO DIFF WBC: CPT

## 2020-07-09 RX ORDER — ESOMEPRAZOLE MAGNESIUM 40 MG/1
40 CAPSULE, DELAYED RELEASE ORAL
Qty: 90 CAPSULE | Refills: 3 | Status: SHIPPED | OUTPATIENT
Start: 2020-07-09 | End: 2020-07-25 | Stop reason: ALTCHOICE

## 2020-07-09 NOTE — PROGRESS NOTES
[facility] Consultation Note    Reason for Consult:  The primary encounter diagnosis was Gastroesophageal reflux disease, esophagitis presence not specified. Diagnoses of Esophageal dysphagia, Dyspepsia, Down syndrome, Morbid obesity with BMI of 40.0-44.9, adult, and Encounter for monitoring long-term proton pump inhibitor therapy were also pertinent to this visit.    PCP:   Lizzie Ernst   No address on file    Referring MD:  Aaareferral Self  No address on file    Initial History of Present Illness (HPI):  This is a 30 y.o. female here for evaluation of longstanding GERD and longstanding intermittent solid food dysphagia with esophageal stricture.  Patient with Down syndrome is here with her father he states that she has been having trouble swallowing maybe for the last 3 years every 6 months or so she gets esophageal dilation balloon dilation works better than Savary dilator.  He thinks she needs to be dilated again.  I think 1 time in the last year she had to get food removed from her esophagus emergently with GI through the ER.  No weight loss.  She has little bit of abdominal dyspepsia no fever no chills no weight loss she has been overweight since maybe about 3 years of age no change in her weight no change in bowel habits no diarrhea no constipation no blood in her stool.  She lives at home with her parents.  She is very pleasant.  No current concerns from the family or patient of esophageal food impaction currently.  Handling oral secretions in eating and having bowel movements.  Dad's father had colon cancer after the age of 60 dad's been in her surveillance program few non advanced colon polyps.  No other family members with colon cancer no FAP no attenuated FAP no MA P no Jo syndrome nobody with celiac sprue or inflammatory bowel disease.    Abdominal pain - no  Reflux - history of  Dysphagia -history of intermittent solid food dysphagia   Bowel habits - normal  GI  bleeding - none  NSAID usage - none in the past has taking Advil    Interval HPI 07/09/2020:  The patient's last visit with me was on Visit date not found.      ROS:  Constitutional: No fevers, chills, No weight loss  ENT:  Heartburn and dysphagia no odynophagia no hoarseness  CV: No chest pain, no palpitation  Pulm: No cough, No shortness of breath, no wheezing  Ophtho: No vision changes  GI: see HPI  Derm: No rash, no itching  Heme: No lymphadenopathy, No easy bruising  MSK: No significant arthritis  : No dysuria, No hematuria  Endo: No hot or cold intolerance  Neuro: No syncope, No seizure, no strokes  Psych: No uncontrolled anxiety, No uncontrolled depression    Medical History:  has a past medical history of Down's syndrome and Thyroid disease.    Surgical History:  has a past surgical history that includes Tonsillectomy; Appendectomy; Tongue surgery; esposgus; and Esophagogastroduodenoscopy (N/A, 1/27/2020).    Family History: family history includes Colon cancer (age of onset: 60) in her paternal grandfather..     Social History:  reports that she has never smoked. She does not have any smokeless tobacco history on file. She reports that she does not drink alcohol or use drugs.    Review of patient's allergies indicates:   Allergen Reactions    Amoxicillin Hives    Augmentin [amoxicillin-pot clavulanate] Rash    Bactrim [sulfamethoxazole-trimethoprim] Rash    Duricef [cefadroxil] Rash    Keflex [cephalexin] Rash    Rifampin Rash       Medication List with Changes/Refills   New Medications    ESOMEPRAZOLE (NEXIUM) 40 MG CAPSULE    Take 1 capsule (40 mg total) by mouth before breakfast.   Current Medications    CETIRIZINE (ZYRTEC) 1 MG/ML SYRUP    Take by mouth once daily.    ESCITALOPRAM OXALATE (LEXAPRO) 20 MG TABLET    Take 1 tablet by mouth once daily.    ESTRADIOL CYPIONATE (DEPO-ESTRADIOL) 5 MG/ML INJECTION    Inject into the muscle every 28 days.    HYDROCORTISONE 2.5 % CREAM    EMEKA L AMT EXT  "AA BID    IBUPROFEN (ADVIL,MOTRIN) 400 MG TABLET    Take 400 mg by mouth every 4 (four) hours.    LEVOTHYROXINE (SYNTHROID) 75 MCG TABLET    Take 75 mcg by mouth once daily.     LEVOTHYROXINE (SYNTHROID) 88 MCG TABLET    Take 88 mcg by mouth once daily.    MEDROXYPROGESTERONE (DEPO-PROVERA) 150 MG/ML INJECTION       Discontinued Medications    OMEPRAZOLE (PRILOSEC) 40 MG CAPSULE    Take 40 mg by mouth once daily.    SUCRALFATE (CARAFATE) 1 GRAM TABLET    Take 1 tablet (1 g total) by mouth 2 (two) times daily.         Objective Findings:    Vital Signs:  /74   Pulse 73   Ht 4' 8" (1.422 m)   Wt 90.7 kg (200 lb)   BMI 44.84 kg/m²   Body mass index is 44.84 kg/m².    Physical Exam:  General Appearance: Well appearing in no acute distress.  Very pleasant natured  Eyes:    No scleral icterus  ENT:  No lesions or masses   Lungs: CTA bilaterally, no wheezes, no rhonchi, no rales  Heart:  S1, S2 normal, no murmurs heard  Abdomen:  Obese.  Non distended, soft, no guarding, no rebound, no tenderness, no appreciated ascites, no bruits, no hepatosplenomegaly,  No CVA tenderness, no appreciated hernias  Musculoskeletal:  No major joint deformities  Skin: No petechiae or rash on exposed skin areas  Neurologic:  Alert and oriented x4  Psychiatric:  Normal speech mentation and affect    Labs:  Lab Results   Component Value Date    WBC 5.90 11/25/2019    HGB 15.2 11/25/2019    HCT 45.7 11/25/2019     11/25/2019    ALT 17 11/25/2019    AST 17 11/25/2019     11/25/2019    K 4.0 11/25/2019     11/25/2019    CREATININE 1.2 11/25/2019    BUN 12 11/25/2019    CO2 27 11/25/2019    TSH 2.85 11/25/2019             Medical Decision Making:  Labs were  Last EGD reviewed dilated to 15 mm  EGD talk given  Lab work talk given  Esophagram with barium tablet talk given  Long-term PPI talk given  Images a prior EGD reviewed myself      Assessment:  1. Gastroesophageal reflux disease, esophagitis presence not specified  "   2. Esophageal dysphagia    3. Dyspepsia    4. Down syndrome    5. Morbid obesity with BMI of 40.0-44.9, adult    6. Encounter for monitoring long-term proton pump inhibitor therapy         Recommendations:  1.  Longstanding history GERD and esophageal dysphagia requiring dilations for about 3 years now  Last dilation was to 15 mm.  Will recommend esophagram with barium tablet to see where  To focus on the dilation.  Will set up patient for EGD on the 2nd floor for airway protection due to difficult  Intubation per father and patient's and oral anatomy.  2.  Long-term PPI use recommend Nexium 40 mg once daily best taken 60 min before 1st protein meal of the day breakfast.  3.  Obesity recommend gradual weight loss will check lab work.  And PPI labs.  4.  Return GI clinic in 6 months sooner if any concerns or symptoms.    Follow up in about 6 months (around 1/9/2021).      Order summary:  Orders Placed This Encounter    FL Esophagram With Barium Tablet    Vitamin D    Vitamin B12    Magnesium    Basic metabolic panel    Folate    TSH    hCG, quantitative    TISSUE TRANSGLUTAMINASE (TTG), IGA    IgA    Lipase    Hepatic function panel    HEPATITIS A ANTIBODY, IGG    Hepatitis B Surface Antibody, Qual/Quant    Hepatitis B Surface Antigen    Hepatitis C Antibody    Hepatitis B Core Antibody, Total    CBC auto differential    Iron and TIBC    Ferritin    esomeprazole (NEXIUM) 40 MG capsule    Case request GI: EGD (ESOPHAGOGASTRODUODENOSCOPY)         Thank you so much for allowing me to participate in the care of Mallory Abadie Bradbury Sean E Connolly, MD

## 2020-07-09 NOTE — TELEPHONE ENCOUNTER
Done    ----- Message from Domingo Durand MD sent at 7/9/2020  9:07 AM CDT -----  Pilar please schedulePatient for lab work today 2nd floor orders placed.Please schedule her for esophagram with barium tablet ideally this weekGayle please schedule patient for 2nd floor EGD next week for airway protection difficult airway down syndrome small mouth for esophageal dilation.Pilar please schedule her return GI clinic 6 months for follow-up

## 2020-07-09 NOTE — TELEPHONE ENCOUNTER
Spoke to Mallory (pt mom) and informed her that th endoscopy  (Starr) told me that the pt have to get her esophogram done after her EGD, and I will call back om tomorrow to reschedule the esophogram because the radiology department that schedules the test are gone for today. Mallory expressed understanding

## 2020-07-09 NOTE — Clinical Note
Pilar please schedulePatient for lab work today 2nd floor orders placed.    Please schedule her for esophagram with barium tablet ideally this week    Starr please schedule patient for 2nd floor EGD next week for airway protection difficult airway down syndrome small mouth for esophageal dilation.  Pilar please schedule her return GI clinic 6 months for follow-up

## 2020-07-09 NOTE — TELEPHONE ENCOUNTER
----- Message from Domingo Durand MD sent at 7/9/2020  9:07 AM CDT -----  Pilar please schedulePatient for lab work today 2nd floor orders placed.Please schedule her for esophagram with barium tablet ideally this weekGayle please schedule patient for 2nd floor EGD next week for airway protection difficult airway down syndrome small mouth for esophageal dilation.Pilar please schedule her return GI clinic 6 months for follow-up

## 2020-07-10 ENCOUNTER — TELEPHONE (OUTPATIENT)
Dept: GASTROENTEROLOGY | Facility: CLINIC | Age: 30
End: 2020-07-10

## 2020-07-10 NOTE — TELEPHONE ENCOUNTER
Spoke with Mrs. Ruff (pt mother) and rescheduled pt esophogram to 07/16/2020 at 9:30AM. Mrs. Ruff expressed understanding.

## 2020-07-11 ENCOUNTER — LAB VISIT (OUTPATIENT)
Dept: PRIMARY CARE CLINIC | Facility: CLINIC | Age: 30
End: 2020-07-11
Payer: MEDICAID

## 2020-07-11 DIAGNOSIS — Z01.818 PRE-OP TESTING: ICD-10-CM

## 2020-07-11 PROCEDURE — U0003 INFECTIOUS AGENT DETECTION BY NUCLEIC ACID (DNA OR RNA); SEVERE ACUTE RESPIRATORY SYNDROME CORONAVIRUS 2 (SARS-COV-2) (CORONAVIRUS DISEASE [COVID-19]), AMPLIFIED PROBE TECHNIQUE, MAKING USE OF HIGH THROUGHPUT TECHNOLOGIES AS DESCRIBED BY CMS-2020-01-R: HCPCS

## 2020-07-11 NOTE — PROGRESS NOTES
Pt presented for Pre-procedure drive thru testing. Patient identified using two patient identifiers prior to specimen collection. Questions answered prior to departure and education given.

## 2020-07-12 LAB — SARS-COV-2 RNA RESP QL NAA+PROBE: NOT DETECTED

## 2020-07-13 LAB
HBV SURFACE AB SER QL IA: NEGATIVE
HBV SURFACE AB SERPL IA-ACNC: 9 MIU/ML
TTG IGA SER-ACNC: 4 UNITS

## 2020-07-14 ENCOUNTER — ANESTHESIA EVENT (OUTPATIENT)
Dept: ENDOSCOPY | Facility: HOSPITAL | Age: 30
End: 2020-07-14
Payer: MEDICAID

## 2020-07-14 ENCOUNTER — TELEPHONE (OUTPATIENT)
Dept: GASTROENTEROLOGY | Facility: CLINIC | Age: 30
End: 2020-07-14

## 2020-07-14 ENCOUNTER — HOSPITAL ENCOUNTER (OUTPATIENT)
Facility: HOSPITAL | Age: 30
Discharge: HOME | End: 2020-07-14
Attending: INTERNAL MEDICINE | Admitting: INTERNAL MEDICINE
Payer: MEDICAID

## 2020-07-14 ENCOUNTER — ANESTHESIA (OUTPATIENT)
Dept: ENDOSCOPY | Facility: HOSPITAL | Age: 30
End: 2020-07-14
Payer: MEDICAID

## 2020-07-14 VITALS
SYSTOLIC BLOOD PRESSURE: 100 MMHG | BODY MASS INDEX: 44.84 KG/M2 | TEMPERATURE: 98 F | HEART RATE: 78 BPM | DIASTOLIC BLOOD PRESSURE: 64 MMHG | RESPIRATION RATE: 22 BRPM | WEIGHT: 200 LBS | OXYGEN SATURATION: 100 %

## 2020-07-14 DIAGNOSIS — R13.10 DYSPHAGIA: ICD-10-CM

## 2020-07-14 PROCEDURE — 00731 ANES UPR GI NDSC PX NOS: CPT | Performed by: INTERNAL MEDICINE

## 2020-07-14 PROCEDURE — 88342 CHG IMMUNOCYTOCHEMISTRY: ICD-10-PCS | Mod: 26,,, | Performed by: PATHOLOGY

## 2020-07-14 PROCEDURE — 88305 TISSUE EXAM BY PATHOLOGIST: CPT | Mod: 26,,, | Performed by: PATHOLOGY

## 2020-07-14 PROCEDURE — 88305 TISSUE EXAM BY PATHOLOGIST: ICD-10-PCS | Mod: 26,,, | Performed by: PATHOLOGY

## 2020-07-14 PROCEDURE — 88342 IMHCHEM/IMCYTCHM 1ST ANTB: CPT | Performed by: PATHOLOGY

## 2020-07-14 PROCEDURE — 88305 TISSUE EXAM BY PATHOLOGIST: CPT | Mod: 59 | Performed by: PATHOLOGY

## 2020-07-14 PROCEDURE — D9220A PRA ANESTHESIA: Mod: CRNA,,, | Performed by: NURSE ANESTHETIST, CERTIFIED REGISTERED

## 2020-07-14 PROCEDURE — 43239 EGD BIOPSY SINGLE/MULTIPLE: CPT | Mod: 59,,, | Performed by: INTERNAL MEDICINE

## 2020-07-14 PROCEDURE — 37000008 HC ANESTHESIA 1ST 15 MINUTES: Performed by: INTERNAL MEDICINE

## 2020-07-14 PROCEDURE — 25000003 PHARM REV CODE 250: Performed by: INTERNAL MEDICINE

## 2020-07-14 PROCEDURE — 63600175 PHARM REV CODE 636 W HCPCS: Performed by: NURSE ANESTHETIST, CERTIFIED REGISTERED

## 2020-07-14 PROCEDURE — D9220A PRA ANESTHESIA: ICD-10-PCS | Mod: ANES,,, | Performed by: ANESTHESIOLOGY

## 2020-07-14 PROCEDURE — 43249 PR EGD, FLEX, W/BALL DILATION, < 30MM: ICD-10-PCS | Mod: ,,, | Performed by: INTERNAL MEDICINE

## 2020-07-14 PROCEDURE — 43249 ESOPH EGD DILATION <30 MM: CPT | Performed by: INTERNAL MEDICINE

## 2020-07-14 PROCEDURE — 25000003 PHARM REV CODE 250: Performed by: NURSE ANESTHETIST, CERTIFIED REGISTERED

## 2020-07-14 PROCEDURE — D9220A PRA ANESTHESIA: ICD-10-PCS | Mod: CRNA,,, | Performed by: NURSE ANESTHETIST, CERTIFIED REGISTERED

## 2020-07-14 PROCEDURE — 27201012 HC FORCEPS, HOT/COLD, DISP: Performed by: INTERNAL MEDICINE

## 2020-07-14 PROCEDURE — 88342 IMHCHEM/IMCYTCHM 1ST ANTB: CPT | Mod: 26,,, | Performed by: PATHOLOGY

## 2020-07-14 PROCEDURE — C1726 CATH, BAL DIL, NON-VASCULAR: HCPCS | Performed by: INTERNAL MEDICINE

## 2020-07-14 PROCEDURE — 43239 PR EGD, FLEX, W/BIOPSY, SGL/MULTI: ICD-10-PCS | Mod: 59,,, | Performed by: INTERNAL MEDICINE

## 2020-07-14 PROCEDURE — D9220A PRA ANESTHESIA: Mod: ANES,,, | Performed by: ANESTHESIOLOGY

## 2020-07-14 PROCEDURE — 94761 N-INVAS EAR/PLS OXIMETRY MLT: CPT

## 2020-07-14 PROCEDURE — 43239 EGD BIOPSY SINGLE/MULTIPLE: CPT | Performed by: INTERNAL MEDICINE

## 2020-07-14 PROCEDURE — 37000009 HC ANESTHESIA EA ADD 15 MINS: Performed by: INTERNAL MEDICINE

## 2020-07-14 PROCEDURE — 43249 ESOPH EGD DILATION <30 MM: CPT | Mod: ,,, | Performed by: INTERNAL MEDICINE

## 2020-07-14 RX ORDER — PROPOFOL 10 MG/ML
VIAL (ML) INTRAVENOUS CONTINUOUS PRN
Status: DISCONTINUED | OUTPATIENT
Start: 2020-07-14 | End: 2020-07-14

## 2020-07-14 RX ORDER — ONDANSETRON 2 MG/ML
4 INJECTION INTRAMUSCULAR; INTRAVENOUS ONCE AS NEEDED
Status: DISCONTINUED | OUTPATIENT
Start: 2020-07-14 | End: 2020-07-14 | Stop reason: HOSPADM

## 2020-07-14 RX ORDER — GLYCOPYRROLATE 0.2 MG/ML
INJECTION INTRAMUSCULAR; INTRAVENOUS
Status: DISCONTINUED | OUTPATIENT
Start: 2020-07-14 | End: 2020-07-14

## 2020-07-14 RX ORDER — HYDROMORPHONE HYDROCHLORIDE 1 MG/ML
0.2 INJECTION, SOLUTION INTRAMUSCULAR; INTRAVENOUS; SUBCUTANEOUS EVERY 5 MIN PRN
Status: DISCONTINUED | OUTPATIENT
Start: 2020-07-14 | End: 2020-07-14 | Stop reason: HOSPADM

## 2020-07-14 RX ORDER — PROPOFOL 10 MG/ML
VIAL (ML) INTRAVENOUS
Status: DISCONTINUED | OUTPATIENT
Start: 2020-07-14 | End: 2020-07-14

## 2020-07-14 RX ORDER — FENTANYL CITRATE 50 UG/ML
INJECTION, SOLUTION INTRAMUSCULAR; INTRAVENOUS
Status: DISCONTINUED | OUTPATIENT
Start: 2020-07-14 | End: 2020-07-14

## 2020-07-14 RX ORDER — SODIUM CHLORIDE 9 MG/ML
INJECTION, SOLUTION INTRAVENOUS CONTINUOUS
Status: DISCONTINUED | OUTPATIENT
Start: 2020-07-14 | End: 2020-07-14 | Stop reason: HOSPADM

## 2020-07-14 RX ORDER — LIDOCAINE HCL/PF 100 MG/5ML
SYRINGE (ML) INTRAVENOUS
Status: DISCONTINUED | OUTPATIENT
Start: 2020-07-14 | End: 2020-07-14

## 2020-07-14 RX ORDER — FENTANYL CITRATE 50 UG/ML
25 INJECTION, SOLUTION INTRAMUSCULAR; INTRAVENOUS EVERY 5 MIN PRN
Status: DISCONTINUED | OUTPATIENT
Start: 2020-07-14 | End: 2020-07-14 | Stop reason: HOSPADM

## 2020-07-14 RX ADMIN — Medication 100 MG: at 08:07

## 2020-07-14 RX ADMIN — PROPOFOL 200 MCG/KG/MIN: 10 INJECTION, EMULSION INTRAVENOUS at 08:07

## 2020-07-14 RX ADMIN — FENTANYL CITRATE 25 MCG: 50 INJECTION, SOLUTION INTRAMUSCULAR; INTRAVENOUS at 08:07

## 2020-07-14 RX ADMIN — GLYCOPYRROLATE 0.2 MG: 0.2 INJECTION, SOLUTION INTRAMUSCULAR; INTRAVENOUS at 08:07

## 2020-07-14 RX ADMIN — PROPOFOL 130 MG: 10 INJECTION, EMULSION INTRAVENOUS at 08:07

## 2020-07-14 RX ADMIN — SODIUM CHLORIDE: 0.9 INJECTION, SOLUTION INTRAVENOUS at 07:07

## 2020-07-14 NOTE — TELEPHONE ENCOUNTER
Spoke with pt's mother and f/u appointment was scheduled for 08/26/2020 @3PM. Pt's mother verbalized understanding. Appointment letter will be mailed out.

## 2020-07-14 NOTE — DISCHARGE INSTRUCTIONS

## 2020-07-14 NOTE — TRANSFER OF CARE
Anesthesia Transfer of Care Note    Patient: Mallory Abadie Bradbury    Procedure(s) Performed: Procedure(s) (LRB):  EGD (ESOPHAGOGASTRODUODENOSCOPY) (N/A)    Patient location: [facility]    Anesthesia Type: general    Transport from OR: Transported from OR on room air with adequate spontaneous ventilation    Post pain: adequate analgesia    Post assessment: no apparent anesthetic complications and tolerated procedure well    Post vital signs: stable    Level of consciousness: awake    Nausea/Vomiting: no vomiting    Complications: none    Transfer of care protocol was followed      Last vitals:   Visit Vitals  Wt 90.7 kg (200 lb)   Breastfeeding No   BMI 44.84 kg/m²

## 2020-07-14 NOTE — H&P
Short Stay Endoscopy History and Physical    PCP - Lizzie Ernst NP  Referring Physician - Domingo Durand MD  [address] [place] [address]    Procedure - Endoscopy  ASA - per anesthesia  Mallampati - per anesthesia  History of Anesthesia problems - no  Family history Anesthesia problems -  no   Plan of anesthesia - General    HPI  30 y.o. female here for EGD for evaluation of dysphagia. History obtained from patient's father at bedside. Patient has long history of intermittent dysphagia to solid foods requiring dilations, most recent EGD on 1/27/20 showed esophageal stenosis at 35cm to 38cm, dilated with TTS with 12-13-15 up to 15 mm, patient felt mild improvement after dilation but father reports patient had felt better from previous dilations the past. Patient takes Nexium daily. She denies abdominal pain, indigestion, bloating.       ROS:  Constitutional: No fevers, chills, No weight loss  CV: No chest pain  Pulm: No cough, No shortness of breath  GI: see HPI    Medical History:  has a past medical history of Down's syndrome and Thyroid disease.    Surgical History:  has a past surgical history that includes Tonsillectomy; Appendectomy; Tongue surgery; esposgus; and Esophagogastroduodenoscopy (N/A, 1/27/2020).    Family History: family history includes Colon cancer (age of onset: 60) in her paternal grandfather..    Social History:  reports that she has never smoked. She does not have any smokeless tobacco history on file. She reports that she does not drink alcohol or use drugs.    Review of patient's allergies indicates:   Allergen Reactions    Amoxicillin Hives    Augmentin [amoxicillin-pot clavulanate] Rash    Bactrim [sulfamethoxazole-trimethoprim] Rash    Duricef [cefadroxil] Rash    Keflex [cephalexin] Rash    Rifampin Rash       Medications:   Medications Prior to Admission   Medication Sig Dispense Refill Last Dose    cetirizine (ZYRTEC) 1 mg/mL syrup Take by mouth once  daily.       escitalopram oxalate (LEXAPRO) 20 MG tablet Take 1 tablet by mouth once daily.       esomeprazole (NEXIUM) 40 MG capsule Take 1 capsule (40 mg total) by mouth before breakfast. 90 capsule 3     estradiol cypionate (DEPO-ESTRADIOL) 5 mg/mL injection Inject into the muscle every 28 days.       hydrocortisone 2.5 % cream EMEKA SML AMT EXT AA BID  1     levothyroxine (SYNTHROID) 75 MCG tablet Take 75 mcg by mouth once daily.        levothyroxine (SYNTHROID) 88 MCG tablet Take 88 mcg by mouth once daily.       medroxyPROGESTERone (DEPO-PROVERA) 150 mg/mL injection   2        Physical Exam:    Vital Signs: There were no vitals filed for this visit.    General Appearance: Well appearing in no acute distress  Abdomen: Soft, non tender, non distended with normal bowel sounds, no masses    Labs:  Lab Results   Component Value Date    WBC 7.98 07/09/2020    HGB 15.2 07/09/2020    HCT 46.4 07/09/2020     07/09/2020    ALT 27 07/09/2020    AST 18 07/09/2020     07/09/2020    K 3.8 07/09/2020     07/09/2020    CREATININE 1.0 07/09/2020    BUN 8 07/09/2020    CO2 22 (L) 07/09/2020    TSH 0.679 07/09/2020       I have explained the risks and benefits of this endoscopic procedure to the patient including but not limited to bleeding, inflammation, infection, perforation, and death.      Tolu Kiser MD

## 2020-07-14 NOTE — TELEPHONE ENCOUNTER
----- Message from Tolu Kiser MD sent at 7/14/2020  8:26 AM CDT -----  Can we schedule clinic visit with me in 4-6 weeks please. thanks

## 2020-07-14 NOTE — PLAN OF CARE
Patient resting comfortably in bed. Father then mother at bedside, questions answered. Drinking juice well. Vitals stable

## 2020-07-14 NOTE — ANESTHESIA POSTPROCEDURE EVALUATION
Anesthesia Post Evaluation    Patient: Mallory Abadie Bradbury    Procedure(s) Performed: Procedure(s) (LRB):  EGD (ESOPHAGOGASTRODUODENOSCOPY) (N/A)    Final Anesthesia Type: general    Patient location during evaluation: PACU  Patient participation: Yes- Able to Participate  Level of consciousness: awake  Post-procedure vital signs: reviewed and stable  Pain management: adequate  Airway patency: patent    PONV status at discharge: No PONV  Anesthetic complications: no      Cardiovascular status: blood pressure returned to baseline  Respiratory status: unassisted  Hydration status: euvolemic  Follow-up not needed.          Vitals Value Taken Time   /64 07/14/20 0847   Temp 36.5 °C (97.7 °F) 07/14/20 0836   Pulse 75 07/14/20 0855   Resp 23 07/14/20 0855   SpO2 100 % 07/14/20 0855   Vitals shown include unvalidated device data.      Event Time   Out of Recovery 09:04:00         Pain/Tere Score: Tere Score: 10 (7/14/2020  9:00 AM)

## 2020-07-14 NOTE — ANESTHESIA PREPROCEDURE EVALUATION
07/14/2020  Mallory Abadie Bradbury is a 30 y.o., female.    Anesthesia Evaluation          Review of Systems  Anesthesia Hx:  No problems with previous Anesthesia    Social:  Non-Smoker    Cardiovascular:   Exercise tolerance: good Denies Hypertension.  Denies CAD.     Denies Angina.  Functional Capacity Can you climb two flights of stairs? ==> Yes    Pulmonary:   Denies Asthma.  Denies Recent URI.  Denies Sleep Apnea.    Renal/:  Renal/ Normal     Hepatic/GI:   Denies PUD. Denies Hiatal Hernia.  Denies GERD. Denies Liver Disease.  Denies Hepatitis.    Neurological:   Denies CVA. Denies Seizures.       Down's syndrome         Endocrine:   Denies Diabetes. Denies Hypothyroidism.        Physical Exam  General:  Well nourished    Airway/Jaw/Neck:  Airway Findings: Mouth Opening: Small, but > 3cm Tongue: Large  General Airway Assessment: Adult  Mallampati: IV  Improves to IV with phonation.  TM Distance: Normal, at least 6 cm  Jaw/Neck Findings:  Neck ROM: Normal ROM  Neck Findings:  Girth Increased      Dental:  Dental Findings: In tact             Anesthesia Plan  Type of Anesthesia, risks & benefits discussed:  Anesthesia Type:  general  Patient's Preference: Proceed with anesthesia understanding that the risks are very small but could be serious or life threatening.  Intra-op Monitoring Plan: standard ASA monitors  Intra-op Monitoring Plan Comments:   Post Op Pain Control Plan:   Post Op Pain Control Plan Comments:   Induction:   IV  Beta Blocker:  Patient is not currently on a Beta-Blocker (No further documentation required).       Informed Consent: Patient understands risks and agrees with Anesthesia plan.  Questions answered. Anesthesia consent signed with patient.  ASA Score: 2     Day of Surgery Review of History & Physical: I have interviewed and examined the patient. I have reviewed the patient's  H&P dated:            Ready For Surgery From Anesthesia Perspective.

## 2020-07-14 NOTE — [employer] PATIENT INSTRUCTIONS
Discharge Summary/Instructions after an Endoscopic Procedure  Patient Name: Danae Ruff  Patient MRN: [id number]  Patient YOB: 1990 Tuesday, July 14, 2020  Domingo Durand MD  RESTRICTIONS:  During your procedure today, you received medications for sedation.  These   medications may affect your judgment, balance and coordination.  Therefore,   for 24 hours, you have the following restrictions:   - DO NOT drive a car, operate machinery, make legal/financial decisions,   sign important papers or drink alcohol.    ACTIVITY:  Today: no heavy lifting, straining or running due to procedural   sedation/anesthesia.  The following day: return to full activity including work.  DIET:  Eat and drink normally unless instructed otherwise.     TREATMENT FOR COMMON SIDE EFFECTS:  - Mild abdominal pain, nausea, belching, bloating or excessive gas:  rest,   eat lightly and use a heating pad.  - Sore Throat: treat with throat lozenges and/or gargle with warm salt   water.  - Because air was used during the procedure, expelling large amounts of air   from your rectum or belching is normal.  - If a bowel prep was taken, you may not have a bowel movement for 1-3 days.    This is normal.  SYMPTOMS TO WATCH FOR AND REPORT TO YOUR PHYSICIAN:  1. Abdominal pain or bloating, other than gas cramps.  2. Chest pain.  3. Back pain.  4. Signs of infection such as: chills or fever occurring within 24 hours   after the procedure.  5. Rectal bleeding, which would show as bright red, maroon, or black stools.   (A tablespoon of blood from the rectum is not serious, especially if   hemorrhoids are present.)  6. Vomiting.  7. Weakness or dizziness.  GO DIRECTLY TO THE NEAREST EMERGENCY ROOM IF YOU HAVE ANY OF THE FOLLOWING:      Difficulty breathing              Chills and/or fever over 101 F   Persistent vomiting and/or vomiting blood   Severe abdominal pain   Severe chest pain   Black, tarry stools   Bleeding- more than one  tablespoon   Any other symptom or condition that you feel may need urgent attention  Your doctor recommends these additional instructions:  If any biopsies were taken, your doctors clinic will contact you in 1 to 2   weeks with any results.  - Discharge patient to home.   - Follow an antireflux regimen.   - Await pathology results.   - Use a proton pump inhibitor like Nexium 40mg once daily. Best taken 60   minutes before first protein meal of the day - Breakfast.  - Telephone endoscopist for pathology results in 2 weeks.   - Return to GI clinic at the next available appointment. Patient may need   esophageal motility study to exclude underlying esophageal motility   disorder.   - The findings and recommendations were discussed with the patient.   - The findings and recommendations were discussed with the patient's   family.  For questions, problems or results please call your physician - Domingo Durand MD at Work:  [phone].  [facility], EMERGENCY ROOM PHONE NUMBER: [phone]  IF A COMPLICATION OR EMERGENCY SITUATION ARISES AND YOU ARE UNABLE TO REACH   YOUR PHYSICIAN - GO DIRECTLY TO THE EMERGENCY ROOM.  Domingo Durand MD  7/14/2020 8:39:35 AM  This report has been verified and signed electronically.  [employer]

## 2020-07-14 NOTE — H&P
[facility]  History & Physical    Subjective:      Chief Complaint/Reason for Admission:    EGD    Mallory Abadie Bradbury is a 30 y.o. female.    Past Medical History:   Diagnosis Date    Down's syndrome     Thyroid disease      Past Surgical History:   Procedure Laterality Date    APPENDECTOMY      ESOPHAGOGASTRODUODENOSCOPY N/A 1/27/2020    Procedure: EGD (ESOPHAGOGASTRODUODENOSCOPY);  Surgeon: Violette Garrett MD;  Location: [facility];  Service: Endoscopy;  Laterality: N/A;    esposgus      TONGUE SURGERY      TONSILLECTOMY       Family History   Problem Relation Age of Onset    Colon cancer Paternal Grandfather 60    Esophageal cancer Neg Hx     Celiac disease Neg Hx     Cirrhosis Neg Hx     Colon polyps Neg Hx      Social History     Tobacco Use    Smoking status: Never Smoker   Substance Use Topics    Alcohol use: No    Drug use: Never       PTA Medications   Medication Sig    escitalopram oxalate (LEXAPRO) 20 MG tablet Take 1 tablet by mouth once daily.    esomeprazole (NEXIUM) 40 MG capsule Take 1 capsule (40 mg total) by mouth before breakfast.    estradiol cypionate (DEPO-ESTRADIOL) 5 mg/mL injection Inject into the muscle every 28 days.    levothyroxine (SYNTHROID) 75 MCG tablet Take 75 mcg by mouth once daily.     levothyroxine (SYNTHROID) 88 MCG tablet Take 88 mcg by mouth once daily.    medroxyPROGESTERone (DEPO-PROVERA) 150 mg/mL injection     cetirizine (ZYRTEC) 1 mg/mL syrup Take by mouth once daily.    hydrocortisone 2.5 % cream EMEKA SML AMT EXT AA BID     Review of patient's allergies indicates:   Allergen Reactions    Amoxicillin Hives    Augmentin [amoxicillin-pot clavulanate] Rash    Bactrim [sulfamethoxazole-trimethoprim] Rash    Duricef [cefadroxil] Rash    Keflex [cephalexin] Rash    Rifampin Rash        Review of Systems   Constitutional: Negative for chills and fever.   Respiratory: Negative for shortness of breath.    Cardiovascular:  Negative for chest pain.   Gastrointestinal: Negative for abdominal pain.       Objective:      Vital Signs (Most Recent)       Vital Signs Range (Last 24H):       Physical Exam  Constitutional:       Appearance: Normal appearance.   Pulmonary:      Effort: Pulmonary effort is normal.   Neurological:      Mental Status: She is alert.             Assessment:      Active Hospital Problems    Diagnosis  POA    Dysphagia [R13.10]  Yes      Resolved Hospital Problems   No resolved problems to display.       Plan:    EGD for GERD and dysphagia

## 2020-07-16 ENCOUNTER — HOSPITAL ENCOUNTER (OUTPATIENT)
Dept: RADIOLOGY | Facility: HOSPITAL | Age: 30
Discharge: HOME | End: 2020-07-16
Attending: INTERNAL MEDICINE
Payer: MEDICAID

## 2020-07-16 DIAGNOSIS — R10.13 DYSPEPSIA: ICD-10-CM

## 2020-07-16 DIAGNOSIS — K21.9 GASTROESOPHAGEAL REFLUX DISEASE, ESOPHAGITIS PRESENCE NOT SPECIFIED: ICD-10-CM

## 2020-07-16 DIAGNOSIS — R13.19 ESOPHAGEAL DYSPHAGIA: ICD-10-CM

## 2020-07-16 PROCEDURE — 25500020 PHARM REV CODE 255: Performed by: INTERNAL MEDICINE

## 2020-07-16 PROCEDURE — 74220 X-RAY XM ESOPHAGUS 1CNTRST: CPT | Mod: TC

## 2020-07-16 PROCEDURE — 74220 FL ESOPHAGRAM WITH BARIUM TABLET: ICD-10-PCS | Mod: 26,,, | Performed by: RADIOLOGY

## 2020-07-16 PROCEDURE — A9698 NON-RAD CONTRAST MATERIALNOC: HCPCS | Performed by: INTERNAL MEDICINE

## 2020-07-16 PROCEDURE — 74220 X-RAY XM ESOPHAGUS 1CNTRST: CPT | Mod: 26,,, | Performed by: RADIOLOGY

## 2020-07-16 RX ADMIN — BARIUM SULFATE 175 ML: 0.6 SUSPENSION ORAL at 09:07

## 2020-07-19 DIAGNOSIS — E61.1 IRON DEFICIENCY: ICD-10-CM

## 2020-07-19 DIAGNOSIS — E55.9 VITAMIN D DEFICIENCY: ICD-10-CM

## 2020-07-19 DIAGNOSIS — E53.8 VITAMIN B12 DEFICIENCY: Primary | ICD-10-CM

## 2020-07-19 DIAGNOSIS — E66.01 MORBID OBESITY WITH BMI OF 40.0-44.9, ADULT: ICD-10-CM

## 2020-07-19 DIAGNOSIS — E66.01 MORBID OBESITY: Primary | ICD-10-CM

## 2020-07-19 DIAGNOSIS — E53.8 FOLATE DEFICIENCY: ICD-10-CM

## 2020-07-19 DIAGNOSIS — Q90.9 DOWN SYNDROME: ICD-10-CM

## 2020-07-19 RX ORDER — FOLIC ACID 1 MG/1
1 TABLET ORAL DAILY
Qty: 30 TABLET | Refills: 11 | Status: SHIPPED | OUTPATIENT
Start: 2020-07-19 | End: 2021-08-08

## 2020-07-19 RX ORDER — FERROUS SULFATE 325(65) MG
325 TABLET ORAL EVERY 12 HOURS
Qty: 60 TABLET | Refills: 4 | Status: SHIPPED | OUTPATIENT
Start: 2020-07-19 | End: 2021-02-01 | Stop reason: SDUPTHER

## 2020-07-19 RX ORDER — ACETAMINOPHEN 500 MG
1 TABLET ORAL DAILY
Qty: 90 CAPSULE | Refills: 3 | Status: SHIPPED | OUTPATIENT
Start: 2020-07-19 | End: 2021-07-19

## 2020-07-19 RX ORDER — ERGOCALCIFEROL 1.25 MG/1
50000 CAPSULE ORAL
Qty: 12 CAPSULE | Refills: 0 | Status: SHIPPED | OUTPATIENT
Start: 2020-07-19 | End: 2020-10-05

## 2020-07-20 ENCOUNTER — TELEPHONE (OUTPATIENT)
Dept: ENDOSCOPY | Facility: HOSPITAL | Age: 30
End: 2020-07-20

## 2020-07-20 DIAGNOSIS — Z12.11 SPECIAL SCREENING FOR MALIGNANT NEOPLASMS, COLON: Primary | ICD-10-CM

## 2020-07-20 DIAGNOSIS — Z01.818 PRE-OP TESTING: ICD-10-CM

## 2020-07-20 LAB
FINAL PATHOLOGIC DIAGNOSIS: NORMAL
GROSS: NORMAL

## 2020-07-20 RX ORDER — POLYETHYLENE GLYCOL 3350, SODIUM SULFATE ANHYDROUS, SODIUM BICARBONATE, SODIUM CHLORIDE, POTASSIUM CHLORIDE 236; 22.74; 6.74; 5.86; 2.97 G/4L; G/4L; G/4L; G/4L; G/4L
4 POWDER, FOR SOLUTION ORAL ONCE
Qty: 4000 ML | Refills: 0 | Status: SHIPPED | OUTPATIENT
Start: 2020-07-20 | End: 2020-07-20

## 2020-07-23 ENCOUNTER — TELEPHONE (OUTPATIENT)
Dept: GASTROENTEROLOGY | Facility: HOSPITAL | Age: 30
End: 2020-07-23

## 2020-07-23 DIAGNOSIS — K20.0 EOSINOPHILIC ESOPHAGITIS: Primary | ICD-10-CM

## 2020-07-23 RX ORDER — BUDESONIDE 1 MG/2ML
INHALANT ORAL
Qty: 100 ML | Refills: 0 | Status: SHIPPED | OUTPATIENT
Start: 2020-07-23 | End: 2022-04-15

## 2020-07-23 NOTE — TELEPHONE ENCOUNTER
Called patient's mother about endoscopy biopsies. She has biopsy proven EoE. I have explained the background, risk factors, etiology, and treatment. Patient has already been on PPI for 1 year, so will go ahead and start swallowed fluticasone, repeat Endoscopy at the same time she will be getting a colonoscopy that is scheduled. I will also refer her to allergist and nutritionist.

## 2020-07-24 ENCOUNTER — TELEPHONE (OUTPATIENT)
Dept: ALLERGY | Facility: CLINIC | Age: 30
End: 2020-07-24

## 2020-07-24 NOTE — TELEPHONE ENCOUNTER
----- Message from Jamie Aquino MA sent at 7/23/2020  4:13 PM CDT -----  Please contact pt to schedule an appointment. Thanks!    Jamie Aquino MA

## 2020-07-25 DIAGNOSIS — K21.9 GASTROESOPHAGEAL REFLUX DISEASE, ESOPHAGITIS PRESENCE NOT SPECIFIED: ICD-10-CM

## 2020-07-25 DIAGNOSIS — E61.1 IRON DEFICIENCY: Primary | ICD-10-CM

## 2020-07-25 DIAGNOSIS — R13.10 DYSPHAGIA, UNSPECIFIED TYPE: ICD-10-CM

## 2020-07-25 DIAGNOSIS — E66.01 MORBID OBESITY WITH BMI OF 40.0-44.9, ADULT: ICD-10-CM

## 2020-07-25 DIAGNOSIS — Q90.9 DOWN SYNDROME: ICD-10-CM

## 2020-07-25 DIAGNOSIS — K21.9 GASTROESOPHAGEAL REFLUX DISEASE, ESOPHAGITIS PRESENCE NOT SPECIFIED: Primary | ICD-10-CM

## 2020-07-25 RX ORDER — PANTOPRAZOLE SODIUM 40 MG/1
40 TABLET, DELAYED RELEASE ORAL EVERY 12 HOURS
Qty: 60 TABLET | Refills: 3 | Status: ON HOLD | OUTPATIENT
Start: 2020-07-25 | End: 2020-08-28 | Stop reason: HOSPADM

## 2020-07-27 ENCOUNTER — TELEPHONE (OUTPATIENT)
Dept: ENDOSCOPY | Facility: HOSPITAL | Age: 30
End: 2020-07-27

## 2020-07-27 NOTE — TELEPHONE ENCOUNTER
You placed a duplicate order for an EGD and colonoscopy. In the comments you wrote: Please order CBC with platelets, ferritin, iron TIBC, albumin, folate, vitamin B12 day of case.    Patient is already scheduled for those procedures on 8/28 at 8:00 am. Due to her arrival time of 7:00, she will not be able to have labs that morning.  Thank you.  Starr

## 2020-08-04 ENCOUNTER — CLINICAL SUPPORT (OUTPATIENT)
Dept: GASTROENTEROLOGY | Facility: CLINIC | Age: 30
End: 2020-08-04
Payer: MEDICAID

## 2020-08-04 DIAGNOSIS — R13.19 ESOPHAGEAL DYSPHAGIA: ICD-10-CM

## 2020-08-04 PROCEDURE — 98968 PR NONPHYSICIAN TELEPHONE ASSESSMENT 21-30 MIN: ICD-10-PCS | Mod: 95,,, | Performed by: DIETITIAN, REGISTERED

## 2020-08-04 PROCEDURE — 98968 PH1 ASSMT&MGMT NQHP 21-30: CPT | Mod: 95,,, | Performed by: DIETITIAN, REGISTERED

## 2020-08-12 ENCOUNTER — OFFICE VISIT (OUTPATIENT)
Dept: ALLERGY | Facility: CLINIC | Age: 30
End: 2020-08-12
Payer: MEDICAID

## 2020-08-12 VITALS — WEIGHT: 203.69 LBS | HEIGHT: 56 IN | BODY MASS INDEX: 45.82 KG/M2

## 2020-08-12 DIAGNOSIS — K20.0 EOSINOPHILIC ESOPHAGITIS DUE TO FOOD: Primary | ICD-10-CM

## 2020-08-12 PROCEDURE — 99204 OFFICE O/P NEW MOD 45 MIN: CPT | Mod: S$PBB,,, | Performed by: STUDENT IN AN ORGANIZED HEALTH CARE EDUCATION/TRAINING PROGRAM

## 2020-08-12 PROCEDURE — 99213 OFFICE O/P EST LOW 20 MIN: CPT | Mod: PBBFAC | Performed by: STUDENT IN AN ORGANIZED HEALTH CARE EDUCATION/TRAINING PROGRAM

## 2020-08-12 PROCEDURE — 99999 PR PBB SHADOW E&M-EST. PATIENT-LVL III: ICD-10-PCS | Mod: PBBFAC,,, | Performed by: STUDENT IN AN ORGANIZED HEALTH CARE EDUCATION/TRAINING PROGRAM

## 2020-08-12 PROCEDURE — 99204 PR OFFICE/OUTPT VISIT, NEW, LEVL IV, 45-59 MIN: ICD-10-PCS | Mod: S$PBB,,, | Performed by: STUDENT IN AN ORGANIZED HEALTH CARE EDUCATION/TRAINING PROGRAM

## 2020-08-12 PROCEDURE — 99999 PR PBB SHADOW E&M-EST. PATIENT-LVL III: CPT | Mod: PBBFAC,,, | Performed by: STUDENT IN AN ORGANIZED HEALTH CARE EDUCATION/TRAINING PROGRAM

## 2020-08-12 RX ORDER — POLYETHYLENE GLYCOL-3350 AND ELECTROLYTES 236; 6.74; 5.86; 2.97; 22.74 G/274.31G; G/274.31G; G/274.31G; G/274.31G; G/274.31G
POWDER, FOR SOLUTION ORAL
Status: ON HOLD | COMMUNITY
Start: 2020-07-20 | End: 2021-02-09

## 2020-08-12 RX ORDER — LANOLIN ALCOHOL/MO/W.PET/CERES
CREAM (GRAM) TOPICAL
COMMUNITY
End: 2022-06-20

## 2020-08-12 NOTE — PROGRESS NOTES
ALLERGY & IMMUNOLOGY CLINIC - INITIAL CONSULTATION     HISTORY OF PRESENT ILLNESS     Patient ID: Mallory Abadie Bradbury is a 30 y.o. female    CC: Newly diagnosed EoE    HPI: Danae is a 30 year old female with Trisomy 21 presenting as initial visit for EoE. At that time, was found to have a food bolus she needed endoscopic removal. Mother reports feeding difficulties since she was a baby mostly consisting of GERD. Noticed in the previous few years she was taking longer to eat and requiring more liquids to wash her food down. Since diagnosis, she has required approximately 6 dilations but continues to have difficulty with eating with every meal. Diet is very limited but she does consume regular amounts of cow's milk protein (In Smoothies) as well as wheat. Intermittently consumes eggs and seafood. Otherwise does not regularly consume tree nuts or peanuts. Does have wheat consistently in her diet. Was started on swallowed budesonide about 2 weeks ago which has not yet started to work. Mother currently limiting her diet to soft and bite sized foods. Has been on Nexium for the previous year with no symptom relief and was recently changed to Nexium (Protonix not covered by her insurance). Mother has not noticed any improvement with PPIs or swallowed Fluticasone in the previous 2 weeks. Set to have repeat EGD and colonoscopy in several weeks with GI.     REVIEW OF SYSTEMS     CONST: no F/C/NS, no unintentional weight changes  NEURO: no H/A, no weakness, no paresthesias  EYES: no discharge, no pruritus, no erythema  EARS: no hearing loss, no sensation of fullness  NOSE: no congestion, no rhinorrhea, no discharge, no itching, no sneezing  PULM: no SOB, no wheezing, no cough, no snoring  CV: no CP, no palpitations, no leg swelling  GI: Vomiting and dysphagia   URO: no dysuria, no hematuria, no nocturia  MSK: no joint pain, no muscle pain  DERM: no rashes, no skin breaks     MEDICAL HISTORY     MedHx: active problems  "reviewed    SurgHx:   Past Surgical History:   Procedure Laterality Date    APPENDECTOMY      ESOPHAGOGASTRODUODENOSCOPY N/A 1/27/2020    Procedure: EGD (ESOPHAGOGASTRODUODENOSCOPY);  Surgeon: Violette Garrett MD;  [facility];  Service: Endoscopy;  Laterality: N/A;    ESOPHAGOGASTRODUODENOSCOPY N/A 7/14/2020    Procedure: EGD (ESOPHAGOGASTRODUODENOSCOPY);  Surgeon: Domingo Duarnd MD;  Location: [facility] ([facility] FLR);  Service: Endoscopy;  Laterality: N/A;  Urgent EGD 2nd floor for airway protection due to difficult intubation airway anatomy for esophageal dilation.  Please schedule her next week 2nd floor for dysphagia.     COVID test at [facility] on 7/11/20-GT    esposgus      TONGUE SURGERY      TONSILLECTOMY       SocHx: Lives at home with father and mother    FamHx:   Family History   Problem Relation Age of Onset    Colon cancer Paternal Grandfather 60    Esophageal cancer Neg Hx     Celiac disease Neg Hx     Cirrhosis Neg Hx     Colon polyps Neg Hx        Allergies: see below    Medications: MAR reviewed    Vaccines: UTD    H/o Asthma: denies  H/o Eczema: denies  H/o Rhinitis: denies  Oral Allergy:  denies  Food Allergy: denies  Venom Allergy: denies  Latex Allergy: denies  Other Allergies: denies  Env/Occ: denies any evironmental or occupational exposures     PHYSICAL EXAM     VS: Ht 4' 8" (1.422 m)   Wt 92.4 kg (203 lb 11.3 oz)   BMI 45.67 kg/m²   GENERAL: Alert and oriented, Cooperative with exam  EYES: PERRL, EOMI, no conjunctival injection, no discharge, no infraorbital shiners  NOSE: NT 2+ and B/L, no stringing mucous, no polyps  ORAL: MMM, no ulcers, no thrush, no cobblestoning  LUNGS: CTAB, no w/r/c, no increased WOB  HEART: RRR, normal S1/S2, no m/g/r  ABDOMEN: BS present, soft, non-tender, non-distended, no HSM  EXTREMITIES: +2 distal pulses, no c/c/e  LYMPHATICS: no cervical/submandibular LAD, no axillary LAD, no inguinal LAD  DERM: no rashes, no skin breaks, no dystrophic " fingernails  NEURO: normal gait, no facial asymmetry       IMAGING & OTHER DIAGNOSTICS     FL Esophagus Impression:   1. Esophageal dysmotility, as above.  2. An area of mild focal narrowing is identified at the distal esophagus in this patient with history of EGD dilation of Schatzki ring.  Clinical correlation is advised.  3. Gastroesophageal reflux disease, as above.  4. Small size hiatal hernia.  This report was flagged in Epic as abnormal.     CHART REVIEW     Previous GI and Provider notes, lab tests     ASSESSMENT & PLAN     Mallory Abadie Bradbury is a 30 y.o. female with     Eosinophilic esophagitis due to food-Given lack of improvement with PPIs as well as Swallowed steroids necessitating repetitive dilations, and the presence of ongoing symptoms, it is likely warranted to start with a food elimination diet. Given her very strict diet, it may be difficult to adhere, but will trial 2 food elimination diet in addition to continuing PPI and swallowed steroids per GI recommendations. Unsure if seafood and eggs are playing a role in symptoms given the lack of consumption on a regular basis. Will trial elimination and recheck after scope in several weeks to determine if symptoms are improved. Possible that ongoing GERD is also a large contribution as her recent endoscopy and biopsy showed limited Eosinophils in the proximal compared to distal esophagus. Food allergen testing is not indicated at this visit as findings may not correlate with EoE triggers  -Start with 2 Food elimination diet of Wheat and cow's milk protein containing products  -Continue steroids/PPIs per GI recommendations   -May attempt at next visit if symptoms fail to improve to try avoidance of eggs and seafood, though this may be difficult given limited diet    Follow up: 4-6 Weeks      Joel Espinosa MD  Allergy/Immunology Fellow

## 2020-08-17 ENCOUNTER — OFFICE VISIT (OUTPATIENT)
Dept: URGENT CARE | Facility: CLINIC | Age: 30
End: 2020-08-17
Payer: MEDICAID

## 2020-08-17 ENCOUNTER — TELEPHONE (OUTPATIENT)
Dept: GASTROENTEROLOGY | Facility: CLINIC | Age: 30
End: 2020-08-17

## 2020-08-17 VITALS — TEMPERATURE: 98 F | OXYGEN SATURATION: 99 % | BODY MASS INDEX: 46.98 KG/M2 | HEIGHT: 55 IN | WEIGHT: 203 LBS

## 2020-08-17 DIAGNOSIS — M54.9 BACK PAIN, UNSPECIFIED BACK LOCATION, UNSPECIFIED BACK PAIN LATERALITY, UNSPECIFIED CHRONICITY: Primary | ICD-10-CM

## 2020-08-17 LAB
BILIRUB UR QL STRIP: NEGATIVE
GLUCOSE UR QL STRIP: NEGATIVE
KETONES UR QL STRIP: NEGATIVE
LEUKOCYTE ESTERASE UR QL STRIP: NEGATIVE
PH, POC UA: 5
POC BLOOD, URINE: NEGATIVE
POC NITRATES, URINE: NEGATIVE
PROT UR QL STRIP: NEGATIVE
SP GR UR STRIP: 1.02 (ref 1–1.03)
UROBILINOGEN UR STRIP-ACNC: NORMAL (ref 0.1–1.1)

## 2020-08-17 PROCEDURE — 81003 URINALYSIS AUTO W/O SCOPE: CPT | Mod: QW,S$GLB,, | Performed by: NURSE PRACTITIONER

## 2020-08-17 PROCEDURE — 72074 X-RAY EXAM THORAC SPINE4/>VW: CPT | Mod: S$GLB,,, | Performed by: EMERGENCY MEDICINE

## 2020-08-17 PROCEDURE — 81025 PR  URINE PREGNANCY TEST: ICD-10-PCS | Mod: S$GLB,,, | Performed by: EMERGENCY MEDICINE

## 2020-08-17 PROCEDURE — 99214 PR OFFICE/OUTPT VISIT, EST, LEVL IV, 30-39 MIN: ICD-10-PCS | Mod: 25,S$GLB,, | Performed by: NURSE PRACTITIONER

## 2020-08-17 PROCEDURE — 99214 OFFICE O/P EST MOD 30 MIN: CPT | Mod: 25,S$GLB,, | Performed by: NURSE PRACTITIONER

## 2020-08-17 PROCEDURE — 72074 PR  X-RAY THORACIC SPINE 4 VW: ICD-10-PCS | Mod: S$GLB,,, | Performed by: EMERGENCY MEDICINE

## 2020-08-17 PROCEDURE — 81025 URINE PREGNANCY TEST: CPT | Mod: S$GLB,,, | Performed by: EMERGENCY MEDICINE

## 2020-08-17 PROCEDURE — 81003 POCT URINALYSIS, DIPSTICK, AUTOMATED, W/O SCOPE: ICD-10-PCS | Mod: QW,S$GLB,, | Performed by: NURSE PRACTITIONER

## 2020-08-17 RX ORDER — METHOCARBAMOL 500 MG/1
500 TABLET, FILM COATED ORAL 4 TIMES DAILY
Qty: 40 TABLET | Refills: 0 | Status: SHIPPED | OUTPATIENT
Start: 2020-08-17 | End: 2020-08-28

## 2020-08-17 NOTE — TELEPHONE ENCOUNTER
----- Message from Sheridan Linder sent at 8/17/2020  9:10 AM CDT -----  Regarding: pt advice  Contact: pt [phone]  Type:  Needs Medical Advice    Who Called: Mallory(pt mother)  Symptoms (please be specific): throwing up blood and bad back pain  How long has patient had these symptoms:  5 days   Pharmacy name and phone #:    Would the patient rather a call back or a response via [facility]? Call back  Best Call Back Number:   Additional Information: one episode of throwing up blood and one on today

## 2020-08-17 NOTE — TELEPHONE ENCOUNTER
Called and spoke to pt's mother Mallory.  Mallory says pt vomited blood 2 times on 08/16 and once on 08/17.  Pt is complaining of severe back pain.   Mallory says she brought pt to urgent care to be evaluated to rule out UTI and says results show no UTI.   Notified Mallory pt is scheduled for EGD and colon on 08/28 at which that time the symptoms can be evaluated along with reflux.  Told Mallory, if pain becomes severe, it is always recommended pt go to nearest ED for evaluation.  Mallory says when pt went to urgent care they did not discuss in depth there vomiting blood but discuss more on the back pain to rule out UTI.  Mallory says she is concerned bc the vomiting blood and back pains are becoming more frequent.  Notified Mallory message will be sent to provider and will contact her back.  Mallory appreciated the call.

## 2020-08-17 NOTE — PATIENT INSTRUCTIONS
Back Care Tips    Caring for your back  These are things you can do to prevent a recurrence of acute back pain and to reduce symptoms from chronic back pain:  · Maintain a healthy weight. If you are overweight, losing weight will help most types of back pain.  · Exercise is an important part of recovery from most types of back pain. The muscles behind and in front of the spine support the back. This means strengthening both the back muscles and the abdominal muscles will provide better support for your spine.   · Swimming and brisk walking are good overall exercises to improve your fitness level.  · Practice safe lifting methods (below).  · Practice good posture when sitting, standing and walking. Avoid prolonged sitting. This puts more stress on the lower back than standing or walking.  · Wear quality shoes with sufficient arch support. Foot and ankle alignment can affect back symptoms. Women should avoid wearing high heels.  · Therapeutic massage can help relax the back muscles without stretching them.  · During the first 24 to 72 hours after an acute injury or flare-up of chronic back pain, apply an ice pack to the painful area for 20 minutes and then remove it for 20 minutes, over a period of 60 to 90 minutes, or several times a day. As a safety precaution, do not use a heating pad at bedtime. Sleeping on a heating pad can lead to skin burns or tissue damage.  · You can alternate ice and heat therapies.  Medications  Talk to your healthcare provider before using medicines, especially if you have other medical problems or are taking other medicines.  · You may use acetaminophen or ibuprofen to control pain, unless your healthcare provider prescribed other pain medicine. If you have chronic conditions like diabetes, liver or kidney disease, stomach ulcers, or gastrointestinal bleeding, or are taking blood thinners, talk with your healthcare provider before taking any medicines.  · Be careful if you are given  prescription pain medicines, narcotics, or medicine for muscle spasm. They can cause drowsiness, affect your coordination, reflexes, and judgment. Do not drive or operate heavy machinery while taking these types of medicines. Take prescription pain medicine only as prescribed by your healthcare provider.  Lumbar stretch  Here is a simple stretching exercise that will help relax muscle spasm and keep your back more limber. If exercise makes your back pain worse, dont do it.  · Lie on your back with your knees bent and both feet on the ground.  · Slowly raise your left knee to your chest as you flatten your lower back against the floor. Hold for 5 seconds.  · Relax and repeat the exercise with your right knee.  · Do 10 of these exercises for each leg.  Safe lifting method  · Dont bend over at the waist to lift an object off the floor.  Instead, bend your knees and hips in a squat.   · Keep your back and head upright  · Hold the object close to your body, directly in front of you.  · Straighten your legs to lift the object.   · Lower the object to the floor in the reverse fashion.  · If you must slide something across the floor, push it.  Posture tips  Sitting  Sit in chairs with straight backs or low-back support. Keep your knees lower than your hips, with your feet flat on the floor.  When driving, sit up straight. Adjust the seat forward so you are not leaning toward the steering wheel.  A small pillow or rolled towel behind your lower back may help if you are driving long distances.   Standing  When standing for long periods, shift most of your weight to one leg at a time. Alternate legs every few minutes.   Sleeping  The best way to sleep is on your side with your knees bent. Put a low pillow under your head to support your neck in a neutral spine position. Avoid thick pillows that bend your neck to one side. Put a pillow between your legs to further relax your lower back. If you sleep on your back, put pillows  under your knees to support your legs in a slightly flexed position. Use a firm mattress. If your mattress sags, replace it, or use a 1/2-inch plywood board under the mattress to add support.  Follow-up care  Follow up with your healthcare provider, or as advised.  If X-rays, a CT scan or an MRI scan were taken, they will be reviewed by a radiologist. You will be notified of any new findings that may affect your care.  Call 911  Seek emergency medical care if any of the following occur:  · Trouble breathing  · Confusion  · Very drowsy  · Fainting or loss of consciousness  · Rapid or very slow heart rate  · Loss of  bowel or bladder control  When to seek medical care  Call your healthcare provider if any of the following occur:  · Pain becomes worse or spreads to your arms or legs  · Weakness or numbness in one or both arms or legs  · Numbness in the groin area  Date Last Reviewed: 6/1/2016  © 5035-8790 [employer]. [address] [place] [address]. All rights reserved. This information is not intended as a substitute for professional medical care. Always follow your healthcare professional's instructions.        Relieving Back Pain  Back pain is a common problem. You can strain back muscles by lifting too much weight or just by moving the wrong way. Back strain can be uncomfortable, even painful. And it can take weeks or months to improve. To help yourself feel better and prevent future back strains, try these tips.  Important Note: Do not give aspirin to children or teens without first discussing it with your healthcare provider.      ? Ice    Ice reduces muscle pain and swelling. It helps most during the first 24 to 48 hours after an injury.  · Wrap an ice pack or a bag of frozen peas in a thin towel. (Never place ice directly on your skin.)  · Place the ice where your back hurts the most.  · Dont ice for more than 20 minutes at a time.  · You can use ice several times a  day.  ? Medicines  Over-the-counter pain relievers can include acetaminophen and anti-inflammatory medicines, which includes aspirin or ibuprofen. They can help ease discomfort. Some also reduce swelling.  · Tell your healthcare provider about any medicines you are already taking.  · Take medicines only as directed.  ? Heat  After the first 48 hours, heat can relax sore muscles and improve blood flow.  · Try a warm bath or shower. Or use a heating pad set on low. To prevent a burn, keep a cloth between you and the heating pad.  · Dont use a heating pad for more than 15 minutes at a time. Never sleep on a heating pad.  Date Last Reviewed: 9/1/2015  © 2901-0290 [employer]. [address] [place] [address], [place] [address]. All rights reserved. This information is not intended as a substitute for professional medical care. Always follow your healthcare professional's instructions.

## 2020-08-17 NOTE — PROGRESS NOTES
"Subjective:       Patient ID: Mallory Abadie Bradbury is a 30 y.o. female.    Vitals:  height is 4' 5" (1.346 m) and weight is 92.1 kg (203 lb). Her oral temperature is 97.8 °F (36.6 °C). Her oxygen saturation is 99%.     Chief Complaint: Back Pain    Patient presents today with mom at the bedside. Mom reports that Danae has been complaining of back pain for the last week. No known injury. She has noticed her "tossing and turning" at night while sleeping and she reports that her back hurts. Denies LUTS. Denies f/c/n/v/d. No sciatica, no numbness or tingling.     Back Pain  This is a new problem. The current episode started 1 to 4 weeks ago. The problem occurs constantly. The problem has been gradually worsening since onset. The quality of the pain is described as aching. The pain does not radiate. The pain is the same all the time. The symptoms are aggravated by bending, lying down, position, twisting, sitting and standing. Pertinent negatives include no abdominal pain, bladder incontinence, bowel incontinence, dysuria, fever, headaches, leg pain, numbness, paresis, paresthesias, pelvic pain, tingling or weakness. Treatments tried: Aleve, not on a schedule. The treatment provided no relief.       Constitution: Negative for fatigue and fever.   Gastrointestinal: Negative for abdominal pain and bowel incontinence.   Genitourinary: Negative for dysuria, urgency, bladder incontinence, hematuria and pelvic pain.   Musculoskeletal: Positive for back pain. Negative for muscle cramps and history of spine disorder.   Skin: Negative for rash.   Neurological: Negative for coordination disturbances, headaches, numbness and tingling.       Objective:      Physical Exam   Constitutional: She is oriented to person, place, and time. She appears well-developed. She is cooperative. No distress.   HENT:   Head: Normocephalic and atraumatic.   Nose: Nose normal.   Mouth/Throat: Oropharynx is clear and moist and mucous membranes are " normal.   Eyes: Conjunctivae and lids are normal.   Neck: Trachea normal, normal range of motion, full passive range of motion without pain and phonation normal. Neck supple.   Cardiovascular: Normal rate, regular rhythm and normal heart sounds.   Pulmonary/Chest: Effort normal and breath sounds normal.   Abdominal: Soft. Normal appearance and bowel sounds are normal. She exhibits no abdominal bruit, no pulsatile midline mass and no mass.   Musculoskeletal:         General: No deformity.      Thoracic back: She exhibits tenderness, pain and spasm. She exhibits no swelling and no edema.        Back:    Neurological: She is alert and oriented to person, place, and time. She has normal strength and normal reflexes. No sensory deficit.   Skin: Skin is warm, dry, intact and not diaphoretic. Psychiatric: Her speech is normal and behavior is normal. Judgment and thought content normal.   Nursing note and vitals reviewed.        Assessment:       1. Back pain, unspecified back location, unspecified back pain laterality, unspecified chronicity        Plan:         Back pain, unspecified back location, unspecified back pain laterality, unspecified chronicity  -     Pregnancy, urine rapid  -     POCT Urinalysis, Dipstick, Automated, W/O Scope  -     methocarbamoL (ROBAXIN) 500 MG Tab; Take 1 tablet (500 mg total) by mouth 4 (four) times daily. for 10 days  Dispense: 40 tablet; Refill: 0  -     Culture, Urine  -     XR THORACIC SPINE AP LATERAL; Future; Expected date: 08/17/2020    ED precautions discussed

## 2020-08-18 NOTE — TELEPHONE ENCOUNTER
Called and spoke to Mallory, pt's mother.   Informed Mallory of message per Dr. Durand.   Mallory says she is pulling up to [facility] being her  is a nurse there. She asked if she should leave and go to [facility].   Informed Mallory, she can go to her nearest ED.   Mallory verbalized understanding and appreciated the call.

## 2020-08-19 LAB
BACTERIA UR CULT: NO GROWTH
BACTERIA UR CULT: NORMAL

## 2020-08-20 ENCOUNTER — TELEPHONE (OUTPATIENT)
Dept: URGENT CARE | Facility: CLINIC | Age: 30
End: 2020-08-20

## 2020-08-20 NOTE — TELEPHONE ENCOUNTER
----- Message from JACQUES Iglesias sent at 8/20/2020  1:25 PM CDT -----  Negative culture results, please call to notify patient and refer to PCP for further eval if continues to have symptoms

## 2020-08-20 NOTE — TELEPHONE ENCOUNTER
Spoke with pt's Mother(caregiver).  Notified regarding negative urine culture results.  Understanding expressed.  Mother reports pt is still c/o back pain.  Stated she would consult with GI.  She will call with any questions or concern.

## 2020-08-25 ENCOUNTER — LAB VISIT (OUTPATIENT)
Dept: PRIMARY CARE CLINIC | Facility: CLINIC | Age: 30
End: 2020-08-25
Payer: MEDICAID

## 2020-08-25 DIAGNOSIS — Z01.818 PRE-OP TESTING: ICD-10-CM

## 2020-08-25 PROCEDURE — U0003 INFECTIOUS AGENT DETECTION BY NUCLEIC ACID (DNA OR RNA); SEVERE ACUTE RESPIRATORY SYNDROME CORONAVIRUS 2 (SARS-COV-2) (CORONAVIRUS DISEASE [COVID-19]), AMPLIFIED PROBE TECHNIQUE, MAKING USE OF HIGH THROUGHPUT TECHNOLOGIES AS DESCRIBED BY CMS-2020-01-R: HCPCS

## 2020-08-25 NOTE — PROGRESS NOTES
"Audio Visit   The patient location is: mom's work   The chief complaint leading to consultation is: Esophageal stricture   Visit type: Virtual visit with synchronous audio and video  Total time spent with patient's mom : 60 minutes   Each patient to whom he or she provides medical services by telemedicine is:  (1) informed of the relationship between the physician and patient and the respective role of any other health care provider with respect to management of the patient; and (2) notified that he or she may decline to receive medical services by telemedicine and may withdraw from such care at any time.    Notes: Danae had attended  [facility] program daily before COVID  and brought her lunch    GI NUTRITIONAL ASSESSMENT  Referring Provider: Dr. Domingo Juddory Abadie Bradbury is a 30 y.o. female referred regarding the following problems:  Reason for Visit: Nutrition assessment and recommendations related to:   Chief Complaint   Patient presents with    Esophageal Stricture     improved with liquids/purees     Gastroesophageal Reflux     prefers high fat convenience foods     Down Syndrome     Mom notes resistance to changes in diet - prefers breads, pizza, etc which often trigger choking        Symptoms: Episodes of foods getting 'stuck"  when eating low moisture foods   Patient Nutrition Goals : absence of episodes involving foods getting stuck   Attending Visit: Patient's mom     Medical/Surgical History  Pertinent Social History:  Social History     Tobacco Use    Smoking status: Never Smoker   Substance Use Topics    Alcohol use: No    Drug use: Never        Previous Medical History:  Past Medical History:   Diagnosis Date    Down's syndrome     Thyroid disease        Previous Surgical History:  Past Surgical History:   Procedure Laterality Date    APPENDECTOMY      ESOPHAGOGASTRODUODENOSCOPY N/A 1/27/2020    Procedure: EGD (ESOPHAGOGASTRODUODENOSCOPY);  Surgeon: Violette Garrett MD;  " "Location: [facility] ENDO;  Service: Endoscopy;  Laterality: N/A;    ESOPHAGOGASTRODUODENOSCOPY N/A 7/14/2020    Procedure: EGD (ESOPHAGOGASTRODUODENOSCOPY);  Surgeon: Domingo Durand MD;  Location: [facility] (2ND FLR);  Service: Endoscopy;  Laterality: N/A;  Urgent EGD 2nd floor for airway protection due to difficult intubation airway anatomy for esophageal dilation.  Please schedule her next week 2nd floor for dysphagia.     COVID test at [place] on 7/11/20-GT    esposgus      TONGUE SURGERY      TONSILLECTOMY     7/9/20 Esophagram I  Impression:     1. Esophageal dysmotility, as above.  2. An area of mild focal narrowing is identified at the distal esophagus in this patient with history of EGD dilation of Schatzki ring.  Clinical correlation is advised.  3. Gastroesophageal reflux disease, as above.  4. Small size hiatal hernia   Anthropometric Data Usual Weight: 190's   has increased 10 pounds over last year   Estimated body mass index is 51.06 kg/m² as calculated from the following:    Height as of 8/17/20: 4' 5" (1.346 m).    Weight as of 8/18/20: 92.5 kg (204 lb).  Weight History:  Wt Readings from Last 12 Encounters:   08/18/20 92.5 kg (204 lb)   08/17/20 92.1 kg (203 lb)   08/12/20 92.4 kg (203 lb 11.3 oz)   07/14/20 90.7 kg (200 lb)   07/09/20 90.7 kg (200 lb)   01/27/20 90.7 kg (200 lb)   11/25/19 89.8 kg (198 lb)   05/30/19 89.8 kg (198 lb)   12/23/18 86.2 kg (190 lb)   10/04/18 86.5 kg (190 lb 9.6 oz)   06/19/18 88.2 kg (194 lb 6.4 oz)   02/15/18 88.2 kg (194 lb 6.4 oz)   ]  BMI: There is no height or weight on file to calculate BMI.  Calculated calorie needs : 1500 calories using Calorie estimate per cm of height in Downs syndrome   Calculated Protein needs : 70 grams protein   Nutrition Focused Physical Findings:   Unable to assess via video    Meds  and Biochemical Data   Labs  @BRIEFLAB(GLU,K,PHOS,MG,CHOL,HDL,LDL,TRIG,ALBUMIN,PREALBUMIN,AMMONIA,HGBA1C,CALCIUM)  Lab Results   Component Value Date    " WBC 6.60 08/18/2020    HGB 14.7 08/18/2020    HCT 43.8 08/18/2020    MCV 97 08/18/2020     08/18/2020     Lab Results   Component Value Date    FERRITIN 111 07/09/2020     Lab Results   Component Value Date     08/18/2020    K 3.6 08/18/2020     08/18/2020    CO2 24 08/18/2020    GLU 86 08/18/2020    BUN 9 08/18/2020    CREATININE 1.2 08/18/2020    CALCIUM 8.7 08/18/2020    PROT 6.8 08/18/2020    ALBUMIN 3.7 08/18/2020    BILITOT 0.7 08/18/2020    ALKPHOS 82 08/18/2020    AST 14 (L) 08/18/2020    ALT 13 (L) 08/18/2020     Lab Results   Component Value Date    TSH 0.679 07/09/2020     Lab Results   Component Value Date    CRP 2.49 (H) 05/04/2018     Phosphorus   Date Value Ref Range Status   05/04/2018 4.0 2.5 - 4.9 mg/dL      No results found for: PREALBUMIN  No results found for: CHOL  Iron   Date Value Ref Range Status   07/09/2020 65 30 - 160 ug/dL Final     Folate   Date Value Ref Range Status   07/09/2020 3.6 (L) 4.0 - 24.0 ng/mL Final     No components found for: HNFBHZPB47  No components found for: IKCESWPE09  No components found for: VITAMIND2  No components found for: VITAMIND    Lab Results   Component Value Date    FERRITIN 111 07/09/2020     Lab Results   Component Value Date    CRP 2.49 (H) 05/04/2018     No results found for: SEDRATE  Assessment of Lab Values:   Medication:  Current Outpatient Medications   Medication Sig    budesonide 1 mg/2 mL NbSp budesonide 2 mg/day, usually in divided dose of 1 mg/2mL budesonide mixed with 5 g sucralose orally for 8 weeks.    cetirizine (ZYRTEC) 1 mg/mL syrup Take by mouth once daily.    cholecalciferol, vitamin D3, (VITAMIN D3) 50 mcg (2,000 unit) Cap Take 1 capsule (2,000 Units total) by mouth once daily.    cyanocobalamin (VITAMIN B-12) 1000 MCG tablet cyanocobalamin (vit B-12) 1,000 mcg tablet   Take 1 tablet every day by oral route for 90 days.    ergocalciferol (ERGOCALCIFEROL) 50,000 unit Cap Take 1 capsule (50,000 Units total) by  mouth every 7 days. for 12 doses    escitalopram oxalate (LEXAPRO) 20 MG tablet Take 1 tablet by mouth once daily.    estradiol cypionate (DEPO-ESTRADIOL) 5 mg/mL injection Inject into the muscle every 28 days.    ferrous sulfate (FEOSOL) 325 mg (65 mg iron) Tab tablet Take 1 tablet (325 mg total) by mouth every 12 (twelve) hours.    folic acid (FOLVITE) 1 MG tablet Take 1 tablet (1 mg total) by mouth once daily.    GAVILYTE-G 236-22.74-6.74 -5.86 gram suspension MIX AND DRINK PO ONCE FOR 1 DOSE UTD    hydrocortisone 2.5 % cream EMEKA SML AMT EXT AA BID    levothyroxine (SYNTHROID) 75 MCG tablet Take 75 mcg by mouth once daily.     levothyroxine (SYNTHROID) 88 MCG tablet Take 88 mcg by mouth once daily.    medroxyPROGESTERone (DEPO-PROVERA) 150 mg/mL injection     methocarbamoL (ROBAXIN) 500 MG Tab Take 1 tablet (500 mg total) by mouth 4 (four) times daily. for 10 days    mupirocin calcium 2% nasl oint (BACTROBAN NASAL) 2 % Oint mupirocin 2 % topical ointment   APPLY A SMALL AMOUNT TO THE AFFECTED AREA BY TOPICAL ROUTE 3 TIMES PER DAY    pantoprazole (PROTONIX) 40 MG tablet Take 1 tablet (40 mg total) by mouth every 12 (twelve) hours. Take one pill every morning 45 minutes before breakfast and 45 min before dinner     No current facility-administered medications for this visit.      Nutritionally significant meds:  Vitamin/Supplements/Herbs: Vit D3, B12, folic acid, ferrous sulfate    Potential Food drug Interaction:   Allergies:  Review of patient's allergies indicates:   Allergen Reactions    Amoxicillin Hives    Augmentin [amoxicillin-pot clavulanate] Rash    Bactrim [sulfamethoxazole-trimethoprim] Rash    Duricef [cefadroxil] Rash    Keflex [cephalexin] Rash    Rifampin Rash      Dietary Data  Mom recalls reflux as a baby     Current Diet: prefers ham sandwiches, burgers as food of choice (used to bring ham sandwiches to [facility] everyday for lunch) but mom notes she does not adequately chew bolus  "before swallowing and prefers few condiments on sandwiches which might lubricate bolus for swallowing   Diet Recall: Breakfast : will consume a smoothie ( friend suggested gladiator from [employer] - lowest surgar/calorie .   Lunch : Ham sandwich   Supper ; prefers french fries and sandwich but mom tries to offer rice mixed with hamburger or mac and cheese .   Meal patterns: 3 meals daily  Fluid Intake /quantity: Coke > coke Zero ; little water -refuses it   Fruit: little interest   Vegetables: mom admits she does not consume such so is not likely to prepare /offer   Meal preparation/shopping: mom  Dining out: Regular, 2-3 times per week prior to COVID   Foods poorly tolerated : low moisture ( bread, rice, meat)   Milk tolerance; dislikes except for cheese    Fiber tolerance : consumes few fruit /vegetables   Nuts/seeds; as nut butter   Fat tolerance : likes butter, cheese added to foods but gravy to a lesser extent   Sugars and desserts; likes but prefers savory ( chips)   Supplements/ MVI: none   Review of patient's allergies indicates:   Allergen Reactions    Amoxicillin Hives    Augmentin [amoxicillin-pot clavulanate] Rash    Bactrim [sulfamethoxazole-trimethoprim] Rash    Duricef [cefadroxil] Rash    Keflex [cephalexin] Rash    Rifampin Rash      Nutrition Diagnosis: Altered GI Function related to Esophageal Dysmotility as evidenced by past episodes of low moisture foods getting "stuck" and improved with use of liquid washes between bites and mechanical soft food texture     Goals/Recommendations: Easy to chew and Swallow diet /condiments added to all breads for ease of swallow - open faced sandwiches consumed with fork or spoon rather than biting into bread; use of crock pot meals ; use of casserolle type foods at meals , frozen weight watcher meals , lean cuisine, healthy choice as they are low in fat and will not trigger reflux and are high in moisture   Education Provided by email Easy to chew and " swallow diet - [facility] GI Nutrition ; discussed the role of reflux in development of scar tissue (schatztki ring) and need to limit triggers ( large meals, carbonated beverages, chocolate, fried foods - Danae's favorites )   Patient and/or family comprehend instructions: yes  Outcome: Verbalizes understanding  Monitoring: for episodes of dysmotility   Follow-up: I added myself as a provider   Counseling Time: 60 minutes

## 2020-08-25 NOTE — PATIENT INSTRUCTIONS
"Tips to Control Acid Reflux  To control acid reflux, youll need to make some basic diet and lifestyle changes. The simple steps outlined below may be all youll need to relieve discomfort.  Watch What You Eat    · Avoid fatty foods and spicy foods.  · Eat fewer acidic foods, such as citrus and tomato-based foods. These can increase symptoms.  · Limit drinking alcohol, caffeine, and fizzy beverages. All increase acid reflux.  · Try limiting chocolate, peppermint, and spearmint. These can worsen acid reflux in some people.  Watch When You Eat  · Avoid lying down for 3 hours after eating.  · Do not snack before going to bed.  Raise Your Head    Raising your head and upper body by 4" to 6" helps limit reflux when youre lying down. Put blocks under the head of the bed frame to raise it.  Other Changes  · Lose weight, if you need to.  · Dont work out near bedtime.  · Avoid tight-fitting clothes.  · Limit aspirin and ibuprofen.  · Stop smoking.   © 7848-0368 [facility], [address] [place] [address], [place] [address]. All rights reserved. This information is not intended as a substitute for professional medical care. Always follow your healthcare professional's instructions.What Is GERD?  If you feel a painful burning sensation in your chest after you eat, you may have gastroesophageal reflux disease (GERD). Heartburn is a classic symptom of GERD, but you may have other symptoms as well.  Note: Chest pain may also be caused by heart problems. Be sure to have all chest pain evaluated by a doctor.   When You Have a Reflux Problem     With GERD, the weak LES allows food and fluids to travel back, or reflux, into the esophagus.    After you eat, food travels from your mouth down the esophagus to your stomach. Along the way, food passes through a one-way valve called the lower esophageal sphincter (LES), the opening to your stomach. Normally the LES opens when you swallow. It allows food to enter the stomach, then closes " quickly. With GERD, the LES doesnt work normally. It allows food and stomach acid to travel back (reflux) into the esophagus.  Some Common Symptoms  · Frequent heartburn or burping  · Sour-tasting fluid backing up into your mouth  · Symptoms that get worse after you eat, bend over, or lie down  · Difficulty or pain when swallowing  Relieving Your Discomfort  You and your health care provider can work together to find the treatment options that best relieve your symptoms. These may include lifestyle changes, medication, and possibly surgery.  © 8253-4490 [employer], [address] [place] [address], [place] [address]. All rights reserved. This information is not intended as a substitute for professional medical care. Always follow your healthcare professional's instructions.

## 2020-08-26 LAB — SARS-COV-2 RNA RESP QL NAA+PROBE: NOT DETECTED

## 2020-08-27 ENCOUNTER — ANESTHESIA EVENT (OUTPATIENT)
Dept: ENDOSCOPY | Facility: HOSPITAL | Age: 30
End: 2020-08-27
Payer: MEDICAID

## 2020-08-27 NOTE — ANESTHESIA PREPROCEDURE EVALUATION
08/27/2020  Mallory Abadie Bradbury is a 30 y.o., female.    Pre-operative evaluation for Procedure(s) (LRB):  EGD (ESOPHAGOGASTRODUODENOSCOPY) (N/A)  COLONOSCOPY (N/A)    Mallory Abadie Bradbury is a 30 y.o. female w/ morbid obesity and Downs. Will not obtain UPT prior to procedure. Dad aware of risks and OK to proceed.     Pt does not do well with ketamine per dad.    Patient Active Problem List   Diagnosis    Bilateral foot pain    Corn or callus - Right Foot    Dysphagia       Review of patient's allergies indicates:   Allergen Reactions    Amoxicillin Hives    Augmentin [amoxicillin-pot clavulanate] Rash    Bactrim [sulfamethoxazole-trimethoprim] Rash    Duricef [cefadroxil] Rash    Keflex [cephalexin] Rash    Rifampin Rash        No current facility-administered medications on file prior to encounter.      Current Outpatient Medications on File Prior to Encounter   Medication Sig Dispense Refill    cetirizine (ZYRTEC) 1 mg/mL syrup Take by mouth once daily.      cholecalciferol, vitamin D3, (VITAMIN D3) 50 mcg (2,000 unit) Cap Take 1 capsule (2,000 Units total) by mouth once daily. 90 capsule 3    ergocalciferol (ERGOCALCIFEROL) 50,000 unit Cap Take 1 capsule (50,000 Units total) by mouth every 7 days. for 12 doses 12 capsule 0    escitalopram oxalate (LEXAPRO) 20 MG tablet Take 1 tablet by mouth once daily.      estradiol cypionate (DEPO-ESTRADIOL) 5 mg/mL injection Inject into the muscle every 28 days.      ferrous sulfate (FEOSOL) 325 mg (65 mg iron) Tab tablet Take 1 tablet (325 mg total) by mouth every 12 (twelve) hours. 60 tablet 4    folic acid (FOLVITE) 1 MG tablet Take 1 tablet (1 mg total) by mouth once daily. 30 tablet 11    hydrocortisone 2.5 % cream EMEKA SML AMT EXT AA BID  1    levothyroxine (SYNTHROID) 75 MCG tablet Take 75 mcg by mouth once daily.       levothyroxine  (SYNTHROID) 88 MCG tablet Take 88 mcg by mouth once daily.      medroxyPROGESTERone (DEPO-PROVERA) 150 mg/mL injection   2       Past Surgical History:   Procedure Laterality Date    APPENDECTOMY      ESOPHAGOGASTRODUODENOSCOPY N/A 1/27/2020    Procedure: EGD (ESOPHAGOGASTRODUODENOSCOPY);  Surgeon: Violette Garrett MD;  [facility]: [facility];  Service: Endoscopy;  Laterality: N/A;    ESOPHAGOGASTRODUODENOSCOPY N/A 7/14/2020    Procedure: EGD (ESOPHAGOGASTRODUODENOSCOPY);  Surgeon: Domingo Duradn MD;  Location: [facility] ([facility] FLR);  Service: Endoscopy;  Laterality: N/A;  Urgent EGD 2nd floor for airway protection due to difficult intubation airway anatomy for esophageal dilation.  Please schedule her next week 2nd floor for dysphagia.     COVID test at [place] on 7/11/20-GT    esposgus      TONGUE SURGERY      TONSILLECTOMY         Social History     Socioeconomic History    Marital status: Single     Spouse name: Not on file    Number of children: Not on file    Years of education: Not on file    Highest education level: Not on file   Occupational History    Not on file   Social Needs    Financial resource strain: Not on file    Food insecurity     Worry: Not on file     Inability: Not on file    Transportation needs     Medical: Not on file     Non-medical: Not on file   Tobacco Use    Smoking status: Never Smoker   Substance and Sexual Activity    Alcohol use: No    Drug use: Never    Sexual activity: Never   Lifestyle    Physical activity     Days per week: Not on file     Minutes per session: Not on file    Stress: Not on file   Relationships    Social connections     Talks on phone: Not on file     Gets together: Not on file     Attends Christian service: Not on file     Active member of club or organization: Not on file     Attends meetings of clubs or organizations: Not on file     Relationship status: Not on file   Other Topics Concern    Not on file   Social History Narrative     Patient with Down syndrome         Vital Signs Range (Last 24H):         CBC: No results for input(s): WBC, RBC, HGB, HCT, PLT, MCV, MCH, MCHC in the last 72 hours.    CMP: No results for input(s): NA, K, CL, CO2, BUN, CREATININE, GLU, MG, PHOS, CALCIUM, ALBUMIN, PROT, ALKPHOS, ALT, AST, BILITOT in the last 72 hours.    INR  No results for input(s): PT, INR, PROTIME, APTT in the last 72 hours.        Diagnostic Studies:      EKG:  Vent. Rate : 060 BPM     Atrial Rate : 060 BPM      P-R Int : 126 ms          QRS Dur : 084 ms       QT Int : 412 ms       P-R-T Axes : 032 056 029 degrees      QTc Int : 412 ms     Normal sinus rhythm with sinus arrhythmia   Normal ECG   When compared with ECG of 24-OCT-2013 09:42,   No significant change was found   Confirmed by ERIK RIDER MD ([id number]) on 11/25/2019 4:51:38 PM     2D Echo:          Anesthesia Evaluation    I have reviewed the Patient Summary Reports.    I have reviewed the Nursing Notes. I have reviewed the NPO Status.   I have reviewed the Medications.     Review of Systems  Anesthesia Hx:  Hx of Anesthetic complications Pt does not do well with ketamine per dad. History of prior surgery of interest to airway management or planning: Personal Hx of Anesthesia complications   Social:  Non-Smoker    Hematology/Oncology:  Hematology Normal   Oncology Normal     EENT/Dental:EENT/Dental Normal   Cardiovascular:   Exercise tolerance: good Denies Hypertension.  Denies CAD.     Denies Angina.  Functional Capacity Can you climb two flights of stairs? ==> Yes    Pulmonary:   Denies Asthma.  Denies Recent URI.  Denies Sleep Apnea.    Renal/:  Renal/ Normal     Hepatic/GI:   Denies PUD. Denies Hiatal Hernia.  Denies GERD. Denies Liver Disease.  Denies Hepatitis.    Neurological:   Denies CVA. Denies Seizures.       Down's syndrome         Endocrine:   Denies Diabetes. Denies Hypothyroidism.        Physical Exam  General:  Well nourished    Airway/Jaw/Neck:  Airway Findings: Mouth  Opening: Small, but > 3cm Tongue: Large  General Airway Assessment: Adult, Possible difficult intubation, Possible difficult mask airway  Mallampati: IV  Improves to III with phonation.  TM Distance: Normal, at least 6 cm  Jaw/Neck Findings:  Neck ROM: Normal ROM  Neck Findings:  Girth Increased      Dental:  Dental Findings: In tact        Mental Status:  Mental Status Findings:  Cooperative         Anesthesia Plan  Type of Anesthesia, risks & benefits discussed:  Anesthesia Type:  general  Patient's Preference: Proceed with anesthesia understanding that the risks are very small but could be serious or life threatening.  Intra-op Monitoring Plan: standard ASA monitors  Intra-op Monitoring Plan Comments:   Post Op Pain Control Plan:   Post Op Pain Control Plan Comments:   Induction:   IV  Beta Blocker:  Patient is not currently on a Beta-Blocker (No further documentation required).       Informed Consent: Patient understands risks and agrees with Anesthesia plan.  Questions answered. Anesthesia consent signed with patient.  ASA Score: 3     Day of Surgery Review of History & Physical: I have interviewed and examined the patient. I have reviewed the patient's H&P dated:    H&P update referred to the provider.         Ready For Surgery From Anesthesia Perspective.

## 2020-08-28 ENCOUNTER — HOSPITAL ENCOUNTER (OUTPATIENT)
Facility: HOSPITAL | Age: 30
Discharge: HOME | End: 2020-08-28
Attending: INTERNAL MEDICINE | Admitting: INTERNAL MEDICINE
Payer: MEDICAID

## 2020-08-28 ENCOUNTER — ANESTHESIA (OUTPATIENT)
Dept: ENDOSCOPY | Facility: HOSPITAL | Age: 30
End: 2020-08-28
Payer: MEDICAID

## 2020-08-28 VITALS
BODY MASS INDEX: 44.99 KG/M2 | HEART RATE: 72 BPM | OXYGEN SATURATION: 100 % | TEMPERATURE: 98 F | DIASTOLIC BLOOD PRESSURE: 71 MMHG | WEIGHT: 200 LBS | SYSTOLIC BLOOD PRESSURE: 126 MMHG | RESPIRATION RATE: 18 BRPM | HEIGHT: 56 IN

## 2020-08-28 DIAGNOSIS — E61.1 IRON DEFICIENCY: ICD-10-CM

## 2020-08-28 PROCEDURE — 43249 PR EGD, FLEX, W/BALL DILATION, < 30MM: ICD-10-PCS | Mod: ,,, | Performed by: INTERNAL MEDICINE

## 2020-08-28 PROCEDURE — 00813 ANES UPR LWR GI NDSC PX: CPT | Performed by: INTERNAL MEDICINE

## 2020-08-28 PROCEDURE — 43249 ESOPH EGD DILATION <30 MM: CPT | Mod: ,,, | Performed by: INTERNAL MEDICINE

## 2020-08-28 PROCEDURE — D9220A PRA ANESTHESIA: ICD-10-PCS | Mod: CRNA,,, | Performed by: NURSE ANESTHETIST, CERTIFIED REGISTERED

## 2020-08-28 PROCEDURE — 43249 ESOPH EGD DILATION <30 MM: CPT | Performed by: INTERNAL MEDICINE

## 2020-08-28 PROCEDURE — 25000003 PHARM REV CODE 250: Performed by: NURSE ANESTHETIST, CERTIFIED REGISTERED

## 2020-08-28 PROCEDURE — 25000003 PHARM REV CODE 250: Performed by: INTERNAL MEDICINE

## 2020-08-28 PROCEDURE — 63600175 PHARM REV CODE 636 W HCPCS: Performed by: NURSE ANESTHETIST, CERTIFIED REGISTERED

## 2020-08-28 PROCEDURE — 37000008 HC ANESTHESIA 1ST 15 MINUTES: Performed by: INTERNAL MEDICINE

## 2020-08-28 PROCEDURE — C1726 CATH, BAL DIL, NON-VASCULAR: HCPCS | Performed by: INTERNAL MEDICINE

## 2020-08-28 PROCEDURE — D9220A PRA ANESTHESIA: Mod: ANES,,, | Performed by: STUDENT IN AN ORGANIZED HEALTH CARE EDUCATION/TRAINING PROGRAM

## 2020-08-28 PROCEDURE — 37000009 HC ANESTHESIA EA ADD 15 MINS: Performed by: INTERNAL MEDICINE

## 2020-08-28 PROCEDURE — 88305 TISSUE EXAM BY PATHOLOGIST: ICD-10-PCS | Mod: 26,,, | Performed by: PATHOLOGY

## 2020-08-28 PROCEDURE — D9220A PRA ANESTHESIA: Mod: CRNA,,, | Performed by: NURSE ANESTHETIST, CERTIFIED REGISTERED

## 2020-08-28 PROCEDURE — 43239 EGD BIOPSY SINGLE/MULTIPLE: CPT | Performed by: INTERNAL MEDICINE

## 2020-08-28 PROCEDURE — 27201012 HC FORCEPS, HOT/COLD, DISP: Performed by: INTERNAL MEDICINE

## 2020-08-28 PROCEDURE — 94761 N-INVAS EAR/PLS OXIMETRY MLT: CPT

## 2020-08-28 PROCEDURE — 43239 EGD BIOPSY SINGLE/MULTIPLE: CPT | Mod: 59,,, | Performed by: INTERNAL MEDICINE

## 2020-08-28 PROCEDURE — 45380 PR COLONOSCOPY,BIOPSY: ICD-10-PCS | Mod: ,,, | Performed by: INTERNAL MEDICINE

## 2020-08-28 PROCEDURE — 43239 PR EGD, FLEX, W/BIOPSY, SGL/MULTI: ICD-10-PCS | Mod: 59,,, | Performed by: INTERNAL MEDICINE

## 2020-08-28 PROCEDURE — 88305 TISSUE EXAM BY PATHOLOGIST: CPT | Performed by: PATHOLOGY

## 2020-08-28 PROCEDURE — 45380 COLONOSCOPY AND BIOPSY: CPT | Performed by: INTERNAL MEDICINE

## 2020-08-28 PROCEDURE — 88305 TISSUE EXAM BY PATHOLOGIST: CPT | Mod: 26,,, | Performed by: PATHOLOGY

## 2020-08-28 PROCEDURE — D9220A PRA ANESTHESIA: ICD-10-PCS | Mod: ANES,,, | Performed by: STUDENT IN AN ORGANIZED HEALTH CARE EDUCATION/TRAINING PROGRAM

## 2020-08-28 PROCEDURE — 45380 COLONOSCOPY AND BIOPSY: CPT | Mod: ,,, | Performed by: INTERNAL MEDICINE

## 2020-08-28 RX ORDER — DEXAMETHASONE SODIUM PHOSPHATE 4 MG/ML
INJECTION, SOLUTION INTRA-ARTICULAR; INTRALESIONAL; INTRAMUSCULAR; INTRAVENOUS; SOFT TISSUE
Status: DISCONTINUED | OUTPATIENT
Start: 2020-08-28 | End: 2020-08-28

## 2020-08-28 RX ORDER — PROPOFOL 10 MG/ML
VIAL (ML) INTRAVENOUS
Status: DISCONTINUED | OUTPATIENT
Start: 2020-08-28 | End: 2020-08-28

## 2020-08-28 RX ORDER — LIDOCAINE HCL/PF 100 MG/5ML
SYRINGE (ML) INTRAVENOUS
Status: DISCONTINUED | OUTPATIENT
Start: 2020-08-28 | End: 2020-08-28

## 2020-08-28 RX ORDER — ONDANSETRON HYDROCHLORIDE 2 MG/ML
INJECTION, SOLUTION INTRAMUSCULAR; INTRAVENOUS
Status: DISCONTINUED | OUTPATIENT
Start: 2020-08-28 | End: 2020-08-28

## 2020-08-28 RX ORDER — FENTANYL CITRATE 50 UG/ML
INJECTION, SOLUTION INTRAMUSCULAR; INTRAVENOUS
Status: DISCONTINUED | OUTPATIENT
Start: 2020-08-28 | End: 2020-08-28

## 2020-08-28 RX ORDER — ROCURONIUM BROMIDE 10 MG/ML
INJECTION, SOLUTION INTRAVENOUS
Status: DISCONTINUED | OUTPATIENT
Start: 2020-08-28 | End: 2020-08-28

## 2020-08-28 RX ORDER — SODIUM CHLORIDE 9 MG/ML
INJECTION, SOLUTION INTRAVENOUS CONTINUOUS
Status: DISCONTINUED | OUTPATIENT
Start: 2020-08-28 | End: 2020-08-28 | Stop reason: HOSPADM

## 2020-08-28 RX ORDER — SUCCINYLCHOLINE CHLORIDE 20 MG/ML
INJECTION INTRAMUSCULAR; INTRAVENOUS
Status: DISCONTINUED | OUTPATIENT
Start: 2020-08-28 | End: 2020-08-28

## 2020-08-28 RX ORDER — PANTOPRAZOLE SODIUM 40 MG/1
40 TABLET, DELAYED RELEASE ORAL
Qty: 90 TABLET | Refills: 3 | Status: ON HOLD | OUTPATIENT
Start: 2020-08-28 | End: 2022-04-13 | Stop reason: SDUPTHER

## 2020-08-28 RX ORDER — GLYCOPYRROLATE 0.2 MG/ML
INJECTION INTRAMUSCULAR; INTRAVENOUS
Status: DISCONTINUED | OUTPATIENT
Start: 2020-08-28 | End: 2020-08-28

## 2020-08-28 RX ORDER — SODIUM CHLORIDE 0.9 % (FLUSH) 0.9 %
10 SYRINGE (ML) INJECTION
Status: DISCONTINUED | OUTPATIENT
Start: 2020-08-28 | End: 2020-08-28 | Stop reason: HOSPADM

## 2020-08-28 RX ADMIN — PROPOFOL 150 MG: 10 INJECTION, EMULSION INTRAVENOUS at 08:08

## 2020-08-28 RX ADMIN — SUCCINYLCHOLINE CHLORIDE 160 MG: 20 INJECTION, SOLUTION INTRAMUSCULAR; INTRAVENOUS at 08:08

## 2020-08-28 RX ADMIN — FENTANYL CITRATE 25 MCG: 50 INJECTION, SOLUTION INTRAMUSCULAR; INTRAVENOUS at 08:08

## 2020-08-28 RX ADMIN — GLYCOPYRROLATE 0.1 MG: 0.2 INJECTION, SOLUTION INTRAMUSCULAR; INTRAVENOUS at 08:08

## 2020-08-28 RX ADMIN — PROPOFOL 50 MG: 10 INJECTION, EMULSION INTRAVENOUS at 08:08

## 2020-08-28 RX ADMIN — Medication 100 MG: at 08:08

## 2020-08-28 RX ADMIN — FENTANYL CITRATE 75 MCG: 50 INJECTION, SOLUTION INTRAMUSCULAR; INTRAVENOUS at 08:08

## 2020-08-28 RX ADMIN — DEXAMETHASONE SODIUM PHOSPHATE 4 MG: 4 INJECTION, SOLUTION INTRAMUSCULAR; INTRAVENOUS at 08:08

## 2020-08-28 RX ADMIN — ROCURONIUM BROMIDE 5 MG: 10 INJECTION, SOLUTION INTRAVENOUS at 08:08

## 2020-08-28 RX ADMIN — ONDANSETRON 4 MG: 2 INJECTION, SOLUTION INTRAMUSCULAR; INTRAVENOUS at 08:08

## 2020-08-28 RX ADMIN — SODIUM CHLORIDE: 0.9 INJECTION, SOLUTION INTRAVENOUS at 07:08

## 2020-08-28 NOTE — TRANSFER OF CARE
"Anesthesia Transfer of Care Note    Patient: Mallory Abadie Bradbury    Procedure(s) Performed: Procedure(s) (LRB):  EGD (ESOPHAGOGASTRODUODENOSCOPY) (N/A)  COLONOSCOPY (N/A)    Patient location: PACU    Anesthesia Type: general    Transport from OR: Transported from OR on room air with adequate spontaneous ventilation    Post pain: adequate analgesia    Post assessment: no apparent anesthetic complications and tolerated procedure well    Post vital signs: stable    Level of consciousness: awake    Nausea/Vomiting: no nausea/vomiting    Complications: none    Transfer of care protocol was followed      Last vitals:   Visit Vitals  /66 (BP Location: Right arm, Patient Position: Lying)   Pulse 91   Temp 36.3 °C (97.4 °F) (Temporal)   Resp 14   Ht 4' 8" (1.422 m)   Wt 90.7 kg (200 lb)   SpO2 99%   Breastfeeding No   BMI 44.84 kg/m²     "

## 2020-08-28 NOTE — H&P
Short Stay Endoscopy History and Physical    PCP - Lizzie Ernst NP    Procedure - EGD/Colonoscopy  ASA - per anesthesia  Mallampati - per anesthesia  History of Anesthesia problems - no  Family history Anesthesia problems -  no   Plan of anesthesia - MAC    HPI:  This is a 30 y.o. female here for evaluation of : history of downs  syndrome, obesity, ESTELA, previous EGD 7/14.  Previously dilated with balloon dilation.      EGD - iron deficiency  Colon - iron deficiency    ROS:  Constitutional: No fevers, chills, No weight loss  CV: No chest pain  Pulm: No cough, No shortness of breath  Ophtho: No vision changes  GI: see HPI  Derm: No rash    Medical History:  has a past medical history of Down's syndrome and Thyroid disease.    Surgical History:  has a past surgical history that includes Tonsillectomy; Appendectomy; Tongue surgery; esposgus; Esophagogastroduodenoscopy (N/A, 1/27/2020); and Esophagogastroduodenoscopy (N/A, 7/14/2020).    Family History: family history includes Colon cancer (age of onset: 60) in her paternal grandfather.. Otherwise no colon cancer, inflammatory bowel disease, or GI malignancies.    Social History:  reports that she has never smoked. She does not have any smokeless tobacco history on file. She reports that she does not drink alcohol or use drugs.    Review of patient's allergies indicates:   Allergen Reactions    Amoxicillin Hives    Augmentin [amoxicillin-pot clavulanate] Rash    Bactrim [sulfamethoxazole-trimethoprim] Rash    Duricef [cefadroxil] Rash    Keflex [cephalexin] Rash    Rifampin Rash       Medications:   Medications Prior to Admission   Medication Sig Dispense Refill Last Dose    budesonide 1 mg/2 mL NbSp budesonide 2 mg/day, usually in divided dose of 1 mg/2mL budesonide mixed with 5 g sucralose orally for 8 weeks. 100 mL 0     cetirizine (ZYRTEC) 1 mg/mL syrup Take by mouth once daily.       cholecalciferol, vitamin D3, (VITAMIN D3) 50 mcg (2,000 unit)  Cap Take 1 capsule (2,000 Units total) by mouth once daily. 90 capsule 3     cyanocobalamin (VITAMIN B-12) 1000 MCG tablet cyanocobalamin (vit B-12) 1,000 mcg tablet   Take 1 tablet every day by oral route for 90 days.       ergocalciferol (ERGOCALCIFEROL) 50,000 unit Cap Take 1 capsule (50,000 Units total) by mouth every 7 days. for 12 doses 12 capsule 0     escitalopram oxalate (LEXAPRO) 20 MG tablet Take 1 tablet by mouth once daily.       estradiol cypionate (DEPO-ESTRADIOL) 5 mg/mL injection Inject into the muscle every 28 days.       ferrous sulfate (FEOSOL) 325 mg (65 mg iron) Tab tablet Take 1 tablet (325 mg total) by mouth every 12 (twelve) hours. 60 tablet 4     folic acid (FOLVITE) 1 MG tablet Take 1 tablet (1 mg total) by mouth once daily. 30 tablet 11     GAVILYTE-G 236-22.74-6.74 -5.86 gram suspension MIX AND DRINK PO ONCE FOR 1 DOSE UTD       hydrocortisone 2.5 % cream EMEKA SML AMT EXT AA BID  1     levothyroxine (SYNTHROID) 75 MCG tablet Take 75 mcg by mouth once daily.        levothyroxine (SYNTHROID) 88 MCG tablet Take 88 mcg by mouth once daily.       medroxyPROGESTERone (DEPO-PROVERA) 150 mg/mL injection   2     [] methocarbamoL (ROBAXIN) 500 MG Tab Take 1 tablet (500 mg total) by mouth 4 (four) times daily. for 10 days 40 tablet 0     mupirocin calcium 2% nasl oint (BACTROBAN NASAL) 2 % Oint mupirocin 2 % topical ointment   APPLY A SMALL AMOUNT TO THE AFFECTED AREA BY TOPICAL ROUTE 3 TIMES PER DAY       pantoprazole (PROTONIX) 40 MG tablet Take 1 tablet (40 mg total) by mouth every 12 (twelve) hours. Take one pill every morning 45 minutes before breakfast and 45 min before dinner 60 tablet 3        Physical Exam:    Vital Signs: There were no vitals filed for this visit.    Gen: NAD, lying comfortably, obese  HENT: NCAT, oropharynx clear  Eyes: anicteric sclerae, EOMI grossly  Neck: supple, no visible masses/goiter  Cardiac: RRR  Lungs: non-labored breathing  Abd: soft,  NT/ND, normoactive BS  Ext: no LE edema, warm, well perfused  Skin: skin intact on exposed body parts, no visible rashes, lesions  Neuro: A&Ox4, neuro exam grossly intact, moves all extremities  Psych: appropriate mood, affect      Labs:  Lab Results   Component Value Date    WBC 6.60 08/18/2020    HGB 14.7 08/18/2020    HCT 43.8 08/18/2020     08/18/2020    ALT 13 (L) 08/18/2020    AST 14 (L) 08/18/2020     08/18/2020    K 3.6 08/18/2020     08/18/2020    CREATININE 1.2 08/18/2020    BUN 9 08/18/2020    CO2 24 08/18/2020    TSH 0.679 07/09/2020    INR 1.0 08/18/2020       Plan:  EGD/colon for iron deficiency    I have explained the risks and benefits of endoscopy procedures to the patient including but not limited to bleeding, perforation, infection, and death.  The patient was asked if they understand and allowed to ask any further questions to their satisfaction.    Jalen Alston MD

## 2020-08-28 NOTE — [employer] PATIENT INSTRUCTIONS
Discharge Summary/Instructions after an Endoscopic Procedure  Patient Name: Danae Ruff  Patient MRN: [id number]  Patient YOB: 1990 Friday, August 28, 2020  Domingo Durand MD  RESTRICTIONS:  During your procedure today, you received medications for sedation.  These   medications may affect your judgment, balance and coordination.  Therefore,   for 24 hours, you have the following restrictions:   - DO NOT drive a car, operate machinery, make legal/financial decisions,   sign important papers or drink alcohol.    ACTIVITY:  Today: no heavy lifting, straining or running due to procedural   sedation/anesthesia.  The following day: return to full activity including work.  DIET:  Eat and drink normally unless instructed otherwise.     TREATMENT FOR COMMON SIDE EFFECTS:  - Mild abdominal pain, nausea, belching, bloating or excessive gas:  rest,   eat lightly and use a heating pad.  - Sore Throat: treat with throat lozenges and/or gargle with warm salt   water.  - Because air was used during the procedure, expelling large amounts of air   from your rectum or belching is normal.  - If a bowel prep was taken, you may not have a bowel movement for 1-3 days.    This is normal.  SYMPTOMS TO WATCH FOR AND REPORT TO YOUR PHYSICIAN:  1. Abdominal pain or bloating, other than gas cramps.  2. Chest pain.  3. Back pain.  4. Signs of infection such as: chills or fever occurring within 24 hours   after the procedure.  5. Rectal bleeding, which would show as bright red, maroon, or black stools.   (A tablespoon of blood from the rectum is not serious, especially if   hemorrhoids are present.)  6. Vomiting.  7. Weakness or dizziness.  GO DIRECTLY TO THE NEAREST EMERGENCY ROOM IF YOU HAVE ANY OF THE FOLLOWING:      Difficulty breathing              Chills and/or fever over 101 F   Persistent vomiting and/or vomiting blood   Severe abdominal pain   Severe chest pain   Black, tarry stools   Bleeding- more than one  tablespoon   Any other symptom or condition that you feel may need urgent attention  Your doctor recommends these additional instructions:  If any biopsies were taken, your doctors clinic will contact you in 1 to 2   weeks with any results.  - Discharge patient to home.   - Resume previous diet.   - Await pathology results.   - Telephone GI clinic for pathology results in 2 weeks.   - Use Protonix (pantoprazole) 40 mg PO daily.   - Use Protonix 40 mg once daily (or any other full strength proton pump   inhibitor) - best taken 45 minutes before your first protein containing   meal.   - The findings and recommendations were discussed with the patient.   - The findings and recommendations were discussed with the patient's family.     - Return to GI clinic at the next available appointment.  For questions, problems or results please call your physician - Domingo Durand MD at Work:  [phone].  [facility], EMERGENCY ROOM PHONE NUMBER: [phone]  IF A COMPLICATION OR EMERGENCY SITUATION ARISES AND YOU ARE UNABLE TO REACH   YOUR PHYSICIAN - GO DIRECTLY TO THE EMERGENCY ROOM.  Domingo Durand MD  8/28/2020 9:26:37 AM  This report has been verified and signed electronically.  [employer]

## 2020-08-28 NOTE — NURSING TRANSFER
Nursing Transfer Note      8/28/2020     Transfer To: [id number]    Transfer via stretcher    Transfer with n/a    Transported by RN    Medicines sent: n/a    Chart send with patient: Yes    Notified: eda    Patient reassessed at: 8/28/2020    Upon arrival to floor:

## 2020-08-28 NOTE — ANESTHESIA PROCEDURE NOTES
Intubation  Performed by: Simeon Sanchez CRNA  Authorized by: Simeon Sanchez CRNA     Intubation:     Induction:  Intravenous    Intubated:  Postinduction    Mask Ventilation:  Easy with oral airway    Attempts:  1    Attempted By:  CRNA    Method of Intubation:  Video laryngoscopy    Laryngeal View Grade: Grade I - full view of chords      Difficult Airway Encountered?: No      Complications:  None    Airway Device:  Oral endotracheal tube    Airway Device Size:  7.0    Style/Cuff Inflation:  Cuffed (inflated to minimal occlusive pressure)    Inflation Amount (mL):  6    Tube secured:  20    Secured at:  The teeth    Placement Verified By:  Capnometry    Complicating Factors:  None

## 2020-08-28 NOTE — [employer] PATIENT INSTRUCTIONS
Discharge Summary/Instructions after an Endoscopic Procedure  Patient Name: Danae Ruff  Patient MRN: [id number]  Patient YOB: 1990 Friday, August 28, 2020  Domingo Durand MD  RESTRICTIONS:  During your procedure today, you received medications for sedation.  These   medications may affect your judgment, balance and coordination.  Therefore,   for 24 hours, you have the following restrictions:   - DO NOT drive a car, operate machinery, make legal/financial decisions,   sign important papers or drink alcohol.    ACTIVITY:  Today: no heavy lifting, straining or running due to procedural   sedation/anesthesia.  The following day: return to full activity including work.  DIET:  Eat and drink normally unless instructed otherwise.     TREATMENT FOR COMMON SIDE EFFECTS:  - Mild abdominal pain, nausea, belching, bloating or excessive gas:  rest,   eat lightly and use a heating pad.  - Sore Throat: treat with throat lozenges and/or gargle with warm salt   water.  - Because air was used during the procedure, expelling large amounts of air   from your rectum or belching is normal.  - If a bowel prep was taken, you may not have a bowel movement for 1-3 days.    This is normal.  SYMPTOMS TO WATCH FOR AND REPORT TO YOUR PHYSICIAN:  1. Abdominal pain or bloating, other than gas cramps.  2. Chest pain.  3. Back pain.  4. Signs of infection such as: chills or fever occurring within 24 hours   after the procedure.  5. Rectal bleeding, which would show as bright red, maroon, or black stools.   (A tablespoon of blood from the rectum is not serious, especially if   hemorrhoids are present.)  6. Vomiting.  7. Weakness or dizziness.  GO DIRECTLY TO THE NEAREST EMERGENCY ROOM IF YOU HAVE ANY OF THE FOLLOWING:      Difficulty breathing              Chills and/or fever over 101 F   Persistent vomiting and/or vomiting blood   Severe abdominal pain   Severe chest pain   Black, tarry stools   Bleeding- more than one  tablespoon   Any other symptom or condition that you feel may need urgent attention  Your doctor recommends these additional instructions:  If any biopsies were taken, your doctors clinic will contact you in 1 to 2   weeks with any results.  - Discharge patient to home.   - Await pathology results.   - Telephone endoscopist for pathology results in 2 weeks.   - Repeat colonoscopy date to be determined after pending pathology results   are reviewed for surveillance based on pathology results.   - The findings and recommendations were discussed with the patient.   - The findings and recommendations were discussed with the patient's family.     - Return to GI clinic at the next available appointment.  For questions, problems or results please call your physician - Domingo Durand MD at Work:  [phone].  [facility], EMERGENCY ROOM PHONE NUMBER: [phone]  IF A COMPLICATION OR EMERGENCY SITUATION ARISES AND YOU ARE UNABLE TO REACH   YOUR PHYSICIAN - GO DIRECTLY TO THE EMERGENCY ROOM.  Doimngo Durand MD  8/28/2020 9:23:13 AM  This report has been verified and signed electronically.  [employer]

## 2020-09-01 LAB
FINAL PATHOLOGIC DIAGNOSIS: NORMAL
GROSS: NORMAL

## 2020-09-15 ENCOUNTER — TELEPHONE (OUTPATIENT)
Dept: GASTROENTEROLOGY | Facility: CLINIC | Age: 30
End: 2020-09-15

## 2020-09-15 NOTE — TELEPHONE ENCOUNTER
Called and spoke to pt's mother, tom.  Tom verbalized understanding of results.  Tom asked for pt to scheduled with fellow and Dr. Durand in clinic.  Tom accepted appt and appreciated the call.

## 2020-09-15 NOTE — TELEPHONE ENCOUNTER
----- Message from Domingo Durand MD sent at 9/12/2020  1:39 PM CDT -----  VERY IMPORTANT:    Jackelyn please tell Danae is mother Mallory or her father that Danae is EGD and colonoscopy pathology was benign no dysplasia and no microscopic colitis no eosinophilic esophagitis.      1.  Distal esophagus, biopsy:       -  Squamous mucosa with changes of gastroesophageal reflux disease   2.  Proximal esophagus, biopsy:       -  Squamous mucosa with no significant pathologic findings   Comment:  The above esophageal biopsies are negative for evidence of   eosinophilic esophagitis.  Evidence of Mead's esophagus is not identified.    No viral inclusions are present. There is no evidence of dysplasia or   malignancy.   3.  Rectosigmoid colon, biopsy:       -  Colonic mucosa with melanosis coli, otherwise unremarkable   Comment:  Features of neither collagenous or lymphocytic colitis are seen. No   evidence of active colitis or chronic primary inflammatory bowel disease is   identified. No granulomas, parasites or viral inclusion are present. There is   no evidence of dysplasia or malignancy.

## 2020-09-30 ENCOUNTER — OFFICE VISIT (OUTPATIENT)
Dept: ALLERGY | Facility: CLINIC | Age: 30
End: 2020-09-30
Payer: MEDICAID

## 2020-09-30 VITALS — HEIGHT: 56 IN | WEIGHT: 205.69 LBS | BODY MASS INDEX: 46.27 KG/M2

## 2020-09-30 DIAGNOSIS — K20.0 EOSINOPHILIC ESOPHAGITIS DUE TO FOOD: Primary | ICD-10-CM

## 2020-09-30 PROCEDURE — 99214 PR OFFICE/OUTPT VISIT, EST, LEVL IV, 30-39 MIN: ICD-10-PCS | Mod: S$PBB,,, | Performed by: STUDENT IN AN ORGANIZED HEALTH CARE EDUCATION/TRAINING PROGRAM

## 2020-09-30 PROCEDURE — 99999 PR PBB SHADOW E&M-EST. PATIENT-LVL III: ICD-10-PCS | Mod: PBBFAC,,, | Performed by: STUDENT IN AN ORGANIZED HEALTH CARE EDUCATION/TRAINING PROGRAM

## 2020-09-30 PROCEDURE — 99999 PR PBB SHADOW E&M-EST. PATIENT-LVL III: CPT | Mod: PBBFAC,,, | Performed by: STUDENT IN AN ORGANIZED HEALTH CARE EDUCATION/TRAINING PROGRAM

## 2020-09-30 PROCEDURE — 99214 OFFICE O/P EST MOD 30 MIN: CPT | Mod: S$PBB,,, | Performed by: STUDENT IN AN ORGANIZED HEALTH CARE EDUCATION/TRAINING PROGRAM

## 2020-09-30 PROCEDURE — 99213 OFFICE O/P EST LOW 20 MIN: CPT | Mod: PBBFAC | Performed by: STUDENT IN AN ORGANIZED HEALTH CARE EDUCATION/TRAINING PROGRAM

## 2020-09-30 NOTE — PROGRESS NOTES
ALLERGY & IMMUNOLOGY CLINIC - FOLLOW UP     HISTORY OF PRESENT ILLNESS     Patient ID: Mallory Abadie Bradbury is a 30 y.o. female    CC: f/u visit EoE    HPI: 30 year old female with Down Syndrome and previously diagnosed EoE presents for follow up. Initially presented following multiple food impactions and balloon dilations as well as failed improvement following one year of PPI. At time of last visit, had just started swallowed budesonide with little improvement so two food elimination diet was started. Per father, food elimination last approximately one week before slow return to normal diet. Additionally had an ER visit following back pain and possible episode of hematemesis which father was concerned was caused by budesonide. Changed to Budesonide every other day but reported that during no time did symptoms seem to be improving following budesonide therapy and brief time of food elimination. Had follow up EGD and balloon dilation 8/28 which father reports caused a moderate improvement in food impaction as well as dysphagia. Since that time, has been eating much easier than previously and does not require as much fluid to wash down food, though he does endorse Danae discontinued budesonide altogether in the previous 2 weeks. Does not have any other GI symptoms at this time and no reactions following any known food trigger.      REVIEW OF SYSTEMS     CONST: no F/C/NS  NEURO: no H/A, no weakness, no paresthesias  EYES: no discharge, no pruritus, no erythema  EARS: no hearing loss, no sensation of fullness  NOSE: no congestion, no rhinorrhea, no discharge, no itching, no sneezing  PULM: no SOB, no wheezing, no cough, no snoring  CV: no CP, no palpitations, no leg swelling  GI: no dysphagia, no heartburn, no pain, no N/V/D  MSK: no joint pain, no muscle pain  DERM: no rashes, no skin breaks     MEDICAL HISTORY     MedHx: active problems reviewed  SurgHx:   Past Surgical History:   Procedure Laterality Date     "APPENDECTOMY      COLONOSCOPY N/A 8/28/2020    Procedure: COLONOSCOPY;  Surgeon: Domingo Durand MD;  [facility]  Service: Endoscopy;  Laterality: N/A;  Please schedule in the 2nd floor for airway protection body habitus down syndrome     COVID test at [facility] on 8/25/20-GT    ESOPHAGOGASTRODUODENOSCOPY N/A 1/27/2020    Procedure: EGD (ESOPHAGOGASTRODUODENOSCOPY);  Surgeon: Violette Garrett MD;  Location[facility];  Service: Endoscopy;  Laterality: N/A;    ESOPHAGOGASTRODUODENOSCOPY N/A 7/14/2020    Procedure: EGD (ESOPHAGOGASTRODUODENOSCOPY);  Surgeon: Domingo Durand MD;  [facility]  Service: Endoscopy;  Laterality: N/A;  Urgent EGD 2nd floor for airway protection due to difficult intubation airway anatomy for esophageal dilation.  Please schedule her next week 2nd floor for dysphagia.     COVID test at [facility] on 7/11/20-GT    ESOPHAGOGASTRODUODENOSCOPY N/A 8/28/2020    Procedure: EGD (ESOPHAGOGASTRODUODENOSCOPY);  Surgeon: Domingo Durand MD;  Location: [facility] ([address] [place] [address]);  Service: Endoscopy;  Laterality: N/A;  per Dr Kiser-add EGD to colonoscopy order  8/26-pt's mother confirmed appt-MS    esposgus      TONGUE SURGERY      TONSILLECTOMY         SocHx: Lives at home with mother and father  FamHx:   Family History   Problem Relation Age of Onset    Colon cancer Paternal Grandfather 60    Esophageal cancer Neg Hx     Celiac disease Neg Hx     Cirrhosis Neg Hx     Colon polyps Neg Hx      Allergies: see below  Medications: MAR reviewed  Vaccines: UTD    Allergies: see below     Medications: MAR reviewed     Vaccines: UTD     H/o Asthma: denies  H/o Eczema: denies  H/o Rhinitis: denies  Oral Allergy:  denies  Food Allergy: denies  Venom Allergy: denies  Latex Allergy: denies  Other Allergies: denies  Env/Occ: denies any evironmental or occupational exposures     PHYSICAL EXAM     VS: Ht 4' 8" (1.422 m)   Wt 93.3 kg (205 lb 11 oz)   BMI 46.11 " kg/m²   GENERAL: well-appearing, cooperative  EYES: no conjunctival injection, no discharge, no infraorbital shiners  EARS: external auditory canals normal B/L, TM normal B/L  NOSE: NT 2+ and B/L, no stringing mucous, no polyps  ORAL: MMM, no ulcers, no thrush, no cobblestoning  LUNGS: CTAB, no w/r/c, no increased WOB  HEART: RRR, normal S1/S2, no m/g/r  ABDOMEN: BS present, soft, non-tender, non-distended, no HSM  EXTREMITIES: +2 distal pulses, no c/c/e  DERM: no rashes, no skin breaks, no dystrophic fingernails       IMAGING & OTHER DIAGNOSTICS   8/28/2020  1.  Distal esophagus, biopsy:       -  Squamous mucosa with changes of gastroesophageal reflux disease   2.  Proximal esophagus, biopsy:       -  Squamous mucosa with no significant pathologic findings   Comment:  The above esophageal biopsies are negative for evidence of   eosinophilic esophagitis.  Evidence of Mead's esophagus is not identified.    No viral inclusions are present. There is no evidence of dysplasia or   malignancy.   3.  Rectosigmoid colon, biopsy:       -  Colonic mucosa with melanosis coli, otherwise unremarkable   Comment:  Features of neither collagenous or lymphocytic colitis are seen. No   evidence of active colitis or chronic primary inflammatory bowel disease is   identified. No granulomas, parasites or viral inclusion are present. There is   no evidence of dysplasia or malignancy.       7/14/2020  1.  Duodenum (biopsy):   - Benign duodenum mucosa   - Negative for active inflammation   - No features of sprue   - No organisms   2. Stomach (biopsy):   - Benign antrum and body mucosa   - Mild chronic antral gastritis   - Negative for active inflammation   - Negative for Helicobacter pylori organisms on H&E stain   - Helicobacter pylori stain is negative.  The controls reacted appropriately.    3.  Distal esophagus (biopsy):   - Eosinophilic esophagitis   - Eosinophils are present up to 74 in a high power field   4.  Proximal esophagus  (biopsy):   - Esophagus squamous mucosa with mild esophagitis, lymphocytic predominant   - Eosinophils are present, up to 1 in a high power fields      CHART REVIEW     Previous GI notes and PCP notes, Pathology     ASSESSMENT & PLAN     Mallory Abadie Bradbury is a 30 y.o. female with     Eosinophilic esophagitis due to food- While initial EGD with biopsy did not demonstrate proximal esophageal eosinophils, given her history of multiple balloon dilations and failure to improve with PPIs, suspected that there was a degree of EoE as well as acid reflux. While there is a report that symptoms have improved since balloon dilation, this is likely not a viable long term option given her risks with anesthesia as well as multiple dilations. Would recommend she continue maintenance therapy with swallowed budesonide and can resume her normal diet. Food elimination may not be a viable option given her limited diet and risk for nutritional deficiency.  -Resume swallowed Budesonide daily  -Stop food elimination at this time, if symptoms seem to worsen again causing dysphagia, would recommend elimination both Cow's milk and Wheat from diet. If that is too restrictive, would recommend elimination cow's milk as a better option given its higher degree of association with EoE  -Follow up with GI for any future scopes and management        Follow up: Following next GI appointment      Joel Espinosa MD  Allergy/Immunology Fellow

## 2020-10-06 ENCOUNTER — LAB VISIT (OUTPATIENT)
Dept: LAB | Facility: HOSPITAL | Age: 30
End: 2020-10-06
Attending: INTERNAL MEDICINE
Payer: MEDICAID

## 2020-10-06 DIAGNOSIS — I51.9 MYXEDEMA HEART DISEASE: Primary | ICD-10-CM

## 2020-10-06 DIAGNOSIS — E03.9 MYXEDEMA HEART DISEASE: Primary | ICD-10-CM

## 2020-10-06 LAB — TSH SERPL DL<=0.005 MIU/L-ACNC: 0.51 UIU/ML (ref 0.4–4)

## 2020-10-06 PROCEDURE — 36415 COLL VENOUS BLD VENIPUNCTURE: CPT

## 2020-10-06 PROCEDURE — 84443 ASSAY THYROID STIM HORMONE: CPT

## 2021-01-30 ENCOUNTER — PATIENT MESSAGE (OUTPATIENT)
Dept: GASTROENTEROLOGY | Facility: CLINIC | Age: 31
End: 2021-01-30

## 2021-01-31 DIAGNOSIS — T88.4XXD DIFFICULT AIRWAY FOR INTUBATION, SUBSEQUENT ENCOUNTER: ICD-10-CM

## 2021-01-31 DIAGNOSIS — Q90.9 DOWN SYNDROME: ICD-10-CM

## 2021-01-31 DIAGNOSIS — R13.19 ESOPHAGEAL DYSPHAGIA: Primary | ICD-10-CM

## 2021-02-01 ENCOUNTER — PATIENT MESSAGE (OUTPATIENT)
Dept: ENDOSCOPY | Facility: HOSPITAL | Age: 31
End: 2021-02-01

## 2021-02-01 DIAGNOSIS — E53.8 VITAMIN B12 DEFICIENCY: ICD-10-CM

## 2021-02-01 DIAGNOSIS — E61.1 IRON DEFICIENCY: ICD-10-CM

## 2021-02-01 DIAGNOSIS — Z01.818 PRE-OP TESTING: Primary | ICD-10-CM

## 2021-02-01 DIAGNOSIS — E66.01 MORBID OBESITY WITH BMI OF 40.0-44.9, ADULT: ICD-10-CM

## 2021-02-01 DIAGNOSIS — E55.9 VITAMIN D DEFICIENCY: ICD-10-CM

## 2021-02-01 DIAGNOSIS — Q90.9 DOWN SYNDROME: ICD-10-CM

## 2021-02-01 DIAGNOSIS — E53.8 FOLATE DEFICIENCY: ICD-10-CM

## 2021-02-01 RX ORDER — FERROUS SULFATE 325(65) MG
325 TABLET ORAL EVERY 12 HOURS
Qty: 60 TABLET | Refills: 4 | Status: ON HOLD | OUTPATIENT
Start: 2021-02-01 | End: 2022-04-15 | Stop reason: HOSPADM

## 2021-02-02 ENCOUNTER — PATIENT MESSAGE (OUTPATIENT)
Dept: ENDOSCOPY | Facility: HOSPITAL | Age: 31
End: 2021-02-02

## 2021-02-06 ENCOUNTER — LAB VISIT (OUTPATIENT)
Dept: PRIMARY CARE CLINIC | Facility: CLINIC | Age: 31
End: 2021-02-06
Payer: MEDICAID

## 2021-02-06 DIAGNOSIS — Z01.818 PRE-OP TESTING: ICD-10-CM

## 2021-02-06 PROCEDURE — U0003 INFECTIOUS AGENT DETECTION BY NUCLEIC ACID (DNA OR RNA); SEVERE ACUTE RESPIRATORY SYNDROME CORONAVIRUS 2 (SARS-COV-2) (CORONAVIRUS DISEASE [COVID-19]), AMPLIFIED PROBE TECHNIQUE, MAKING USE OF HIGH THROUGHPUT TECHNOLOGIES AS DESCRIBED BY CMS-2020-01-R: HCPCS

## 2021-02-07 LAB — SARS-COV-2 RNA RESP QL NAA+PROBE: NOT DETECTED

## 2021-02-09 ENCOUNTER — HOSPITAL ENCOUNTER (OUTPATIENT)
Facility: HOSPITAL | Age: 31
Discharge: HOME | End: 2021-02-09
Attending: INTERNAL MEDICINE | Admitting: INTERNAL MEDICINE
Payer: MEDICAID

## 2021-02-09 ENCOUNTER — ANESTHESIA (OUTPATIENT)
Dept: ENDOSCOPY | Facility: HOSPITAL | Age: 31
End: 2021-02-09
Payer: MEDICAID

## 2021-02-09 ENCOUNTER — ANESTHESIA EVENT (OUTPATIENT)
Dept: ENDOSCOPY | Facility: HOSPITAL | Age: 31
End: 2021-02-09
Payer: MEDICAID

## 2021-02-09 VITALS
RESPIRATION RATE: 12 BRPM | WEIGHT: 202 LBS | SYSTOLIC BLOOD PRESSURE: 98 MMHG | HEART RATE: 76 BPM | HEIGHT: 56 IN | DIASTOLIC BLOOD PRESSURE: 65 MMHG | OXYGEN SATURATION: 100 % | TEMPERATURE: 98 F | BODY MASS INDEX: 45.44 KG/M2

## 2021-02-09 DIAGNOSIS — R13.10 DYSPHAGIA: ICD-10-CM

## 2021-02-09 LAB
B-HCG UR QL: NEGATIVE
CTP QC/QA: YES

## 2021-02-09 PROCEDURE — D9220A PRA ANESTHESIA: ICD-10-PCS | Mod: ANES,,, | Performed by: ANESTHESIOLOGY

## 2021-02-09 PROCEDURE — 25000003 PHARM REV CODE 250: Performed by: NURSE ANESTHETIST, CERTIFIED REGISTERED

## 2021-02-09 PROCEDURE — 00813 ANES UPR LWR GI NDSC PX: CPT | Performed by: INTERNAL MEDICINE

## 2021-02-09 PROCEDURE — 88305 TISSUE EXAM BY PATHOLOGIST: CPT | Mod: 26,,, | Performed by: PATHOLOGY

## 2021-02-09 PROCEDURE — 81025 URINE PREGNANCY TEST: CPT | Performed by: INTERNAL MEDICINE

## 2021-02-09 PROCEDURE — C1726 CATH, BAL DIL, NON-VASCULAR: HCPCS | Performed by: INTERNAL MEDICINE

## 2021-02-09 PROCEDURE — D9220A PRA ANESTHESIA: Mod: ANES,,, | Performed by: ANESTHESIOLOGY

## 2021-02-09 PROCEDURE — D9220A PRA ANESTHESIA: ICD-10-PCS | Mod: CRNA,,, | Performed by: NURSE ANESTHETIST, CERTIFIED REGISTERED

## 2021-02-09 PROCEDURE — 25000003 PHARM REV CODE 250: Performed by: INTERNAL MEDICINE

## 2021-02-09 PROCEDURE — 43239 EGD BIOPSY SINGLE/MULTIPLE: CPT

## 2021-02-09 PROCEDURE — 63600175 PHARM REV CODE 636 W HCPCS: Performed by: NURSE ANESTHETIST, CERTIFIED REGISTERED

## 2021-02-09 PROCEDURE — 88305 TISSUE EXAM BY PATHOLOGIST: CPT | Performed by: PATHOLOGY

## 2021-02-09 PROCEDURE — 27201012 HC FORCEPS, HOT/COLD, DISP: Performed by: INTERNAL MEDICINE

## 2021-02-09 PROCEDURE — 37000008 HC ANESTHESIA 1ST 15 MINUTES: Performed by: INTERNAL MEDICINE

## 2021-02-09 PROCEDURE — 43249 PR EGD, FLEX, W/BALL DILATION, < 30MM: ICD-10-PCS | Mod: ,,, | Performed by: INTERNAL MEDICINE

## 2021-02-09 PROCEDURE — 88305 TISSUE EXAM BY PATHOLOGIST: ICD-10-PCS | Mod: 26,,, | Performed by: PATHOLOGY

## 2021-02-09 PROCEDURE — D9220A PRA ANESTHESIA: Mod: CRNA,,, | Performed by: NURSE ANESTHETIST, CERTIFIED REGISTERED

## 2021-02-09 PROCEDURE — 43249 ESOPH EGD DILATION <30 MM: CPT | Performed by: INTERNAL MEDICINE

## 2021-02-09 PROCEDURE — 43249 ESOPH EGD DILATION <30 MM: CPT | Mod: ,,, | Performed by: INTERNAL MEDICINE

## 2021-02-09 PROCEDURE — 37000009 HC ANESTHESIA EA ADD 15 MINS: Performed by: INTERNAL MEDICINE

## 2021-02-09 RX ORDER — SODIUM CHLORIDE 9 MG/ML
INJECTION, SOLUTION INTRAVENOUS CONTINUOUS
Status: DISCONTINUED | OUTPATIENT
Start: 2021-02-09 | End: 2021-02-09 | Stop reason: HOSPADM

## 2021-02-09 RX ORDER — SUCCINYLCHOLINE CHLORIDE 20 MG/ML
INJECTION INTRAMUSCULAR; INTRAVENOUS
Status: DISCONTINUED | OUTPATIENT
Start: 2021-02-09 | End: 2021-02-09

## 2021-02-09 RX ORDER — ONDANSETRON 2 MG/ML
INJECTION INTRAMUSCULAR; INTRAVENOUS
Status: DISCONTINUED | OUTPATIENT
Start: 2021-02-09 | End: 2021-02-09

## 2021-02-09 RX ORDER — FENTANYL CITRATE 50 UG/ML
INJECTION, SOLUTION INTRAMUSCULAR; INTRAVENOUS
Status: DISCONTINUED | OUTPATIENT
Start: 2021-02-09 | End: 2021-02-09

## 2021-02-09 RX ORDER — PROPOFOL 10 MG/ML
VIAL (ML) INTRAVENOUS
Status: DISCONTINUED | OUTPATIENT
Start: 2021-02-09 | End: 2021-02-09

## 2021-02-09 RX ORDER — ROCURONIUM BROMIDE 10 MG/ML
INJECTION, SOLUTION INTRAVENOUS
Status: DISCONTINUED | OUTPATIENT
Start: 2021-02-09 | End: 2021-02-09

## 2021-02-09 RX ORDER — LIDOCAINE HYDROCHLORIDE 20 MG/ML
INJECTION, SOLUTION EPIDURAL; INFILTRATION; INTRACAUDAL; PERINEURAL
Status: DISCONTINUED | OUTPATIENT
Start: 2021-02-09 | End: 2021-02-09

## 2021-02-09 RX ORDER — DEXAMETHASONE SODIUM PHOSPHATE 4 MG/ML
INJECTION, SOLUTION INTRA-ARTICULAR; INTRALESIONAL; INTRAMUSCULAR; INTRAVENOUS; SOFT TISSUE
Status: DISCONTINUED | OUTPATIENT
Start: 2021-02-09 | End: 2021-02-09

## 2021-02-09 RX ADMIN — GLYCOPYRROLATE 0.2 MG: 0.2 INJECTION, SOLUTION INTRAMUSCULAR; INTRAVITREAL at 10:02

## 2021-02-09 RX ADMIN — FENTANYL CITRATE 50 MCG: 50 INJECTION, SOLUTION INTRAMUSCULAR; INTRAVENOUS at 10:02

## 2021-02-09 RX ADMIN — SUCCINYLCHOLINE CHLORIDE 160 MG: 20 INJECTION, SOLUTION INTRAMUSCULAR; INTRAVENOUS; PARENTERAL at 10:02

## 2021-02-09 RX ADMIN — ONDANSETRON 4 MG: 2 INJECTION INTRAMUSCULAR; INTRAVENOUS at 10:02

## 2021-02-09 RX ADMIN — LIDOCAINE HYDROCHLORIDE 50 MG: 20 INJECTION, SOLUTION EPIDURAL; INFILTRATION; INTRACAUDAL at 10:02

## 2021-02-09 RX ADMIN — PROPOFOL 200 MG: 10 INJECTION, EMULSION INTRAVENOUS at 10:02

## 2021-02-09 RX ADMIN — SODIUM CHLORIDE: 0.9 INJECTION, SOLUTION INTRAVENOUS at 09:02

## 2021-02-09 RX ADMIN — DEXAMETHASONE SODIUM PHOSPHATE 4 MG: 4 INJECTION INTRA-ARTICULAR; INTRALESIONAL; INTRAMUSCULAR; INTRAVENOUS; SOFT TISSUE at 10:02

## 2021-02-09 RX ADMIN — ROCURONIUM BROMIDE 5 MG: 10 INJECTION, SOLUTION INTRAVENOUS at 10:02

## 2021-02-10 LAB
FINAL PATHOLOGIC DIAGNOSIS: NORMAL
GROSS: NORMAL
Lab: NORMAL

## 2021-02-28 ENCOUNTER — OFFICE VISIT (OUTPATIENT)
Dept: URGENT CARE | Facility: CLINIC | Age: 31
End: 2021-02-28
Payer: MEDICAID

## 2021-02-28 VITALS
DIASTOLIC BLOOD PRESSURE: 65 MMHG | SYSTOLIC BLOOD PRESSURE: 97 MMHG | OXYGEN SATURATION: 98 % | TEMPERATURE: 97 F | HEART RATE: 65 BPM

## 2021-02-28 DIAGNOSIS — Z20.822 EXPOSURE TO COVID-19 VIRUS: Primary | ICD-10-CM

## 2021-02-28 LAB
CTP QC/QA: YES
SARS-COV-2 RDRP RESP QL NAA+PROBE: NEGATIVE

## 2021-02-28 PROCEDURE — 99213 PR OFFICE/OUTPT VISIT, EST, LEVL III, 20-29 MIN: ICD-10-PCS | Mod: S$GLB,CS,, | Performed by: NURSE PRACTITIONER

## 2021-02-28 PROCEDURE — 87635 SARS-COV-2 COVID-19 AMP PRB: CPT | Mod: QW,S$GLB,, | Performed by: NURSE PRACTITIONER

## 2021-02-28 PROCEDURE — 87635 PR SARS-COV-2 COVID-19 AMPLIFIED PROBE: ICD-10-PCS | Mod: QW,S$GLB,, | Performed by: NURSE PRACTITIONER

## 2021-02-28 PROCEDURE — 99213 OFFICE O/P EST LOW 20 MIN: CPT | Mod: S$GLB,CS,, | Performed by: NURSE PRACTITIONER

## 2021-04-09 ENCOUNTER — LAB VISIT (OUTPATIENT)
Dept: LAB | Facility: HOSPITAL | Age: 31
End: 2021-04-09
Attending: INTERNAL MEDICINE
Payer: MEDICAID

## 2021-04-09 DIAGNOSIS — E03.9 MYXEDEMA HEART DISEASE: Primary | ICD-10-CM

## 2021-04-09 DIAGNOSIS — I51.9 MYXEDEMA HEART DISEASE: Primary | ICD-10-CM

## 2021-04-09 LAB
T4 FREE SERPL-MCNC: 1.11 NG/DL (ref 0.71–1.51)
TSH SERPL DL<=0.005 MIU/L-ACNC: 1.17 UIU/ML (ref 0.4–4)

## 2021-04-09 PROCEDURE — 36415 COLL VENOUS BLD VENIPUNCTURE: CPT | Performed by: INTERNAL MEDICINE

## 2021-04-09 PROCEDURE — 84443 ASSAY THYROID STIM HORMONE: CPT | Performed by: INTERNAL MEDICINE

## 2021-04-09 PROCEDURE — 84439 ASSAY OF FREE THYROXINE: CPT | Performed by: INTERNAL MEDICINE

## 2021-04-26 DIAGNOSIS — R10.2 PELVIC PAIN: Primary | ICD-10-CM

## 2021-05-04 ENCOUNTER — HOSPITAL ENCOUNTER (OUTPATIENT)
Dept: RADIOLOGY | Facility: HOSPITAL | Age: 31
Discharge: HOME | End: 2021-05-04
Attending: OBSTETRICS & GYNECOLOGY
Payer: MEDICAID

## 2021-05-04 DIAGNOSIS — R10.2 PERINEAL NEURALGIA: ICD-10-CM

## 2021-05-04 DIAGNOSIS — R10.9 STOMACH ACHE: ICD-10-CM

## 2021-05-04 DIAGNOSIS — Q90.9 DOWN'S SYNDROME: ICD-10-CM

## 2021-05-04 DIAGNOSIS — R10.9 STOMACH ACHE: Primary | ICD-10-CM

## 2021-05-04 PROCEDURE — 25500020 PHARM REV CODE 255: Mod: PO | Performed by: OBSTETRICS & GYNECOLOGY

## 2021-05-04 PROCEDURE — 74177 CT ABD & PELVIS W/CONTRAST: CPT | Mod: TC,PO

## 2021-05-04 RX ADMIN — IOHEXOL 100 ML: 350 INJECTION, SOLUTION INTRAVENOUS at 10:05

## 2021-06-03 ENCOUNTER — PATIENT MESSAGE (OUTPATIENT)
Dept: GASTROENTEROLOGY | Facility: CLINIC | Age: 31
End: 2021-06-03

## 2021-06-03 DIAGNOSIS — K22.2 SCHATZKI'S RING: ICD-10-CM

## 2021-06-03 DIAGNOSIS — R13.19 ESOPHAGEAL DYSPHAGIA: Primary | ICD-10-CM

## 2021-06-04 ENCOUNTER — TELEPHONE (OUTPATIENT)
Dept: ENDOSCOPY | Facility: HOSPITAL | Age: 31
End: 2021-06-04

## 2021-06-04 ENCOUNTER — PATIENT MESSAGE (OUTPATIENT)
Dept: ENDOSCOPY | Facility: HOSPITAL | Age: 31
End: 2021-06-04

## 2021-06-29 ENCOUNTER — ANESTHESIA EVENT (OUTPATIENT)
Dept: ENDOSCOPY | Facility: HOSPITAL | Age: 31
End: 2021-06-29
Payer: MEDICAID

## 2021-06-29 ENCOUNTER — HOSPITAL ENCOUNTER (OUTPATIENT)
Facility: HOSPITAL | Age: 31
Discharge: HOME | End: 2021-06-29
Attending: INTERNAL MEDICINE | Admitting: INTERNAL MEDICINE
Payer: MEDICAID

## 2021-06-29 ENCOUNTER — ANESTHESIA (OUTPATIENT)
Dept: ENDOSCOPY | Facility: HOSPITAL | Age: 31
End: 2021-06-29
Payer: MEDICAID

## 2021-06-29 VITALS
HEIGHT: 56 IN | OXYGEN SATURATION: 100 % | WEIGHT: 208 LBS | SYSTOLIC BLOOD PRESSURE: 100 MMHG | RESPIRATION RATE: 12 BRPM | BODY MASS INDEX: 46.79 KG/M2 | DIASTOLIC BLOOD PRESSURE: 57 MMHG | TEMPERATURE: 98 F | HEART RATE: 54 BPM

## 2021-06-29 DIAGNOSIS — R13.19 ESOPHAGEAL DYSPHAGIA: Primary | ICD-10-CM

## 2021-06-29 PROCEDURE — 25000003 PHARM REV CODE 250: Performed by: INTERNAL MEDICINE

## 2021-06-29 PROCEDURE — 37000008 HC ANESTHESIA 1ST 15 MINUTES: Performed by: INTERNAL MEDICINE

## 2021-06-29 PROCEDURE — D9220A PRA ANESTHESIA: ICD-10-PCS | Mod: ANES,,, | Performed by: ANESTHESIOLOGY

## 2021-06-29 PROCEDURE — 63600175 PHARM REV CODE 636 W HCPCS: Performed by: NURSE ANESTHETIST, CERTIFIED REGISTERED

## 2021-06-29 PROCEDURE — D9220A PRA ANESTHESIA: Mod: CRNA,,, | Performed by: NURSE ANESTHETIST, CERTIFIED REGISTERED

## 2021-06-29 PROCEDURE — 00731 ANES UPR GI NDSC PX NOS: CPT | Performed by: INTERNAL MEDICINE

## 2021-06-29 PROCEDURE — 43248 EGD GUIDE WIRE INSERTION: CPT | Performed by: INTERNAL MEDICINE

## 2021-06-29 PROCEDURE — 43248 EGD GUIDE WIRE INSERTION: CPT | Mod: ,,, | Performed by: INTERNAL MEDICINE

## 2021-06-29 PROCEDURE — C1769 GUIDE WIRE: HCPCS | Performed by: INTERNAL MEDICINE

## 2021-06-29 PROCEDURE — 37000009 HC ANESTHESIA EA ADD 15 MINS: Performed by: INTERNAL MEDICINE

## 2021-06-29 PROCEDURE — D9220A PRA ANESTHESIA: Mod: ANES,,, | Performed by: ANESTHESIOLOGY

## 2021-06-29 PROCEDURE — D9220A PRA ANESTHESIA: ICD-10-PCS | Mod: CRNA,,, | Performed by: NURSE ANESTHETIST, CERTIFIED REGISTERED

## 2021-06-29 PROCEDURE — 25000003 PHARM REV CODE 250: Performed by: NURSE ANESTHETIST, CERTIFIED REGISTERED

## 2021-06-29 PROCEDURE — 43248 PR EGD, FLEX, W/DILATION OVER GUIDEWIRE: ICD-10-PCS | Mod: ,,, | Performed by: INTERNAL MEDICINE

## 2021-06-29 RX ORDER — SODIUM CHLORIDE 0.9 % (FLUSH) 0.9 %
10 SYRINGE (ML) INJECTION
Status: DISCONTINUED | OUTPATIENT
Start: 2021-06-29 | End: 2021-06-29 | Stop reason: HOSPADM

## 2021-06-29 RX ORDER — PROPOFOL 10 MG/ML
VIAL (ML) INTRAVENOUS CONTINUOUS PRN
Status: DISCONTINUED | OUTPATIENT
Start: 2021-06-29 | End: 2021-06-29

## 2021-06-29 RX ORDER — LIDOCAINE HYDROCHLORIDE 20 MG/ML
INJECTION, SOLUTION EPIDURAL; INFILTRATION; INTRACAUDAL; PERINEURAL
Status: DISCONTINUED | OUTPATIENT
Start: 2021-06-29 | End: 2021-06-29

## 2021-06-29 RX ORDER — PROPOFOL 10 MG/ML
VIAL (ML) INTRAVENOUS
Status: DISCONTINUED | OUTPATIENT
Start: 2021-06-29 | End: 2021-06-29

## 2021-06-29 RX ORDER — SODIUM CHLORIDE 9 MG/ML
INJECTION, SOLUTION INTRAVENOUS CONTINUOUS
Status: DISCONTINUED | OUTPATIENT
Start: 2021-06-29 | End: 2021-06-29 | Stop reason: HOSPADM

## 2021-06-29 RX ADMIN — SODIUM CHLORIDE: 0.9 INJECTION, SOLUTION INTRAVENOUS at 09:06

## 2021-06-29 RX ADMIN — LIDOCAINE HYDROCHLORIDE 50 MG: 20 INJECTION, SOLUTION EPIDURAL; INFILTRATION; INTRACAUDAL at 09:06

## 2021-06-29 RX ADMIN — PROPOFOL 70 MG: 10 INJECTION, EMULSION INTRAVENOUS at 09:06

## 2021-06-29 RX ADMIN — Medication 300 MCG/KG/MIN: at 09:06

## 2021-08-08 DIAGNOSIS — E53.8 LOW FOLATE: Primary | ICD-10-CM

## 2021-10-01 ENCOUNTER — IMMUNIZATION (OUTPATIENT)
Dept: PRIMARY CARE CLINIC | Facility: CLINIC | Age: 31
End: 2021-10-01
Payer: MEDICAID

## 2021-10-01 DIAGNOSIS — Z23 NEED FOR VACCINATION: Primary | ICD-10-CM

## 2021-10-01 PROCEDURE — 91300 COVID-19, MRNA, LNP-S, PF, 30 MCG/0.3 ML DOSE VACCINE: CPT | Mod: PBBFAC,PN

## 2021-11-09 ENCOUNTER — LAB VISIT (OUTPATIENT)
Dept: LAB | Facility: HOSPITAL | Age: 31
End: 2021-11-09
Attending: INTERNAL MEDICINE
Payer: MEDICAID

## 2021-11-09 DIAGNOSIS — E03.9 MYXEDEMA HEART DISEASE: Primary | ICD-10-CM

## 2021-11-09 DIAGNOSIS — I51.9 MYXEDEMA HEART DISEASE: Primary | ICD-10-CM

## 2021-11-09 LAB
T4 FREE SERPL-MCNC: 1.04 NG/DL (ref 0.71–1.51)
TSH SERPL DL<=0.005 MIU/L-ACNC: 2.42 UIU/ML (ref 0.4–4)

## 2021-11-09 PROCEDURE — 36415 COLL VENOUS BLD VENIPUNCTURE: CPT | Performed by: INTERNAL MEDICINE

## 2021-11-09 PROCEDURE — 84439 ASSAY OF FREE THYROXINE: CPT | Performed by: INTERNAL MEDICINE

## 2021-11-09 PROCEDURE — 84443 ASSAY THYROID STIM HORMONE: CPT | Performed by: INTERNAL MEDICINE

## 2022-04-05 ENCOUNTER — PATIENT MESSAGE (OUTPATIENT)
Dept: GASTROENTEROLOGY | Facility: CLINIC | Age: 32
End: 2022-04-05
Payer: MEDICAID

## 2022-04-05 DIAGNOSIS — R13.19 ESOPHAGEAL DYSPHAGIA: Primary | ICD-10-CM

## 2022-04-06 ENCOUNTER — PATIENT MESSAGE (OUTPATIENT)
Dept: ENDOSCOPY | Facility: HOSPITAL | Age: 32
End: 2022-04-06
Payer: MEDICAID

## 2022-04-12 ENCOUNTER — ANESTHESIA EVENT (OUTPATIENT)
Dept: ENDOSCOPY | Facility: HOSPITAL | Age: 32
End: 2022-04-12
Payer: MEDICAID

## 2022-04-13 ENCOUNTER — ANESTHESIA (OUTPATIENT)
Dept: ENDOSCOPY | Facility: HOSPITAL | Age: 32
End: 2022-04-13
Payer: MEDICAID

## 2022-04-13 ENCOUNTER — TELEPHONE (OUTPATIENT)
Dept: ENDOSCOPY | Facility: HOSPITAL | Age: 32
End: 2022-04-13
Payer: MEDICAID

## 2022-04-13 ENCOUNTER — HOSPITAL ENCOUNTER (OUTPATIENT)
Facility: HOSPITAL | Age: 32
Discharge: HOME | End: 2022-04-15
Attending: EMERGENCY MEDICINE | Admitting: HOSPITALIST
Payer: MEDICAID

## 2022-04-13 DIAGNOSIS — Z51.81 ENCOUNTER FOR MONITORING LONG-TERM PROTON PUMP INHIBITOR THERAPY: ICD-10-CM

## 2022-04-13 DIAGNOSIS — J69.0 ASPIRATION PNEUMONIA OF LEFT LOWER LOBE DUE TO GASTRIC SECRETIONS: Primary | ICD-10-CM

## 2022-04-13 DIAGNOSIS — R07.9 CHEST PAIN: ICD-10-CM

## 2022-04-13 DIAGNOSIS — K21.9 GASTROESOPHAGEAL REFLUX DISEASE, UNSPECIFIED WHETHER ESOPHAGITIS PRESENT: Primary | ICD-10-CM

## 2022-04-13 DIAGNOSIS — K92.0 COFFEE GROUND EMESIS: ICD-10-CM

## 2022-04-13 DIAGNOSIS — R50.9 FEVER, UNSPECIFIED FEVER CAUSE: ICD-10-CM

## 2022-04-13 DIAGNOSIS — Z79.899 ENCOUNTER FOR MONITORING LONG-TERM PROTON PUMP INHIBITOR THERAPY: ICD-10-CM

## 2022-04-13 DIAGNOSIS — Z87.19 STATUS POST DILATION OF ESOPHAGEAL NARROWING: ICD-10-CM

## 2022-04-13 DIAGNOSIS — K22.10 EROSIVE ESOPHAGITIS: ICD-10-CM

## 2022-04-13 DIAGNOSIS — Z98.890 STATUS POST DILATION OF ESOPHAGEAL NARROWING: ICD-10-CM

## 2022-04-13 DIAGNOSIS — R00.0 TACHYCARDIA: ICD-10-CM

## 2022-04-13 PROBLEM — F32.A DEPRESSIVE DISORDER: Status: ACTIVE | Noted: 2020-07-28

## 2022-04-13 PROBLEM — K44.9 HIATAL HERNIA: Status: ACTIVE | Noted: 2020-07-28

## 2022-04-13 PROBLEM — E53.8 VITAMIN B12 DEFICIENCY (NON ANEMIC): Status: ACTIVE | Noted: 2020-07-22

## 2022-04-13 PROBLEM — Q90.9 COMPLETE TRISOMY 21 SYNDROME: Status: ACTIVE | Noted: 2020-07-28

## 2022-04-13 PROBLEM — E07.9 DISEASE OF THYROID GLAND: Status: ACTIVE | Noted: 2022-04-13

## 2022-04-13 PROBLEM — K22.2 STRICTURE OF ESOPHAGUS: Status: ACTIVE | Noted: 2020-07-28

## 2022-04-13 LAB
ALBUMIN SERPL BCP-MCNC: 3.4 G/DL (ref 3.5–5.2)
ALP SERPL-CCNC: 83 U/L (ref 55–135)
ALT SERPL W/O P-5'-P-CCNC: 16 U/L (ref 10–44)
ANION GAP SERPL CALC-SCNC: 11 MMOL/L (ref 8–16)
ANISOCYTOSIS BLD QL SMEAR: SLIGHT
AST SERPL-CCNC: 16 U/L (ref 10–40)
BACTERIA #/AREA URNS AUTO: ABNORMAL /HPF
BASOPHILS # BLD AUTO: 0.05 K/UL (ref 0–0.2)
BASOPHILS NFR BLD: 0.2 % (ref 0–1.9)
BILIRUB SERPL-MCNC: 1 MG/DL (ref 0.1–1)
BILIRUB UR QL STRIP: NEGATIVE
BUN SERPL-MCNC: 8 MG/DL (ref 6–20)
CALCIUM SERPL-MCNC: 9.1 MG/DL (ref 8.7–10.5)
CHLORIDE SERPL-SCNC: 108 MMOL/L (ref 95–110)
CLARITY UR REFRACT.AUTO: CLEAR
CO2 SERPL-SCNC: 20 MMOL/L (ref 23–29)
COLOR UR AUTO: ABNORMAL
CREAT SERPL-MCNC: 1 MG/DL (ref 0.5–1.4)
CTP QC/QA: YES
DIFFERENTIAL METHOD: ABNORMAL
EOSINOPHIL # BLD AUTO: 0 K/UL (ref 0–0.5)
EOSINOPHIL NFR BLD: 0 % (ref 0–8)
ERYTHROCYTE [DISTWIDTH] IN BLOOD BY AUTOMATED COUNT: 14.4 % (ref 11.5–14.5)
EST. GFR  (AFRICAN AMERICAN): >60 ML/MIN/1.73 M^2
EST. GFR  (NON AFRICAN AMERICAN): >60 ML/MIN/1.73 M^2
GLUCOSE SERPL-MCNC: 96 MG/DL (ref 70–110)
GLUCOSE UR QL STRIP: NEGATIVE
HCT VFR BLD AUTO: 47.2 % (ref 37–48.5)
HGB BLD-MCNC: 15.5 G/DL (ref 12–16)
HGB UR QL STRIP: ABNORMAL
IMM GRANULOCYTES # BLD AUTO: 0.13 K/UL (ref 0–0.04)
IMM GRANULOCYTES NFR BLD AUTO: 0.6 % (ref 0–0.5)
INFLUENZA A, MOLECULAR: NEGATIVE
INFLUENZA B, MOLECULAR: NEGATIVE
KETONES UR QL STRIP: ABNORMAL
LACTATE SERPL-SCNC: 1.3 MMOL/L (ref 0.5–2.2)
LEUKOCYTE ESTERASE UR QL STRIP: NEGATIVE
LIPASE SERPL-CCNC: 10 U/L (ref 4–60)
LYMPHOCYTES # BLD AUTO: 1.2 K/UL (ref 1–4.8)
LYMPHOCYTES NFR BLD: 4.9 % (ref 18–48)
MCH RBC QN AUTO: 32 PG (ref 27–31)
MCHC RBC AUTO-ENTMCNC: 32.8 G/DL (ref 32–36)
MCV RBC AUTO: 98 FL (ref 82–98)
MICROSCOPIC COMMENT: ABNORMAL
MONOCYTES # BLD AUTO: 1.2 K/UL (ref 0.3–1)
MONOCYTES NFR BLD: 4.9 % (ref 4–15)
NEUTROPHILS # BLD AUTO: 21 K/UL (ref 1.8–7.7)
NEUTROPHILS NFR BLD: 89.4 % (ref 38–73)
NITRITE UR QL STRIP: NEGATIVE
NRBC BLD-RTO: 0 /100 WBC
PH UR STRIP: 5 [PH] (ref 5–8)
PLATELET # BLD AUTO: 229 K/UL (ref 150–450)
PLATELET BLD QL SMEAR: ABNORMAL
PMV BLD AUTO: 9.7 FL (ref 9.2–12.9)
POLYCHROMASIA BLD QL SMEAR: ABNORMAL
POTASSIUM SERPL-SCNC: 3.9 MMOL/L (ref 3.5–5.1)
PROCALCITONIN SERPL IA-MCNC: 1.72 NG/ML
PROT SERPL-MCNC: 6.5 G/DL (ref 6–8.4)
PROT UR QL STRIP: NEGATIVE
RBC # BLD AUTO: 4.84 M/UL (ref 4–5.4)
RBC #/AREA URNS AUTO: 11 /HPF (ref 0–4)
SARS-COV-2 RDRP RESP QL NAA+PROBE: NEGATIVE
SODIUM SERPL-SCNC: 139 MMOL/L (ref 136–145)
SP GR UR STRIP: 1.03 (ref 1–1.03)
SPECIMEN SOURCE: NORMAL
SQUAMOUS #/AREA URNS AUTO: 0 /HPF
URN SPEC COLLECT METH UR: ABNORMAL
WBC # BLD AUTO: 23.47 K/UL (ref 3.9–12.7)
WBC #/AREA URNS AUTO: 3 /HPF (ref 0–5)

## 2022-04-13 PROCEDURE — 85025 COMPLETE CBC W/AUTO DIFF WBC: CPT | Performed by: NURSE PRACTITIONER

## 2022-04-13 PROCEDURE — 83690 ASSAY OF LIPASE: CPT | Performed by: NURSE PRACTITIONER

## 2022-04-13 PROCEDURE — 99220 PR INITIAL OBSERVATION CARE,LEVL III: CPT | Mod: ,,, | Performed by: PHYSICIAN ASSISTANT

## 2022-04-13 PROCEDURE — D9220A PRA ANESTHESIA: ICD-10-PCS | Mod: ANES,,, | Performed by: STUDENT IN AN ORGANIZED HEALTH CARE EDUCATION/TRAINING PROGRAM

## 2022-04-13 PROCEDURE — 99285 PR EMERGENCY DEPT VISIT,LEVEL V: ICD-10-PCS | Mod: CS,,, | Performed by: NURSE PRACTITIONER

## 2022-04-13 PROCEDURE — 80053 COMPREHEN METABOLIC PANEL: CPT | Performed by: NURSE PRACTITIONER

## 2022-04-13 PROCEDURE — 63600175 PHARM REV CODE 636 W HCPCS: Performed by: NURSE PRACTITIONER

## 2022-04-13 PROCEDURE — 25000003 PHARM REV CODE 250: Performed by: NURSE ANESTHETIST, CERTIFIED REGISTERED

## 2022-04-13 PROCEDURE — 63600175 PHARM REV CODE 636 W HCPCS

## 2022-04-13 PROCEDURE — G0378 HOSPITAL OBSERVATION PER HR: HCPCS

## 2022-04-13 PROCEDURE — 83605 ASSAY OF LACTIC ACID: CPT | Performed by: NURSE PRACTITIONER

## 2022-04-13 PROCEDURE — 81001 URINALYSIS AUTO W/SCOPE: CPT | Performed by: NURSE PRACTITIONER

## 2022-04-13 PROCEDURE — 93010 EKG 12-LEAD: ICD-10-PCS | Mod: ,,, | Performed by: INTERNAL MEDICINE

## 2022-04-13 PROCEDURE — 96361 HYDRATE IV INFUSION ADD-ON: CPT

## 2022-04-13 PROCEDURE — 25500020 PHARM REV CODE 255: Performed by: EMERGENCY MEDICINE

## 2022-04-13 PROCEDURE — 63600175 PHARM REV CODE 636 W HCPCS: Performed by: EMERGENCY MEDICINE

## 2022-04-13 PROCEDURE — 25000003 PHARM REV CODE 250: Performed by: EMERGENCY MEDICINE

## 2022-04-13 PROCEDURE — D9220A PRA ANESTHESIA: Mod: CRNA,,, | Performed by: NURSE ANESTHETIST, CERTIFIED REGISTERED

## 2022-04-13 PROCEDURE — 93010 ELECTROCARDIOGRAM REPORT: CPT | Mod: ,,, | Performed by: INTERNAL MEDICINE

## 2022-04-13 PROCEDURE — 99285 EMERGENCY DEPT VISIT HI MDM: CPT | Mod: 25

## 2022-04-13 PROCEDURE — 63600175 PHARM REV CODE 636 W HCPCS: Performed by: NURSE ANESTHETIST, CERTIFIED REGISTERED

## 2022-04-13 PROCEDURE — 87040 BLOOD CULTURE FOR BACTERIA: CPT | Performed by: NURSE PRACTITIONER

## 2022-04-13 PROCEDURE — P9612 CATHETERIZE FOR URINE SPEC: HCPCS

## 2022-04-13 PROCEDURE — 84145 PROCALCITONIN (PCT): CPT | Performed by: NURSE PRACTITIONER

## 2022-04-13 PROCEDURE — 96375 TX/PRO/DX INJ NEW DRUG ADDON: CPT

## 2022-04-13 PROCEDURE — 93005 ELECTROCARDIOGRAM TRACING: CPT | Mod: 59

## 2022-04-13 PROCEDURE — 87502 INFLUENZA DNA AMP PROBE: CPT | Performed by: EMERGENCY MEDICINE

## 2022-04-13 PROCEDURE — 96365 THER/PROPH/DIAG IV INF INIT: CPT

## 2022-04-13 PROCEDURE — 25000003 PHARM REV CODE 250: Performed by: NURSE PRACTITIONER

## 2022-04-13 PROCEDURE — 96366 THER/PROPH/DIAG IV INF ADDON: CPT | Mod: 59

## 2022-04-13 PROCEDURE — 99285 EMERGENCY DEPT VISIT HI MDM: CPT | Mod: CS,,, | Performed by: NURSE PRACTITIONER

## 2022-04-13 PROCEDURE — D9220A PRA ANESTHESIA: ICD-10-PCS | Mod: CRNA,,, | Performed by: NURSE ANESTHETIST, CERTIFIED REGISTERED

## 2022-04-13 PROCEDURE — C9113 INJ PANTOPRAZOLE SODIUM, VIA: HCPCS | Performed by: EMERGENCY MEDICINE

## 2022-04-13 PROCEDURE — D9220A PRA ANESTHESIA: Mod: ANES,,, | Performed by: STUDENT IN AN ORGANIZED HEALTH CARE EDUCATION/TRAINING PROGRAM

## 2022-04-13 PROCEDURE — 99220 PR INITIAL OBSERVATION CARE,LEVL III: ICD-10-PCS | Mod: ,,, | Performed by: PHYSICIAN ASSISTANT

## 2022-04-13 PROCEDURE — U0002 COVID-19 LAB TEST NON-CDC: HCPCS | Performed by: NURSE PRACTITIONER

## 2022-04-13 RX ORDER — SODIUM CHLORIDE 0.9 % (FLUSH) 0.9 %
10 SYRINGE (ML) INJECTION EVERY 8 HOURS PRN
Status: DISCONTINUED | OUTPATIENT
Start: 2022-04-14 | End: 2022-04-15 | Stop reason: HOSPADM

## 2022-04-13 RX ORDER — METRONIDAZOLE 500 MG/1
500 TABLET ORAL
Status: COMPLETED | OUTPATIENT
Start: 2022-04-13 | End: 2022-04-13

## 2022-04-13 RX ORDER — PROCHLORPERAZINE EDISYLATE 5 MG/ML
5 INJECTION INTRAMUSCULAR; INTRAVENOUS EVERY 6 HOURS PRN
Status: DISCONTINUED | OUTPATIENT
Start: 2022-04-14 | End: 2022-04-15 | Stop reason: HOSPADM

## 2022-04-13 RX ORDER — TALC
6 POWDER (GRAM) TOPICAL NIGHTLY PRN
Status: DISCONTINUED | OUTPATIENT
Start: 2022-04-14 | End: 2022-04-15 | Stop reason: HOSPADM

## 2022-04-13 RX ORDER — GLUCAGON 1 MG
1 KIT INJECTION
Status: DISCONTINUED | OUTPATIENT
Start: 2022-04-14 | End: 2022-04-15 | Stop reason: HOSPADM

## 2022-04-13 RX ORDER — DEXMEDETOMIDINE HYDROCHLORIDE 100 UG/ML
INJECTION, SOLUTION INTRAVENOUS
Status: DISCONTINUED | OUTPATIENT
Start: 2022-04-13 | End: 2022-04-13

## 2022-04-13 RX ORDER — IBUPROFEN 200 MG
16 TABLET ORAL
Status: DISCONTINUED | OUTPATIENT
Start: 2022-04-14 | End: 2022-04-15 | Stop reason: HOSPADM

## 2022-04-13 RX ORDER — CIPROFLOXACIN 2 MG/ML
400 INJECTION, SOLUTION INTRAVENOUS
Status: DISCONTINUED | OUTPATIENT
Start: 2022-04-13 | End: 2022-04-13 | Stop reason: SDUPTHER

## 2022-04-13 RX ORDER — FLUCONAZOLE 150 MG/1
TABLET ORAL
Status: ON HOLD | COMMUNITY
End: 2022-04-15 | Stop reason: HOSPADM

## 2022-04-13 RX ORDER — IPRATROPIUM BROMIDE AND ALBUTEROL SULFATE 2.5; .5 MG/3ML; MG/3ML
3 SOLUTION RESPIRATORY (INHALATION) EVERY 4 HOURS PRN
Status: DISCONTINUED | OUTPATIENT
Start: 2022-04-14 | End: 2022-04-15 | Stop reason: HOSPADM

## 2022-04-13 RX ORDER — PROPOFOL 10 MG/ML
VIAL (ML) INTRAVENOUS
Status: DISCONTINUED | OUTPATIENT
Start: 2022-04-13 | End: 2022-04-13

## 2022-04-13 RX ORDER — PANTOPRAZOLE SODIUM 40 MG/10ML
40 INJECTION, POWDER, LYOPHILIZED, FOR SOLUTION INTRAVENOUS ONCE
Status: DISCONTINUED | OUTPATIENT
Start: 2022-04-14 | End: 2022-04-15 | Stop reason: HOSPADM

## 2022-04-13 RX ORDER — NALOXONE HCL 0.4 MG/ML
0.02 VIAL (ML) INJECTION
Status: DISCONTINUED | OUTPATIENT
Start: 2022-04-14 | End: 2022-04-15 | Stop reason: HOSPADM

## 2022-04-13 RX ORDER — PHENYLEPHRINE HYDROCHLORIDE 10 MG/ML
INJECTION INTRAVENOUS
Status: DISCONTINUED | OUTPATIENT
Start: 2022-04-13 | End: 2022-04-13

## 2022-04-13 RX ORDER — ACETAMINOPHEN 325 MG/1
650 TABLET ORAL EVERY 4 HOURS PRN
Status: DISCONTINUED | OUTPATIENT
Start: 2022-04-14 | End: 2022-04-15 | Stop reason: HOSPADM

## 2022-04-13 RX ORDER — MAG HYDROX/ALUMINUM HYD/SIMETH 200-200-20
30 SUSPENSION, ORAL (FINAL DOSE FORM) ORAL 4 TIMES DAILY PRN
Status: DISCONTINUED | OUTPATIENT
Start: 2022-04-14 | End: 2022-04-15 | Stop reason: HOSPADM

## 2022-04-13 RX ORDER — PANTOPRAZOLE SODIUM 40 MG/10ML
40 INJECTION, POWDER, LYOPHILIZED, FOR SOLUTION INTRAVENOUS
Status: COMPLETED | OUTPATIENT
Start: 2022-04-13 | End: 2022-04-13

## 2022-04-13 RX ORDER — PANTOPRAZOLE SODIUM 40 MG/1
40 TABLET, DELAYED RELEASE ORAL
Qty: 90 TABLET | Refills: 3 | Status: SHIPPED | OUTPATIENT
Start: 2022-04-13 | End: 2022-04-15

## 2022-04-13 RX ORDER — PANTOPRAZOLE SODIUM 40 MG/10ML
40 INJECTION, POWDER, LYOPHILIZED, FOR SOLUTION INTRAVENOUS 2 TIMES DAILY
Status: DISCONTINUED | OUTPATIENT
Start: 2022-04-13 | End: 2022-04-15 | Stop reason: HOSPADM

## 2022-04-13 RX ORDER — DOXYCYCLINE HYCLATE 50 MG/1
TABLET, FILM COATED ORAL
Status: ON HOLD | COMMUNITY
Start: 2022-04-11 | End: 2023-10-01 | Stop reason: HOSPADM

## 2022-04-13 RX ORDER — SIMETHICONE 80 MG
1 TABLET,CHEWABLE ORAL 4 TIMES DAILY PRN
Status: DISCONTINUED | OUTPATIENT
Start: 2022-04-14 | End: 2022-04-15 | Stop reason: HOSPADM

## 2022-04-13 RX ORDER — ONDANSETRON 2 MG/ML
INJECTION INTRAMUSCULAR; INTRAVENOUS
Status: COMPLETED
Start: 2022-04-13 | End: 2022-04-13

## 2022-04-13 RX ORDER — LIDOCAINE HYDROCHLORIDE 20 MG/ML
INJECTION INTRAVENOUS
Status: DISCONTINUED | OUTPATIENT
Start: 2022-04-13 | End: 2022-04-13

## 2022-04-13 RX ORDER — IBUPROFEN 200 MG
24 TABLET ORAL
Status: DISCONTINUED | OUTPATIENT
Start: 2022-04-14 | End: 2022-04-15 | Stop reason: HOSPADM

## 2022-04-13 RX ORDER — METRONIDAZOLE 500 MG/100ML
500 INJECTION, SOLUTION INTRAVENOUS
Status: DISCONTINUED | OUTPATIENT
Start: 2022-04-13 | End: 2022-04-13

## 2022-04-13 RX ORDER — METRONIDAZOLE 500 MG/100ML
500 INJECTION, SOLUTION INTRAVENOUS
Status: DISCONTINUED | OUTPATIENT
Start: 2022-04-14 | End: 2022-04-14

## 2022-04-13 RX ORDER — POLYETHYLENE GLYCOL 3350 17 G/17G
17 POWDER, FOR SOLUTION ORAL DAILY PRN
Status: DISCONTINUED | OUTPATIENT
Start: 2022-04-14 | End: 2022-04-15 | Stop reason: HOSPADM

## 2022-04-13 RX ORDER — ONDANSETRON 8 MG/1
8 TABLET, ORALLY DISINTEGRATING ORAL EVERY 8 HOURS PRN
Status: DISCONTINUED | OUTPATIENT
Start: 2022-04-14 | End: 2022-04-15 | Stop reason: HOSPADM

## 2022-04-13 RX ORDER — MINOCYCLINE HYDROCHLORIDE 100 MG/1
CAPSULE ORAL
Status: ON HOLD | COMMUNITY
End: 2022-04-15 | Stop reason: HOSPADM

## 2022-04-13 RX ORDER — DIPHENHYDRAMINE HYDROCHLORIDE 50 MG/ML
12.5 INJECTION INTRAMUSCULAR; INTRAVENOUS
Status: COMPLETED | OUTPATIENT
Start: 2022-04-13 | End: 2022-04-13

## 2022-04-13 RX ORDER — ONDANSETRON 2 MG/ML
4 INJECTION INTRAMUSCULAR; INTRAVENOUS
Status: COMPLETED | OUTPATIENT
Start: 2022-04-13 | End: 2022-04-13

## 2022-04-13 RX ADMIN — IOHEXOL 15 ML: 350 INJECTION, SOLUTION INTRAVENOUS at 09:04

## 2022-04-13 RX ADMIN — ONDANSETRON 4 MG: 2 INJECTION INTRAMUSCULAR; INTRAVENOUS at 10:04

## 2022-04-13 RX ADMIN — PROPOFOL 20 MG: 10 INJECTION, EMULSION INTRAVENOUS at 08:04

## 2022-04-13 RX ADMIN — PROPOFOL 50 MG: 10 INJECTION, EMULSION INTRAVENOUS at 08:04

## 2022-04-13 RX ADMIN — DIPHENHYDRAMINE HYDROCHLORIDE 12.5 MG: 50 INJECTION, SOLUTION INTRAMUSCULAR; INTRAVENOUS at 10:04

## 2022-04-13 RX ADMIN — DEXMEDETOMIDINE HYDROCHLORIDE 12 MCG: 100 INJECTION, SOLUTION, CONCENTRATE INTRAVENOUS at 08:04

## 2022-04-13 RX ADMIN — METRONIDAZOLE 500 MG: 500 TABLET ORAL at 10:04

## 2022-04-13 RX ADMIN — DEXMEDETOMIDINE HYDROCHLORIDE 4 MCG: 100 INJECTION, SOLUTION, CONCENTRATE INTRAVENOUS at 08:04

## 2022-04-13 RX ADMIN — PROPOFOL 30 MG: 10 INJECTION, EMULSION INTRAVENOUS at 08:04

## 2022-04-13 RX ADMIN — LIDOCAINE HYDROCHLORIDE 50 MG: 20 INJECTION, SOLUTION INTRAVENOUS at 08:04

## 2022-04-13 RX ADMIN — VANCOMYCIN HYDROCHLORIDE 2250 MG: 1.25 INJECTION, POWDER, LYOPHILIZED, FOR SOLUTION INTRAVENOUS at 08:04

## 2022-04-13 RX ADMIN — IOHEXOL 100 ML: 350 INJECTION, SOLUTION INTRAVENOUS at 09:04

## 2022-04-13 RX ADMIN — Medication 200 MCG/KG/MIN: at 08:04

## 2022-04-13 RX ADMIN — SODIUM CHLORIDE, SODIUM LACTATE, POTASSIUM CHLORIDE, AND CALCIUM CHLORIDE 1000 ML: .6; .31; .03; .02 INJECTION, SOLUTION INTRAVENOUS at 07:04

## 2022-04-13 RX ADMIN — GLYCOPYRROLATE 0.2 MG: 0.2 INJECTION, SOLUTION INTRAMUSCULAR; INTRAVITREAL at 08:04

## 2022-04-13 RX ADMIN — PHENYLEPHRINE HYDROCHLORIDE 100 MCG: 10 INJECTION INTRAVENOUS at 08:04

## 2022-04-13 RX ADMIN — PANTOPRAZOLE SODIUM 40 MG: 40 INJECTION, POWDER, FOR SOLUTION INTRAVENOUS at 10:04

## 2022-04-13 NOTE — ED PROVIDER NOTES
Encounter Date: 4/13/2022       History     Chief Complaint   Patient presents with    Fever     Mom reports pt had esophageal dilatation this am-states she is running fever 101.2 and c/o stomach pain now.  Pt had Tylenol at 1330     30 y/o female with Down's syndrome, GERD, hiatal hernia and thyroid disease which presents to the ED with fever and epigastric pain following EGD with dilatation today. Dad states he talked to someone and they advised that they come to the ED for further evaluation for possible perforation. Dad states he gave her tylenol prior to arrival.     The history is provided by the patient.     Review of patient's allergies indicates:   Allergen Reactions    Amoxicillin Hives    Augmentin [amoxicillin-pot clavulanate] Rash    Bactrim [sulfamethoxazole-trimethoprim] Rash    Duricef [cefadroxil] Rash    Keflex [cephalexin] Rash    Rifampin Rash     Past Medical History:   Diagnosis Date    Difficult intubation     per dad     Down's syndrome     Esophageal stenosis     GERD (gastroesophageal reflux disease)     Hiatal hernia     Thyroid disease      Past Surgical History:   Procedure Laterality Date    APPENDECTOMY      COLONOSCOPY N/A 8/28/2020    Procedure: COLONOSCOPY;  Surgeon: Domingo Durand MD;  Location: [facility] ([address] [place] [address]);  Service: Endoscopy;  Laterality: N/A;  Please schedule in the 2nd floor for airway protection body habitus down syndrome     COVID test at [place] on 8/25/20-GT    ESOPHAGOGASTRODUODENOSCOPY N/A 1/27/2020    Procedure: EGD (ESOPHAGOGASTRODUODENOSCOPY);  Surgeon: Violette Garrett MD;  Location: [facility];  Service: Endoscopy;  Laterality: N/A;    ESOPHAGOGASTRODUODENOSCOPY N/A 7/14/2020    Procedure: EGD (ESOPHAGOGASTRODUODENOSCOPY);  Surgeon: Domingo Durand MD;  Location: [facility] ([address] [place] [address]);  Service: Endoscopy;  Laterality: N/A;  Urgent EGD 2nd floor for airway protection due to difficult intubation airway anatomy for esophageal dilation.   Please schedule her next week 2nd floor for dysphagia.     COVID test at [place] on 7/11/20-GT    ESOPHAGOGASTRODUODENOSCOPY N/A 8/28/2020    Procedure: EGD (ESOPHAGOGASTRODUODENOSCOPY);  Surgeon: Domingo Durand MD;  Location: [facility] (2ND FLR);  Service: Endoscopy;  Laterality: N/A;  per Dr Kiser-add EGD to colonoscopy order  8/26-pt's mother confirmed appt-MS    ESOPHAGOGASTRODUODENOSCOPY N/A 2/9/2021    Procedure: EGD (ESOPHAGOGASTRODUODENOSCOPY);  Surgeon: Domingo Durand MD;  Location: [facility] (2ND FLR);  Service: Endoscopy;  Laterality: N/A;  Please schedule patient 2nd floor 1st available provider for esophageal dysphagia and history of it difficult intubation in airway per her father.  Patient with Down syndrome is here with her father he states that she has been having trouble swallowing    ESOPHAGOGASTRODUODENOSCOPY N/A 6/29/2021    Procedure: EGD (ESOPHAGOGASTRODUODENOSCOPY);  Surgeon: Ryan Floyd MD;  Location: [facility] (2ND FLR);  Service: Endoscopy;  Laterality: N/A;  dysphagia needs dialation     Please schedule patient 2nd floor 1st available provider for esophageal dysphagia and history of it difficult intubation in airway per her father.  Patient with Down syndrome. mother requesting ASAP  fully vaccinated-GT    esposgus      TONGUE SURGERY      TONSILLECTOMY       Family History   Problem Relation Age of Onset    Colon cancer Paternal Grandfather 60    Esophageal cancer Neg Hx     Celiac disease Neg Hx     Cirrhosis Neg Hx     Colon polyps Neg Hx      Social History     Tobacco Use    Smoking status: Never Smoker    Smokeless tobacco: Never Used   Substance Use Topics    Alcohol use: No    Drug use: Never     Review of Systems   Constitutional: Positive for fever.   HENT: Negative for sore throat.    Respiratory: Negative for shortness of breath.    Cardiovascular: Negative for chest pain.   Gastrointestinal: Positive for abdominal pain. Negative for nausea.    Genitourinary: Negative for dysuria.   Musculoskeletal: Negative for back pain.   Skin: Negative for rash.   Neurological: Negative for weakness.   Hematological: Does not bruise/bleed easily.   All other systems reviewed and are negative.    Physical Exam     Initial Vitals [04/13/22 1549]   BP Pulse Resp Temp SpO2   (!) 148/57 (!) 116 20 98.7 °F (37.1 °C) 95 %      MAP       --         Physical Exam    Nursing note and vitals reviewed.  Constitutional: She appears well-developed and well-nourished.   HENT:   Head: Normocephalic and atraumatic.   Eyes: Conjunctivae and EOM are normal. Pupils are equal, round, and reactive to light.   Neck:   Normal range of motion.  Cardiovascular: Normal heart sounds and intact distal pulses. Tachycardia present.  Exam reveals no gallop and no friction rub.    No murmur heard.  Pulmonary/Chest: Breath sounds normal. No respiratory distress. She has no wheezes. She has no rhonchi. She has no rales. She exhibits no tenderness.   Abdominal: Abdomen is soft. Bowel sounds are normal. She exhibits no distension and no mass. There is abdominal tenderness. There is no rebound and no guarding.   Musculoskeletal:         General: No tenderness or edema. Normal range of motion.      Cervical back: Normal range of motion.     Neurological: She is alert and oriented to person, place, and time. She has normal strength. GCS score is 15. GCS eye subscore is 4. GCS verbal subscore is 5. GCS motor subscore is 6.   Skin: Skin is warm. Capillary refill takes less than 2 seconds.       ED Course   Procedures  Labs Reviewed   CBC W/ AUTO DIFFERENTIAL - Abnormal; Notable for the following components:       Result Value    WBC 23.47 (*)     MCH 32.0 (*)     Immature Granulocytes 0.6 (*)     Gran # (ANC) 21.0 (*)     Immature Grans (Abs) 0.13 (*)     Mono # 1.2 (*)     Gran % 89.4 (*)     Lymph % 4.9 (*)     All other components within normal limits   COMPREHENSIVE METABOLIC PANEL - Abnormal; Notable  for the following components:    CO2 20 (*)     Albumin 3.4 (*)     All other components within normal limits   PROCALCITONIN - Abnormal; Notable for the following components:    Procalcitonin 1.72 (*)     All other components within normal limits   URINALYSIS, REFLEX TO URINE CULTURE - Abnormal; Notable for the following components:    Ketones, UA Trace (*)     Occult Blood UA 1+ (*)     All other components within normal limits    Narrative:     Specimen Source->Urine   URINALYSIS MICROSCOPIC - Abnormal; Notable for the following components:    RBC, UA 11 (*)     All other components within normal limits    Narrative:     Specimen Source->Urine   INFLUENZA A & B BY MOLECULAR   CULTURE, BLOOD   CULTURE, BLOOD   LACTIC ACID, PLASMA   LIPASE   LIPASE    Narrative:     ADD ON LIPAS ORD#[id number] PER [facility] 04/13/22 @1923   SARS-COV-2 RDRP GENE    Narrative:     This test utilizes isothermal nucleic acid amplification   technology to detect the SARS-CoV-2 RdRp nucleic acid segment.   The analytical sensitivity (limit of detection) is 125 genome   equivalents/mL.   A POSITIVE result implies infection with the SARS-CoV-2 virus;   the patient is presumed to be contagious.     A NEGATIVE result means that SARS-CoV-2 nucleic acids are not   present above the limit of detection. A NEGATIVE result should be   treated as presumptive. It does not rule out the possibility of   COVID-19 and should not be the sole basis for treatment decisions.   If COVID-19 is strongly suspected based on clinical and exposure   history, re-testing using an alternate molecular assay should be   considered.   This test is only for use under the Food and Drug   Administration s Emergency Use Authorization (EUA).   Commercial kits are provided by [employer].   Performance characteristics of the EUA have been independently   verified by [facility] Department of   Pathology and Laboratory Medicine.    _________________________________________________________________   The authorized Fact Sheet for Healthcare Providers and the authorized Fact   Sheet for Patients of the ID NOW COVID-19 are available on the FDA   website:     https://www.fda.gov/media/215013/download  https://www.fda.gov/media/933465/download            EKG Readings: (Independently Interpreted)   Initial Reading: No STEMI. Previous EKG: Compared with most recent EKG Rhythm: Normal Sinus Rhythm. Heart Rate: 64. Ectopy: No Ectopy. Conduction: Normal. ST Segments: Normal ST Segments. T Waves: Normal. Clinical Impression: Normal Sinus Rhythm       Imaging Results          CT Chest Abdomen Pelvis With Contrast (In process)  Result time 04/13/22 20:37:00   Procedure changed from CT Abdomen Pelvis With Contrast                X-Ray Chest AP Portable (Final result)  Result time 04/13/22 17:35:35    Final result by Laura Ayala MD (04/13/22 17:35:35)                 Impression:      No acute abnormality.      Electronically signed by: Laura Ayala  Date:    04/13/2022  Time:    17:35             Narrative:    EXAMINATION:  XR CHEST AP PORTABLE    CLINICAL HISTORY:  Perforation of esophagus    TECHNIQUE:  Single frontal view of the chest was performed.    COMPARISON:  08/18/2020, 07/10/2018 chest x-ray    FINDINGS:  The cardiomediastinal silhouette is nonenlarged.  Lungs appear clear.  There is no pneumothorax or pleural effusion.  Osseous structures appear intact.                                 Medications   vancomycin (VANCOCIN) 2,250 mg in dextrose 5 % 500 mL IVPB (has no administration in time range)   iohexoL (OMNIPAQUE 350) injection 100 mL (has no administration in time range)   iohexoL (OMNIPAQUE 350) injection 15 mL (has no administration in time range)   lactated ringers bolus 1,000 mL (1,000 mLs Intravenous New Bag 4/13/22 1902)     Medical Decision Making:   Initial Assessment:   30 y/o female which presents to the ED with fever and  epigastric abdominal pain which began after she underwent an EGD earlier today for dysphagia. Pt was able to eat after the procedure but then began to have abdominal pain and fever. Father advised by clinic to come to ED for further evaluation. Labs, CXR ordered to rule out perforation.   Differential Diagnosis:   Esophageal perforation, gastric ulcer, gastritis, cholecystitis, pancreatitis  Clinical Tests:   Lab Tests: Ordered and Reviewed  The following lab test(s) were unremarkable: Lactate, Lipase, Urinalysis and CMP       <> Summary of Lab: WBC elevated to 23.47      Radiological Study: Ordered and Reviewed  Medical Tests: Ordered and Reviewed  ED Management:  Pt examined and has epigastric pain with no fever. Due to recent EGD CXR ordered to rule out gastric perforation and basic labs were ordered. Normal CXR but Dad doesn't feel that they did it upright. Labs revealed a WBC of 23.47 and a sepsis work up was ordered. Blood pressure was concerning but Dad states it is her baseline. She was given a L of LR. CT chest and abdomen with contrast were ordered and are still pending. Lactate was normal. Patient care has been transitioned to Dr. Summers for final dispo pending CT and labs.              ED Course as of 04/13/22 2058 Wed Apr 13, 2022   1630 BP(!): 148/57 [AT]   1630 Temp: 98.7 °F (37.1 °C) [AT]   1630 Pulse(!): 116 [AT]   1630 Resp: 20 [AT]   1630 SpO2: 95 % [AT]   1820 WBC(!): 23.47 [AT]   1951 Lipase: 10 [AT]   2010 Ketones, UA(!): Trace [AT]   2010 Occult Blood UA(!): 1+ [AT]   2010 RBC, UA(!): 11 [AT]   2010 Lactate, Ra: 1.3 [AT]   2045 Influenza A, Molecular: Negative [AT]   2045 Influenza B, Molecular: Negative [AT]   2058 Procalcitonin(!): 1.72 [AT]      ED Course User Index  [AT] JACQUES Maloney             Clinical Impression:   Final diagnoses:  [K22.3] Esophageal perforation  [R00.0] Tachycardia                 JACQUES Maloney  04/13/22 2059

## 2022-04-13 NOTE — ED NOTES
Pt BIB family for abdominal cramping and fever after EGD today.  Pt denies N/V, denies SOB, denies HA, denies chest pain.  VSS.  Skin warm/dry.  Bed low/locked, siderails upx2, pt agrees to plan of care.   Pt needs refill on depo

## 2022-04-13 NOTE — TRANSFER OF CARE
"Anesthesia Transfer of Care Note    Patient: Mallory Abadie Bradbury    Procedure(s) Performed: Procedure(s) (LRB):  EGD (ESOPHAGOGASTRODUODENOSCOPY) (N/A)    Patient location: [facility]    Anesthesia Type: general    Transport from OR: Transported from OR on 2-3 L/min O2 by NC with adequate spontaneous ventilation    Post pain: adequate analgesia    Post assessment: no apparent anesthetic complications    Post vital signs: stable    Level of consciousness: sedated and responds to stimulation    Nausea/Vomiting: no nausea/vomiting    Complications: none    Transfer of care protocol was followed      Last vitals:   Visit Vitals  BP (!) 116/58   Pulse 62   Temp 36.6 °C (97.9 °F) (Temporal)   Resp 18   Ht 4' 8" (1.422 m)   Wt 92.5 kg (204 lb)   SpO2 100%   Breastfeeding No   BMI 45.74 kg/m²     "

## 2022-04-13 NOTE — ANESTHESIA POSTPROCEDURE EVALUATION
Anesthesia Post Evaluation    Patient: Mallory Abadie Bradbury    Procedure(s) Performed: Procedure(s) (LRB):  EGD (ESOPHAGOGASTRODUODENOSCOPY) (N/A)    Final Anesthesia Type: general      Patient location during evaluation: PACU  Patient participation: Yes- Able to Participate  Level of consciousness: awake and alert  Post-procedure vital signs: reviewed and stable  Pain management: adequate  Airway patency: patent    PONV status at discharge: No PONV  Anesthetic complications: no      Cardiovascular status: stable  Respiratory status: spontaneous ventilation and face mask  Hydration status: euvolemic  Follow-up not needed.          Vitals Value Taken Time   /52 04/13/22 0932   Temp 36.6 °C (97.9 °F) 04/13/22 0845   Pulse 72 04/13/22 0938   Resp 30 04/13/22 0938   SpO2 100 % 04/13/22 0938   Vitals shown include unvalidated device data.      No case tracking events are documented in the log.      Pain/Tere Score: Tere Score: 10 (4/13/2022  9:02 AM)

## 2022-04-13 NOTE — ANESTHESIA PREPROCEDURE EVALUATION
04/13/2022  Mallory Abadie Bradbury is a 31 y.o., female.    Patient Active Problem List    Diagnosis Date Noted    Iron deficiency 08/28/2020    Dysphagia 01/27/2020    Bilateral foot pain 05/30/2019    Corn or callus - Right Foot 05/30/2019          Peripheral IV - Single Lumen 02/09/21 1001 22 G Anterior;Right Hand (Active)   Number of days: 427            Peripheral IV - Single Lumen 04/13/22 0742 22 G Anterior;Distal;Right Forearm (Active)   Number of days: 0       Medications Prior to Admission   Medication Sig Dispense Refill Last Dose    cetirizine (ZYRTEC) 1 mg/mL syrup Take by mouth once daily.   4/12/2022 at Unknown time    cyanocobalamin (VITAMIN B-12) 1000 MCG tablet cyanocobalamin (vit B-12) 1,000 mcg tablet   Take 1 tablet every day by oral route for 90 days.   4/12/2022 at Unknown time    escitalopram oxalate (LEXAPRO) 20 MG tablet Take 1 tablet by mouth once daily.   4/12/2022 at Unknown time    estradiol cypionate (DEPO-ESTRADIOL) 5 mg/mL injection Inject into the muscle every 28 days.   Past Week at Unknown time    ferrous sulfate (FEOSOL) 325 mg (65 mg iron) Tab tablet Take 1 tablet (325 mg total) by mouth every 12 (twelve) hours. 60 tablet 4 4/12/2022 at Unknown time    folic acid (FOLVITE) 1 MG tablet TAKE 1 TABLET(1 MG) BY MOUTH EVERY DAY 30 tablet 11 4/12/2022 at Unknown time    levothyroxine (SYNTHROID) 75 MCG tablet Take 75 mcg by mouth once daily.    4/12/2022 at Unknown time    medroxyPROGESTERone (DEPO-PROVERA) 150 mg/mL injection   2 4/12/2022 at Unknown time    mupirocin calcium 2% nasl oint (BACTROBAN NASAL) 2 % Oint mupirocin 2 % topical ointment   APPLY A SMALL AMOUNT TO THE AFFECTED AREA BY TOPICAL ROUTE 3 TIMES PER DAY   4/12/2022 at Unknown time    budesonide 1 mg/2 mL NbSp budesonide 2 mg/day, usually in divided dose of 1 mg/2mL budesonide mixed with 5 g  sucralose orally for 8 weeks. (Patient not taking: Reported on 9/30/2020) 100 mL 0     hydrocortisone 2.5 % cream EMEKA SML AMT EXT AA BID  1     levothyroxine (SYNTHROID) 88 MCG tablet Take 88 mcg by mouth once daily.       pantoprazole (PROTONIX) 40 MG tablet Take 1 tablet (40 mg total) by mouth before breakfast. 90 tablet 3        Review of patient's allergies indicates:   Allergen Reactions    Amoxicillin Hives    Augmentin [amoxicillin-pot clavulanate] Rash    Bactrim [sulfamethoxazole-trimethoprim] Rash    Duricef [cefadroxil] Rash    Keflex [cephalexin] Rash    Rifampin Rash       Past Medical History:   Diagnosis Date    Difficult intubation     per dad     Down's syndrome     Esophageal stenosis     GERD (gastroesophageal reflux disease)     Hiatal hernia     Thyroid disease      Past Surgical History:   Procedure Laterality Date    APPENDECTOMY      COLONOSCOPY N/A 8/28/2020    Procedure: COLONOSCOPY;  Surgeon: Domingo Durand MD;  Location: [facility] ([address] [place] [address]);  Service: Endoscopy;  Laterality: N/A;  Please schedule in the 2nd floor for airway protection body habitus down syndrome     COVID test at [place] on 8/25/20-GT    ESOPHAGOGASTRODUODENOSCOPY N/A 1/27/2020    Procedure: EGD (ESOPHAGOGASTRODUODENOSCOPY);  Surgeon: Violette Garrett MD;  Location: [facility];  Service: Endoscopy;  Laterality: N/A;    ESOPHAGOGASTRODUODENOSCOPY N/A 7/14/2020    Procedure: EGD (ESOPHAGOGASTRODUODENOSCOPY);  Surgeon: Domingo Durand MD;  [facility]  Service: Endoscopy;  Laterality: N/A;  Urgent EGD 2nd floor for airway protection due to difficult intubation airway anatomy for esophageal dilation.  Please schedule her next week 2nd floor for dysphagia.     COVID test at [place] on 7/11/20-GT    ESOPHAGOGASTRODUODENOSCOPY N/A 8/28/2020    Procedure: EGD (ESOPHAGOGASTRODUODENOSCOPY);  Surgeon: Domingo Durand MD;  Location: [facility] ([address] [place] [address]);  Service: Endoscopy;  Laterality:  N/A;  per Dr Kiser-add EGD to colonoscopy order  8/26-pt's mother confirmed appt-MS    ESOPHAGOGASTRODUODENOSCOPY N/A 2/9/2021    Procedure: EGD (ESOPHAGOGASTRODUODENOSCOPY);  Surgeon: Domingo Durand MD;  Location: [facility] ([address] [place] [address]);  Service: Endoscopy;  Laterality: N/A;  Please schedule patient 2nd floor 1st available provider for esophageal dysphagia and history of it difficult intubation in airway per her father.  Patient with Down syndrome is here with her father he states that she has been having trouble swallowing    ESOPHAGOGASTRODUODENOSCOPY N/A 6/29/2021    Procedure: EGD (ESOPHAGOGASTRODUODENOSCOPY);  Surgeon: Ryan Floyd MD;  [facility]  Service: Endoscopy;  Laterality: N/A;  dysphagia needs dialation     Please schedule patient 2nd floor 1st available provider for esophageal dysphagia and history of it difficult intubation in airway per her father.  Patient with Down syndrome. mother requesting ASAP  fully vaccinated-GT    esposgus      TONGUE SURGERY      TONSILLECTOMY       Tobacco Use    Smoking status: Never Smoker    Smokeless tobacco: Never Used   Substance and Sexual Activity    Alcohol use: No    Drug use: Never    Sexual activity: Never       Objective:     Vital Signs (Most Recent):  Temp: 36.6 °C (97.9 °F) (04/13/22 0734)  Pulse: 62 (04/13/22 0734)  Resp: 18 (04/13/22 0734)  BP: (!) 116/58 (04/13/22 0735)  SpO2: 100 % (04/13/22 0734) Vital Signs (24h Range):  Temp:  [36.6 °C (97.9 °F)] 36.6 °C (97.9 °F)  Pulse:  [62] 62  Resp:  [18] 18  SpO2:  [100 %] 100 %  BP: (116)/(58) 116/58     Weight: 92.5 kg (204 lb)  Body mass index is 45.74 kg/m².        Significant Labs:  All pertinent labs from the last 24 hours have been reviewed.    CBC: No results for input(s): WBC, RBC, HGB, HCT, PLT, MCV, MCH, MCHC in the last 72 hours.    CMP: No results for input(s): NA, K, CL, CO2, BUN, CREATININE, GLU, MG, PHOS, CALCIUM, ALBUMIN, PROT, ALKPHOS, ALT, AST, BILITOT  in the last 72 hours.    INR  No results for input(s): PT, INR, PROTIME, APTT in the last 72 hours.      Pre-op Assessment    I have reviewed the Patient Summary Reports.     I have reviewed the Nursing Notes. I have reviewed the NPO Status.   I have reviewed the Medications.     Review of Systems  Anesthesia Hx:  Denies Family Hx of Anesthesia complications.   Denies Personal Hx of Anesthesia complications.       Physical Exam  General: Well nourished    Airway:  Mallampati: II   Mouth Opening: Normal  TM Distance: Normal  Tongue: Normal  Neck ROM: Normal ROM    Dental:Any loose and/or missing teeth verified with patient   Chest/Lungs:  Clear to auscultation    Heart:  Rate: Normal  Rhythm: Regular Rhythm  Sounds: Normal    Abdomen:  Normal        Anesthesia Plan  Type of Anesthesia, risks & benefits discussed:    Anesthesia Type: Gen ETT, Gen Supraglottic Airway, Gen Natural Airway  Intra-op Monitoring Plan: Standard ASA Monitors  Post Op Pain Control Plan: multimodal analgesia and IV/PO Opioids PRN  Induction:  IV  Informed Consent: Informed consent signed with the Patient and all parties understand the risks and agree with anesthesia plan.  All questions answered.   ASA Score: 3    Ready For Surgery From Anesthesia Perspective.     .  Difficult to intubate in past per father

## 2022-04-14 LAB
ABO + RH BLD: NORMAL
ANION GAP SERPL CALC-SCNC: 10 MMOL/L (ref 8–16)
BASOPHILS # BLD AUTO: 0.02 K/UL (ref 0–0.2)
BASOPHILS # BLD AUTO: 0.06 K/UL (ref 0–0.2)
BASOPHILS NFR BLD: 0.1 % (ref 0–1.9)
BASOPHILS NFR BLD: 0.2 % (ref 0–1.9)
BASOPHILS NFR BLD: 0.2 % (ref 0–1.9)
BASOPHILS NFR BLD: 0.3 % (ref 0–1.9)
BLD GP AB SCN CELLS X3 SERPL QL: NORMAL
BUN SERPL-MCNC: 8 MG/DL (ref 6–20)
CALCIUM SERPL-MCNC: 8.4 MG/DL (ref 8.7–10.5)
CHLORIDE SERPL-SCNC: 109 MMOL/L (ref 95–110)
CO2 SERPL-SCNC: 20 MMOL/L (ref 23–29)
CREAT SERPL-MCNC: 0.9 MG/DL (ref 0.5–1.4)
DIFFERENTIAL METHOD: ABNORMAL
EOSINOPHIL # BLD AUTO: 0 K/UL (ref 0–0.5)
EOSINOPHIL # BLD AUTO: 0.1 K/UL (ref 0–0.5)
EOSINOPHIL # BLD AUTO: 0.2 K/UL (ref 0–0.5)
EOSINOPHIL # BLD AUTO: 0.2 K/UL (ref 0–0.5)
EOSINOPHIL NFR BLD: 0 % (ref 0–8)
EOSINOPHIL NFR BLD: 0.5 % (ref 0–8)
EOSINOPHIL NFR BLD: 1 % (ref 0–8)
EOSINOPHIL NFR BLD: 1.1 % (ref 0–8)
ERYTHROCYTE [DISTWIDTH] IN BLOOD BY AUTOMATED COUNT: 14.3 % (ref 11.5–14.5)
ERYTHROCYTE [DISTWIDTH] IN BLOOD BY AUTOMATED COUNT: 14.5 % (ref 11.5–14.5)
ERYTHROCYTE [DISTWIDTH] IN BLOOD BY AUTOMATED COUNT: 14.6 % (ref 11.5–14.5)
ERYTHROCYTE [DISTWIDTH] IN BLOOD BY AUTOMATED COUNT: 14.6 % (ref 11.5–14.5)
EST. GFR  (AFRICAN AMERICAN): >60 ML/MIN/1.73 M^2
EST. GFR  (NON AFRICAN AMERICAN): >60 ML/MIN/1.73 M^2
GLUCOSE SERPL-MCNC: 80 MG/DL (ref 70–110)
HCT VFR BLD AUTO: 38.5 % (ref 37–48.5)
HCT VFR BLD AUTO: 38.9 % (ref 37–48.5)
HCT VFR BLD AUTO: 39.4 % (ref 37–48.5)
HCT VFR BLD AUTO: 40.1 % (ref 37–48.5)
HGB BLD-MCNC: 12.6 G/DL (ref 12–16)
HGB BLD-MCNC: 12.7 G/DL (ref 12–16)
HGB BLD-MCNC: 12.9 G/DL (ref 12–16)
HGB BLD-MCNC: 13.2 G/DL (ref 12–16)
IMM GRANULOCYTES # BLD AUTO: 0.08 K/UL (ref 0–0.04)
IMM GRANULOCYTES # BLD AUTO: 0.11 K/UL (ref 0–0.04)
IMM GRANULOCYTES # BLD AUTO: 0.12 K/UL (ref 0–0.04)
IMM GRANULOCYTES # BLD AUTO: 0.25 K/UL (ref 0–0.04)
IMM GRANULOCYTES NFR BLD AUTO: 0.4 % (ref 0–0.5)
IMM GRANULOCYTES NFR BLD AUTO: 0.5 % (ref 0–0.5)
IMM GRANULOCYTES NFR BLD AUTO: 0.5 % (ref 0–0.5)
IMM GRANULOCYTES NFR BLD AUTO: 0.9 % (ref 0–0.5)
LACTATE SERPL-SCNC: 1.3 MMOL/L (ref 0.5–2.2)
LYMPHOCYTES # BLD AUTO: 1.2 K/UL (ref 1–4.8)
LYMPHOCYTES # BLD AUTO: 1.8 K/UL (ref 1–4.8)
LYMPHOCYTES # BLD AUTO: 2 K/UL (ref 1–4.8)
LYMPHOCYTES # BLD AUTO: 2.4 K/UL (ref 1–4.8)
LYMPHOCYTES NFR BLD: 12.1 % (ref 18–48)
LYMPHOCYTES NFR BLD: 4.1 % (ref 18–48)
LYMPHOCYTES NFR BLD: 7.3 % (ref 18–48)
LYMPHOCYTES NFR BLD: 8.9 % (ref 18–48)
MAGNESIUM SERPL-MCNC: 1.7 MG/DL (ref 1.6–2.6)
MCH RBC QN AUTO: 31.8 PG (ref 27–31)
MCH RBC QN AUTO: 32.1 PG (ref 27–31)
MCH RBC QN AUTO: 32.2 PG (ref 27–31)
MCH RBC QN AUTO: 32.6 PG (ref 27–31)
MCHC RBC AUTO-ENTMCNC: 32 G/DL (ref 32–36)
MCHC RBC AUTO-ENTMCNC: 32.9 G/DL (ref 32–36)
MCHC RBC AUTO-ENTMCNC: 33 G/DL (ref 32–36)
MCHC RBC AUTO-ENTMCNC: 33.2 G/DL (ref 32–36)
MCV RBC AUTO: 100 FL (ref 82–98)
MCV RBC AUTO: 97 FL (ref 82–98)
MCV RBC AUTO: 98 FL (ref 82–98)
MCV RBC AUTO: 99 FL (ref 82–98)
MONOCYTES # BLD AUTO: 0.3 K/UL (ref 0.3–1)
MONOCYTES # BLD AUTO: 0.7 K/UL (ref 0.3–1)
MONOCYTES # BLD AUTO: 0.7 K/UL (ref 0.3–1)
MONOCYTES # BLD AUTO: 1 K/UL (ref 0.3–1)
MONOCYTES NFR BLD: 1.2 % (ref 4–15)
MONOCYTES NFR BLD: 3 % (ref 4–15)
MONOCYTES NFR BLD: 3.5 % (ref 4–15)
MONOCYTES NFR BLD: 3.6 % (ref 4–15)
NEUTROPHILS # BLD AUTO: 16.4 K/UL (ref 1.8–7.7)
NEUTROPHILS # BLD AUTO: 20.4 K/UL (ref 1.8–7.7)
NEUTROPHILS # BLD AUTO: 21.2 K/UL (ref 1.8–7.7)
NEUTROPHILS # BLD AUTO: 25.8 K/UL (ref 1.8–7.7)
NEUTROPHILS NFR BLD: 82.5 % (ref 38–73)
NEUTROPHILS NFR BLD: 88 % (ref 38–73)
NEUTROPHILS NFR BLD: 89.3 % (ref 38–73)
NEUTROPHILS NFR BLD: 90.8 % (ref 38–73)
NRBC BLD-RTO: 0 /100 WBC
PHOSPHATE SERPL-MCNC: 2.5 MG/DL (ref 2.7–4.5)
PLATELET # BLD AUTO: 239 K/UL (ref 150–450)
PLATELET # BLD AUTO: 249 K/UL (ref 150–450)
PLATELET # BLD AUTO: 251 K/UL (ref 150–450)
PLATELET # BLD AUTO: 261 K/UL (ref 150–450)
PMV BLD AUTO: 10 FL (ref 9.2–12.9)
PMV BLD AUTO: 10.1 FL (ref 9.2–12.9)
PMV BLD AUTO: 10.2 FL (ref 9.2–12.9)
PMV BLD AUTO: 10.4 FL (ref 9.2–12.9)
POCT GLUCOSE: 109 MG/DL (ref 70–110)
POTASSIUM SERPL-SCNC: 3.6 MMOL/L (ref 3.5–5.1)
RBC # BLD AUTO: 3.94 M/UL (ref 4–5.4)
RBC # BLD AUTO: 3.96 M/UL (ref 4–5.4)
RBC # BLD AUTO: 4.02 M/UL (ref 4–5.4)
RBC # BLD AUTO: 4.05 M/UL (ref 4–5.4)
RH BLD: NORMAL
SODIUM SERPL-SCNC: 139 MMOL/L (ref 136–145)
WBC # BLD AUTO: 19.9 K/UL (ref 3.9–12.7)
WBC # BLD AUTO: 22.87 K/UL (ref 3.9–12.7)
WBC # BLD AUTO: 24.09 K/UL (ref 3.9–12.7)
WBC # BLD AUTO: 28.34 K/UL (ref 3.9–12.7)

## 2022-04-14 PROCEDURE — 36415 COLL VENOUS BLD VENIPUNCTURE: CPT | Performed by: PHYSICIAN ASSISTANT

## 2022-04-14 PROCEDURE — 99226 PR SUBSEQUENT OBSERVATION CARE,LEVEL III: ICD-10-PCS | Mod: ,,, | Performed by: FAMILY MEDICINE

## 2022-04-14 PROCEDURE — 96366 THER/PROPH/DIAG IV INF ADDON: CPT | Performed by: EMERGENCY MEDICINE

## 2022-04-14 PROCEDURE — 96365 THER/PROPH/DIAG IV INF INIT: CPT | Mod: 59 | Performed by: EMERGENCY MEDICINE

## 2022-04-14 PROCEDURE — 80048 BASIC METABOLIC PNL TOTAL CA: CPT | Performed by: PHYSICIAN ASSISTANT

## 2022-04-14 PROCEDURE — 25000003 PHARM REV CODE 250: Performed by: EMERGENCY MEDICINE

## 2022-04-14 PROCEDURE — 99214 PR OFFICE/OUTPT VISIT, EST, LEVL IV, 30-39 MIN: ICD-10-PCS | Mod: ,,, | Performed by: INTERNAL MEDICINE

## 2022-04-14 PROCEDURE — 86901 BLOOD TYPING SEROLOGIC RH(D): CPT | Performed by: PHYSICIAN ASSISTANT

## 2022-04-14 PROCEDURE — 63600175 PHARM REV CODE 636 W HCPCS: Performed by: PHYSICIAN ASSISTANT

## 2022-04-14 PROCEDURE — G0378 HOSPITAL OBSERVATION PER HR: HCPCS

## 2022-04-14 PROCEDURE — 25000003 PHARM REV CODE 250: Performed by: FAMILY MEDICINE

## 2022-04-14 PROCEDURE — 36415 COLL VENOUS BLD VENIPUNCTURE: CPT | Performed by: INTERNAL MEDICINE

## 2022-04-14 PROCEDURE — 25000003 PHARM REV CODE 250: Performed by: PHYSICIAN ASSISTANT

## 2022-04-14 PROCEDURE — 83735 ASSAY OF MAGNESIUM: CPT | Performed by: PHYSICIAN ASSISTANT

## 2022-04-14 PROCEDURE — 63600175 PHARM REV CODE 636 W HCPCS: Performed by: EMERGENCY MEDICINE

## 2022-04-14 PROCEDURE — 96361 HYDRATE IV INFUSION ADD-ON: CPT | Performed by: EMERGENCY MEDICINE

## 2022-04-14 PROCEDURE — 86901 BLOOD TYPING SEROLOGIC RH(D): CPT | Mod: 91 | Performed by: PHYSICIAN ASSISTANT

## 2022-04-14 PROCEDURE — 85025 COMPLETE CBC W/AUTO DIFF WBC: CPT | Mod: 91 | Performed by: PHYSICIAN ASSISTANT

## 2022-04-14 PROCEDURE — 96367 TX/PROPH/DG ADDL SEQ IV INF: CPT | Performed by: EMERGENCY MEDICINE

## 2022-04-14 PROCEDURE — A4216 STERILE WATER/SALINE, 10 ML: HCPCS | Performed by: PHYSICIAN ASSISTANT

## 2022-04-14 PROCEDURE — 96376 TX/PRO/DX INJ SAME DRUG ADON: CPT | Performed by: EMERGENCY MEDICINE

## 2022-04-14 PROCEDURE — 99214 OFFICE O/P EST MOD 30 MIN: CPT | Mod: ,,, | Performed by: INTERNAL MEDICINE

## 2022-04-14 PROCEDURE — C9113 INJ PANTOPRAZOLE SODIUM, VIA: HCPCS | Performed by: PHYSICIAN ASSISTANT

## 2022-04-14 PROCEDURE — S0030 INJECTION, METRONIDAZOLE: HCPCS | Performed by: PHYSICIAN ASSISTANT

## 2022-04-14 PROCEDURE — 83605 ASSAY OF LACTIC ACID: CPT | Performed by: INTERNAL MEDICINE

## 2022-04-14 PROCEDURE — 99226 PR SUBSEQUENT OBSERVATION CARE,LEVEL III: CPT | Mod: ,,, | Performed by: FAMILY MEDICINE

## 2022-04-14 PROCEDURE — 84100 ASSAY OF PHOSPHORUS: CPT | Performed by: PHYSICIAN ASSISTANT

## 2022-04-14 PROCEDURE — 63600175 PHARM REV CODE 636 W HCPCS: Performed by: INTERNAL MEDICINE

## 2022-04-14 RX ORDER — METRONIDAZOLE 500 MG/100ML
500 INJECTION, SOLUTION INTRAVENOUS
Status: DISCONTINUED | OUTPATIENT
Start: 2022-04-14 | End: 2022-04-14

## 2022-04-14 RX ORDER — LEVOTHYROXINE SODIUM 75 UG/1
75 TABLET ORAL
Status: DISCONTINUED | OUTPATIENT
Start: 2022-04-14 | End: 2022-04-15 | Stop reason: HOSPADM

## 2022-04-14 RX ORDER — LEVOFLOXACIN 5 MG/ML
750 INJECTION, SOLUTION INTRAVENOUS
Status: DISCONTINUED | OUTPATIENT
Start: 2022-04-14 | End: 2022-04-15 | Stop reason: HOSPADM

## 2022-04-14 RX ORDER — LEVOTHYROXINE SODIUM 75 UG/1
75 TABLET ORAL
Status: DISCONTINUED | OUTPATIENT
Start: 2022-04-14 | End: 2022-04-14

## 2022-04-14 RX ORDER — ESCITALOPRAM OXALATE 20 MG/1
20 TABLET ORAL DAILY
Status: DISCONTINUED | OUTPATIENT
Start: 2022-04-14 | End: 2022-04-15 | Stop reason: HOSPADM

## 2022-04-14 RX ORDER — LEVOTHYROXINE SODIUM 75 UG/1
75 TABLET ORAL
Status: DISCONTINUED | OUTPATIENT
Start: 2022-04-16 | End: 2022-04-14

## 2022-04-14 RX ORDER — LEVOTHYROXINE SODIUM 88 UG/1
88 TABLET ORAL
Status: DISCONTINUED | OUTPATIENT
Start: 2022-04-15 | End: 2022-04-14

## 2022-04-14 RX ORDER — LEVOTHYROXINE SODIUM 88 UG/1
88 TABLET ORAL
Status: DISCONTINUED | OUTPATIENT
Start: 2022-04-15 | End: 2022-04-15 | Stop reason: HOSPADM

## 2022-04-14 RX ADMIN — MOXIFLOXACIN HYDROCHLORIDE 400 MG: 400 INJECTION, SOLUTION INTRAVENOUS at 02:04

## 2022-04-14 RX ADMIN — SODIUM CHLORIDE, SODIUM LACTATE, POTASSIUM CHLORIDE, AND CALCIUM CHLORIDE 500 ML: .6; .31; .03; .02 INJECTION, SOLUTION INTRAVENOUS at 05:04

## 2022-04-14 RX ADMIN — Medication 10 ML: at 10:04

## 2022-04-14 RX ADMIN — LEVOFLOXACIN 750 MG: 750 INJECTION, SOLUTION INTRAVENOUS at 10:04

## 2022-04-14 RX ADMIN — ACETAMINOPHEN 650 MG: 325 TABLET ORAL at 05:04

## 2022-04-14 RX ADMIN — PANTOPRAZOLE SODIUM 40 MG: 40 INJECTION, POWDER, FOR SOLUTION INTRAVENOUS at 09:04

## 2022-04-14 RX ADMIN — LEVOTHYROXINE SODIUM 75 MCG: 75 TABLET ORAL at 09:04

## 2022-04-14 RX ADMIN — ESCITALOPRAM OXALATE 20 MG: 20 TABLET ORAL at 10:04

## 2022-04-14 RX ADMIN — METRONIDAZOLE 500 MG: 500 INJECTION, SOLUTION INTRAVENOUS at 02:04

## 2022-04-14 RX ADMIN — METRONIDAZOLE 500 MG: 500 INJECTION, SOLUTION INTRAVENOUS at 05:04

## 2022-04-14 RX ADMIN — PROMETHAZINE HYDROCHLORIDE 12.5 MG: 25 INJECTION INTRAMUSCULAR; INTRAVENOUS at 12:04

## 2022-04-14 RX ADMIN — PANTOPRAZOLE SODIUM 40 MG: 40 INJECTION, POWDER, FOR SOLUTION INTRAVENOUS at 10:04

## 2022-04-14 NOTE — CARE UPDATE
RAPID RESPONSE NURSE PROACTIVE ROUNDING NOTE       Time of Visit: 440    Admit Date: 2022  LOS: 0  Code Status: Full Code   Date of Visit: 2022  : 1990  Age: 31 y.o.  Sex: female  Race: White  Bed: 7078/7078 A:   MRN: [id number]  Was the patient discharged from an ICU this admission? No   Was the patient discharged from a PACU within last 24 hours? No   Did the patient receive conscious sedation/general anesthesia in last 24 hours? No  Was the patient in the ED within the past 24 hours? Yes  Was the patient on NIPPV within the past 24 hours? No   Attending Physician: Silver Marsh MD  Primary Service: [facility] MED N   Time spent at the bedside: 15 -30 min    SITUATION    Notified by charge RN via phone call.  Reason for alert: Hypotension  Called to evaluate the patient for Circulatory    BACKGROUND     Why is the patient in the hospital?: Aspiration pneumonia of left lower lobe due to gastric secretions    Patient has a past medical history of Difficult intubation, Down's syndrome, Esophageal stenosis, GERD (gastroesophageal reflux disease), Hiatal hernia, and Thyroid disease.    Last Vitals:  Temp: 100.1 °F (37.8 °C) ( 0510)  Pulse: 91 ( 0500)  Resp: 26 (442)  BP: 102/47 ( 0500)  SpO2: 93 % (442)    24 Hours Vitals Range:  Temp:  [97.6 °F (36.4 °C)-100.1 °F (37.8 °C)]   Pulse:  []   Resp:  [12-26]   BP: ()/(47-66)   SpO2:  [93 %-100 %]     Labs:  Recent Labs     22   WBC 23.47* 22.87*   HGB 15.5 12.6   HCT 47.2 39.4    251       Recent Labs     22    139   K 3.9 3.6    109   CO2 20* 20*   CREATININE 1.0 0.9   GLU 96 80   PHOS  --  2.5*   MG  --  1.7        No results for input(s): PH, PCO2, PO2, HCO3, POCSATURATED, BE in the last 72 hours.     ASSESSMENT    Physical Exam  Constitutional:       Comments: Somnolent, easily falls asleep during conversation.    HENT:       Mouth/Throat:      Mouth: Mucous membranes are moist.      Pharynx: Oropharynx is clear.   Eyes:      Extraocular Movements: Extraocular movements intact.      Pupils: Pupils are equal, round, and reactive to light.   Cardiovascular:      Rate and Rhythm: Normal rate and regular rhythm.      Pulses: Normal pulses.      Comments: Palpable pulses in all distal extremities  Pulmonary:      Effort: Pulmonary effort is normal.   Musculoskeletal:         General: Normal range of motion.   Skin:     General: Skin is dry.      Capillary Refill: Capillary refill takes less than 2 seconds.      Comments: Hot to palpation   Neurological:      General: No focal deficit present.      Mental Status: She is alert and oriented to person, place, and time. Mental status is at baseline.   Psychiatric:         Mood and Affect: Mood normal.         Behavior: Behavior normal.         Thought Content: Thought content normal.         Judgment: Judgment normal.       INTERVENTIONS    Upon initial assessment, pt. Laying in hospital bed with primary RN, charge RN, and family at . Called by charge RN for persistent hypotension. Pt. Easily arousable to verbal stimuli, awakens, oriented x4, follows all commands, moves all extremities, no unilateral weakness noted, easily falls back to sleep during conversation. Denies cp, sob, n/v/d, headache, dizziness, or other complaints. POCT CB.    Airway: patent, non obstructed, able to maintain secretions.  Breathing: non labored, speaking full sentences, equal chest rise and fall, negative for cough.  Circulation: without gross hemorrhage or bleeding noted externally, skin hot and dry to palpation, pulses palpated in all distal extremities.     Vital Signs:  HR: 90 NSR w/o ectopy  BP: 79/44 (59)  SpO2: 95% RA  RR: 24  Temperature: 100.1 oral    HM team made aware of patient assessment per primary/charge RN. Plan to administer fluid bolus for noted hypotension per orders. Already antbx covered.  Tylenol given @ 0510. Blankets removed from patient s/t elevated temperature.     Vital Signs:  HR: 89 NSR w/o ectopy  BP: 102/47 (68)  SpO2: 95% RA  RR: 26    Plan to monitor hemodynamic status closely and reassess full set of vitals post fluid administration. Spoke with primary RNEstevan and updated on POC. Please call RRN with questions/concerns/deterioration of patient.     PROVIDER ESCALATION    Yes/No  yes    Orders received and case discussed with Dr. Anguiano.    Disposition: Remain in room 7084.    FOLLOW-UP    Bedside RNEstevan updated on plan of care. Instructed to call the Rapid Response Nurse, Pallavi Escobar RN at [phone] for additional questions or concerns.

## 2022-04-14 NOTE — NURSING
Pt awake alert and continues to ask for coke from parents. Pt's parent verbalizes understanding pt is NPO except for water with meds. Parents asks if fluids can be administered. MD notified. Awaiting response. Pt NPO at present. Cont antibiotic therapy without s/s of adverse reactions noted. Call light within reach. Will cont to monitor.

## 2022-04-14 NOTE — HPI
Mallory Abadie Bradbury is a 31 y.o. female with down's syndrome, hypothyroidism, GERD, and esophageal stricture s/p EGD dilation today (Dr. Hernandez) who presented with fever >101, coffee ground emesis, and abdominal pain post procedure. History was obtained from patient's mother who was present at bedside. Patient was stable post procedure and reported no pain. Nausea and coffee ground emesis began shortly after eating scrambled eggs. Pt's temperature spiked to max 101.3. Tylenol was given to her with no improvement of symptoms. They notified the GI team who performed the EGD and were told to come to the ED for further eval. The patient complains of a sore throat and abdominal discomfort.     ED: Tachycardic 116. Hypotensive 89/52, improved with IV fluids. CBC with leukocytosis of 23.47. Lactic WNL. Procal 1.72. CT chest and abdomen findings correlate with aspirations pneumonia, negative for perforation. Patient started on moxifloxacin, vanc, and flagyl Given 40 IV protonix.

## 2022-04-14 NOTE — PLAN OF CARE
[facility] - Telemetry Stepdown ([facility]-7)  Discharge Assessment    Primary Care Provider: Lizzie Ernst NP     Discharge Assessment (most recent)     BRIEF DISCHARGE ASSESSMENT - 04/14/22 1159        Discharge Planning    Assessment Type Discharge Planning Brief Assessment     Resource/Environmental Concerns none     Support Systems Spouse/significant other     Equipment Currently Used at Home none     Current Living Arrangements home/apartment/condo     Patient/Family Anticipates Transition to home     Patient/Family Anticipated Services at Transition none     DME Needed Upon Discharge  none     Discharge Plan A Home     Discharge Plan B Home               Pt is a 31 y.o. female admitted with Aspiration Pneumonia of the LLL. She has a PMH of Downs Syndrome, GERD and has an esophageal restretch s/p EGD dilation. She lives with her parents in a single story house with no steps. She requires SBA for bathing and dressing and can feed herself but is otherwise dependent on her parents. .blues Edmundo Hooks, RN,BSN

## 2022-04-14 NOTE — ASSESSMENT & PLAN NOTE
- 2 episodes of coffee ground emesis following EGD  - CT without evidence of esophageal perforation  - s/p 80 mg IV protonix, continue 40 mg IV BID  - H/H stable  - montor CBC daily and maintain active type and screen  - transfuse when clinically indicated  - GI consulted, appreciate recs  - NPO as precaution

## 2022-04-14 NOTE — SUBJECTIVE & OBJECTIVE
Past Medical History:   Diagnosis Date    Difficult intubation     per dad     Down's syndrome     Esophageal stenosis     GERD (gastroesophageal reflux disease)     Hiatal hernia     Thyroid disease        Past Surgical History:   Procedure Laterality Date    APPENDECTOMY      COLONOSCOPY N/A 8/28/2020    Procedure: COLONOSCOPY;  Surgeon: Domingo Durand MD;  Location: [facility] (2ND FLR);  Service: Endoscopy;  Laterality: N/A;  Please schedule in the 2nd floor for airway protection body habitus down syndrome     COVID test at [place] on 8/25/20-GT    ESOPHAGOGASTRODUODENOSCOPY N/A 1/27/2020    Procedure: EGD (ESOPHAGOGASTRODUODENOSCOPY);  Surgeon: Violette Garrett MD;  Location: [facility];  Service: Endoscopy;  Laterality: N/A;    ESOPHAGOGASTRODUODENOSCOPY N/A 7/14/2020    Procedure: EGD (ESOPHAGOGASTRODUODENOSCOPY);  Surgeon: Domingo Durand MD;  Location: [facility] (2ND FLR);  Service: Endoscopy;  Laterality: N/A;  Urgent EGD 2nd floor for airway protection due to difficult intubation airway anatomy for esophageal dilation.  Please schedule her next week 2nd floor for dysphagia.     COVID test at [place] on 7/11/20-GT    ESOPHAGOGASTRODUODENOSCOPY N/A 8/28/2020    Procedure: EGD (ESOPHAGOGASTRODUODENOSCOPY);  Surgeon: Domingo Durand MD;  Location: [facility] (2ND FLR);  Service: Endoscopy;  Laterality: N/A;  per Dr Kiser-add EGD to colonoscopy order  8/26-pt's mother confirmed appt-MS    ESOPHAGOGASTRODUODENOSCOPY N/A 2/9/2021    Procedure: EGD (ESOPHAGOGASTRODUODENOSCOPY);  Surgeon: Domingo Durand MD;  Location: [facility] (2ND FLR);  Service: Endoscopy;  Laterality: N/A;  Please schedule patient 2nd floor 1st available provider for esophageal dysphagia and history of it difficult intubation in airway per her father.  Patient with Down syndrome is here with her father he states that she has been having trouble swallowing    ESOPHAGOGASTRODUODENOSCOPY N/A 6/29/2021    Procedure: EGD  (ESOPHAGOGASTRODUODENOSCOPY);  Surgeon: Ryan Floyd MD;  Location: [facility] ([address] [place] [address]);  Service: Endoscopy;  Laterality: N/A;  dysphagia needs dialation     Please schedule patient 2nd floor 1st available provider for esophageal dysphagia and history of it difficult intubation in airway per her father.  Patient with Down syndrome. mother requesting ASAP  fully vaccinated-GT    ESOPHAGOGASTRODUODENOSCOPY N/A 4/13/2022    Procedure: EGD (ESOPHAGOGASTRODUODENOSCOPY);  Surgeon: Domingo Durand MD;  Location: [facility] ([address] [place] [address]);  Service: Endoscopy;  Laterality: N/A;  Dysphagia please evaluate for esophageal dilation     Please schedule patient 2nd floor 1st available provider for esophageal dysphagia and history of it difficult intubation in airway per her father.  Patient with Down syndrome. mother requesting ASAP    esposgus      TONGUE SURGERY      TONSILLECTOMY         Review of patient's allergies indicates:   Allergen Reactions    Amoxicillin Hives    Augmentin [amoxicillin-pot clavulanate] Rash    Bactrim [sulfamethoxazole-trimethoprim] Rash    Duricef [cefadroxil] Rash    Keflex [cephalexin] Rash    Rifampin Rash       Current Facility-Administered Medications on File Prior to Encounter   Medication    [DISCONTINUED] 0.9%  NaCl infusion    [DISCONTINUED] dexmedeTOMIDine injection    [DISCONTINUED] glycopyrrolate (PF) injection    [DISCONTINUED] LIDOcaine (cardiac) injection    [DISCONTINUED] phenylephrine injection    [DISCONTINUED] propofol (DIPRIVAN) 10 mg/mL infusion    [DISCONTINUED] propofol (DIPRIVAN) 10 mg/mL infusion    [DISCONTINUED] sodium chloride 0.9% flush 10 mL     Current Outpatient Medications on File Prior to Encounter   Medication Sig    budesonide 1 mg/2 mL NbSp budesonide 2 mg/day, usually in divided dose of 1 mg/2mL budesonide mixed with 5 g sucralose orally for 8 weeks. (Patient not taking: No sig reported)    cetirizine (ZYRTEC) 1 mg/mL syrup Take by mouth once daily.     cyanocobalamin (VITAMIN B-12) 1000 MCG tablet cyanocobalamin (vit B-12) 1,000 mcg tablet   Take 1 tablet every day by oral route for 90 days.    doxycycline hyclate 50 mg Tab Take by mouth.    escitalopram oxalate (LEXAPRO) 20 MG tablet Take 1 tablet by mouth once daily.    estradiol cypionate (DEPO-ESTRADIOL) 5 mg/mL injection Inject into the muscle every 28 days.    ferrous sulfate (FEOSOL) 325 mg (65 mg iron) Tab tablet Take 1 tablet (325 mg total) by mouth every 12 (twelve) hours.    fluconazole (DIFLUCAN) 150 MG Tab fluconazole 150 mg tablet    folic acid (FOLVITE) 1 MG tablet TAKE 1 TABLET(1 MG) BY MOUTH EVERY DAY    hydrocortisone 2.5 % cream EMEKA SML AMT EXT AA BID    levothyroxine (SYNTHROID) 75 MCG tablet Take 70 mcg by mouth every Tuesday, Thursday, Saturday, Sunday.    levothyroxine (SYNTHROID) 88 MCG tablet Take 88 mcg by mouth every Mon, Wed, Fri, Sun.    medroxyPROGESTERone (DEPO-PROVERA) 150 mg/mL injection     minocycline (MINOCIN,DYNACIN) 100 MG capsule minocycline 100 mg capsule    mupirocin calcium 2% nasl oint (BACTROBAN NASAL) 2 % Oint mupirocin 2 % topical ointment   APPLY A SMALL AMOUNT TO THE AFFECTED AREA BY TOPICAL ROUTE 3 TIMES PER DAY    pantoprazole (PROTONIX) 40 MG tablet Take 1 tablet (40 mg total) by mouth before breakfast.     Family History       Problem Relation (Age of Onset)    Colon cancer Paternal Grandfather (60)          Tobacco Use    Smoking status: Never Smoker    Smokeless tobacco: Never Used   Substance and Sexual Activity    Alcohol use: No    Drug use: Never    Sexual activity: Never     Review of Systems   Constitutional:  Positive for fever. Negative for chills.   HENT:  Positive for sore throat ((post EGD)). Negative for congestion and rhinorrhea.    Eyes:  Negative for photophobia and visual disturbance.   Respiratory:  Negative for chest tightness and shortness of breath.    Cardiovascular:  Negative for chest pain and leg swelling.   Gastrointestinal:  Positive  for abdominal pain, nausea and vomiting.   Genitourinary:  Negative for dysuria and urgency.   Musculoskeletal:  Negative for arthralgias and back pain.   Skin:  Negative for rash and wound.   Neurological:  Negative for dizziness and weakness.   Objective:     Vital Signs (Most Recent):  Temp: 98.9 °F (37.2 °C) (04/13/22 2217)  Pulse: 84 (04/13/22 2331)  Resp: 20 (04/13/22 2331)  BP: (!) 105/56 (04/13/22 2332)  SpO2: 96 % (04/13/22 2331)   Vital Signs (24h Range):  Temp:  [97.6 °F (36.4 °C)-98.9 °F (37.2 °C)] 98.9 °F (37.2 °C)  Pulse:  [] 84  Resp:  [12-20] 20  SpO2:  [95 %-100 %] 96 %  BP: ()/(52-66) 105/56     Weight: 93 kg (205 lb)  Body mass index is 45.96 kg/m².    Physical Exam  Vitals and nursing note reviewed.   Constitutional:       General: She is not in acute distress.     Appearance: She is well-developed. She is obese.   HENT:      Head: Normocephalic and atraumatic.      Mouth/Throat:      Mouth: Mucous membranes are moist.   Eyes:      Conjunctiva/sclera: Conjunctivae normal.      Pupils: Pupils are equal, round, and reactive to light.   Cardiovascular:      Rate and Rhythm: Normal rate and regular rhythm.      Heart sounds: Normal heart sounds.   Pulmonary:      Effort: Pulmonary effort is normal. No respiratory distress.      Breath sounds: Decreased breath sounds (LLL) present.   Abdominal:      General: Bowel sounds are normal. There is no distension.      Palpations: Abdomen is soft.      Tenderness: There is abdominal tenderness.   Musculoskeletal:         General: No tenderness. Normal range of motion.      Cervical back: Normal range of motion and neck supple.   Skin:     General: Skin is warm and dry.      Capillary Refill: Capillary refill takes less than 2 seconds.      Findings: No rash.   Neurological:      General: No focal deficit present.      Mental Status: She is alert.      Cranial Nerves: No cranial nerve deficit.      Sensory: No sensory deficit.      Coordination:  Coordination normal.   Psychiatric:         Behavior: Behavior normal.         Thought Content: Thought content normal.         Judgment: Judgment normal.         CRANIAL NERVES     CN III, IV, VI   Pupils are equal, round, and reactive to light.     Significant Labs: All pertinent labs within the past 24 hours have been reviewed.  CBC:   Recent Labs   Lab 04/13/22  1744   WBC 23.47*   HGB 15.5   HCT 47.2        CMP:   Recent Labs   Lab 04/13/22  1744      K 3.9      CO2 20*   GLU 96   BUN 8   CREATININE 1.0   CALCIUM 9.1   PROT 6.5   ALBUMIN 3.4*   BILITOT 1.0   ALKPHOS 83   AST 16   ALT 16   ANIONGAP 11   EGFRNONAA >60.0     Lactic Acid:   Recent Labs   Lab 04/13/22  1859   LACTATE 1.3     Urine Studies:   Recent Labs   Lab 04/13/22  1922   COLORU T[place]   APPEARANCEUA Clear   PHUR 5.0   SPECGRAV 1.030   PROTEINUA Negative   GLUCUA Negative   KETONESU Trace*   BILIRUBINUA Negative   OCCULTUA 1+*   NITRITE Negative   LEUKOCYTESUR Negative   RBCUA 11*   WBCUA 3   BACTERIA Rare   SQUAMEPITHEL 0       Significant Imaging: I have reviewed all pertinent imaging results/findings within the past 24 hours.  CT Chest Abdomen Pelvis With Contrast  Narrative: EXAMINATION:  CT CHEST ABDOMEN PELVIS WITH CONTRAST (XPD)    CLINICAL HISTORY:  Epigastric pain;    TECHNIQUE:  Axial images of the chest, abdomen, and pelvis were acquired  after the use of 100 cc [id number] IV contrast. 15 mL Omnipaque 350 oral contrast administered by mouth just prior to scan.  Coronal and sagittal reconstructions were also obtained    COMPARISON:  CT abdomen pelvis 05/04/2021    FINDINGS:  Thoracic soft tissues: Normal thyroid. No axillary lymphadenopathy.  No subcutaneous emphysema.    Aorta: Thoracic aorta is normal in caliber and contour without calcific atherosclerosis.    Heart: Normal in size.  Trace pericardial effusion, similar to prior.  No calcific coronary atherosclerosis.    Marina/Mediastinum: No mediastinal or hilar  lymphadenopathy. No pneumomediastinum.    Lungs: Trachea and bronchi are patent.  Patchy mostly dependent consolidative change in the left lower lobe with additional scattered ground-glass opacities throughout the left lung. Right lung clear without consolidation.  No pleural fluid or pneumothorax.    Liver: Normal in size and contour.  No focal hepatic lesion.    Gallbladder: No calcified gallstones.    Bile Ducts: No evidence of dilated ducts.    Pancreas: No mass or peripancreatic fat stranding.    Spleen: Unremarkable.    Esophagus: Distended with contrast.  No extraluminal contrast.  No esophageal wall thickening.    Stomach and duodenum: Small hiatal hernia, otherwise unremarkable.  There is contrast within the stomach and duodenum.  No extraluminal contrast.    Adrenals: Unremarkable.    Kidneys/Ureters: Normal in size and location. Normal enhancement. No hydronephrosis or nephrolithiasis. No ureteral dilatation.    Bladder: Minimally distended without evidence of wall thickening.    Reproductive organs: Unremarkable.    Bowel/Mesentery: Small bowel is normal in caliber with no evidence of obstruction.  No evidence of inflammation or wall thickening.  Appendix not definitively identified, surgically absent per history.  Colon demonstrates no focal wall thickening.    Peritoneum: No intraperitoneal free air or fluid.    Lymph nodes: No lymphadenopathy.    Abdominal wall:  Unremarkable.    Vasculature: No aneurysm. No calcific atherosclerosis.    Bones: No acute fracture. No suspicious osseous lesions.  Impression: 1. Significant GE reflux with patent GE junction.  No evidence of esophageal or gastric perforation.  2. Patchy consolidative change in the left lower lobe with additional scattered ground-glass opacities throughout the left lung.  Correlate for aspiration pneumonia/pneumonitis, given the degree of contrast within the thoracic esophagus and apparent reflux.  3. Trace pericardial effusion, similar to  prior.    Electronically signed by resident: Manish Karimi  Date:    04/13/2022  Time:    21:06    Electronically signed by: Rafiq Ayala  Date:    04/13/2022  Time:    21:50  X-Ray Chest AP Portable  Narrative: EXAMINATION:  XR CHEST AP PORTABLE    CLINICAL HISTORY:  Perforation of esophagus    TECHNIQUE:  Single frontal view of the chest was performed.    COMPARISON:  08/18/2020, 07/10/2018 chest x-ray    FINDINGS:  The cardiomediastinal silhouette is nonenlarged.  Lungs appear clear.  There is no pneumothorax or pleural effusion.  Osseous structures appear intact.  Impression: No acute abnormality.    Electronically signed by: Laura Ayala  Date:    04/13/2022  Time:    17:35

## 2022-04-14 NOTE — H&P
[facility] - Emergency Dept  [facility] Medicine  History & Physical    Patient Name: Mallory Abadie Bradbury  MRN: [id number]  Patient Class: OP- Observation  Admission Date: 4/13/2022  Attending Physician: Silver Marsh MD   Primary Care Provider: Lizzie Ernst NP         Patient information was obtained from patient, past medical records and ER records.     Subjective:     Principal Problem:Aspiration pneumonia of left lower lobe due to gastric secretions    Chief Complaint:   Chief Complaint   Patient presents with    Fever     Mom reports pt had esophageal dilatation this am-states she is running fever 101.2 and c/o stomach pain now.  Pt had Tylenol at 1330        HPI: Mallory Abadie Bradbury is a 31 y.o. female with down's syndrome, hypothyroidism, GERD, and esophageal stricture s/p EGD dilation today (Dr. Hernandez) who presented with fever >101, coffee ground emesis, and abdominal pain post procedure. History was obtained from patient's mother who was present at bedside. Patient was stable post procedure and reported no pain. Nausea and coffee ground emesis began shortly after eating scrambled eggs. Pt's temperature spiked to max 101.3. Tylenol was given to her with no improvement of symptoms. They notified the GI team who performed the EGD and were told to come to the ED for further eval. The patient complains of a sore throat and abdominal discomfort.     ED: Tachycardic 116. Hypotensive 89/52, improved with IV fluids. CBC with leukocytosis of 23.47. Lactic WNL. Procal 1.72. CT chest and abdomen findings correlate with aspirations pneumonia, negative for perforation. Patient started on moxifloxacin, vanc, and flagyl Given 40 IV protonix.       Past Medical History:   Diagnosis Date    Difficult intubation     per dad     Down's syndrome     Esophageal stenosis     GERD (gastroesophageal reflux disease)     Hiatal hernia     Thyroid disease        Past Surgical History:   Procedure Laterality  Date    APPENDECTOMY      COLONOSCOPY N/A 8/28/2020    Procedure: COLONOSCOPY;  Surgeon: Domingo Durand MD;  Location: [facility] (2ND FLR);  Service: Endoscopy;  Laterality: N/A;  Please schedule in the 2nd floor for airway protection body habitus down syndrome     COVID test at [place] on 8/25/20-GT    ESOPHAGOGASTRODUODENOSCOPY N/A 1/27/2020    Procedure: EGD (ESOPHAGOGASTRODUODENOSCOPY);  Surgeon: Violette Garrett MD;  Location: [facility];  Service: Endoscopy;  Laterality: N/A;    ESOPHAGOGASTRODUODENOSCOPY N/A 7/14/2020    Procedure: EGD (ESOPHAGOGASTRODUODENOSCOPY);  Surgeon: Domingo Durand MD;  Location: [facility] (2ND FLR);  Service: Endoscopy;  Laterality: N/A;  Urgent EGD 2nd floor for airway protection due to difficult intubation airway anatomy for esophageal dilation.  Please schedule her next week 2nd floor for dysphagia.     COVID test at [facility] on 7/11/20-GT    ESOPHAGOGASTRODUODENOSCOPY N/A 8/28/2020    Procedure: EGD (ESOPHAGOGASTRODUODENOSCOPY);  Surgeon: Domingo Durand MD;  Location: [facility] (2ND FLR);  Service: Endoscopy;  Laterality: N/A;  per Dr Kiser-add EGD to colonoscopy order  8/26-pt's mother confirmed appt-MS    ESOPHAGOGASTRODUODENOSCOPY N/A 2/9/2021    Procedure: EGD (ESOPHAGOGASTRODUODENOSCOPY);  Surgeon: Domingo Durand MD;  Location: [facility] (2ND FLR);  Service: Endoscopy;  Laterality: N/A;  Please schedule patient 2nd floor 1st available provider for esophageal dysphagia and history of it difficult intubation in airway per her father.  Patient with Down syndrome is here with her father he states that she has been having trouble swallowing    ESOPHAGOGASTRODUODENOSCOPY N/A 6/29/2021    Procedure: EGD (ESOPHAGOGASTRODUODENOSCOPY);  Surgeon: Ryan Floyd MD;  Location: [facility] (2ND FLR);  Service: Endoscopy;  Laterality: N/A;  dysphagia needs dialation     Please schedule patient 2nd floor 1st available provider for esophageal dysphagia and history of  it difficult intubation in airway per her father.  Patient with Down syndrome. mother requesting ASAP  fully vaccinated-GT    ESOPHAGOGASTRODUODENOSCOPY N/A 4/13/2022    Procedure: EGD (ESOPHAGOGASTRODUODENOSCOPY);  Surgeon: Domingo Durand MD;  Location: [facility] ([address] [place] [address]);  Service: Endoscopy;  Laterality: N/A;  Dysphagia please evaluate for esophageal dilation     Please schedule patient 2nd floor 1st available provider for esophageal dysphagia and history of it difficult intubation in airway per her father.  Patient with Down syndrome. mother requesting ASAP    esposgus      TONGUE SURGERY      TONSILLECTOMY         Review of patient's allergies indicates:   Allergen Reactions    Amoxicillin Hives    Augmentin [amoxicillin-pot clavulanate] Rash    Bactrim [sulfamethoxazole-trimethoprim] Rash    Duricef [cefadroxil] Rash    Keflex [cephalexin] Rash    Rifampin Rash       Current Facility-Administered Medications on File Prior to Encounter   Medication    [DISCONTINUED] 0.9%  NaCl infusion    [DISCONTINUED] dexmedeTOMIDine injection    [DISCONTINUED] glycopyrrolate (PF) injection    [DISCONTINUED] LIDOcaine (cardiac) injection    [DISCONTINUED] phenylephrine injection    [DISCONTINUED] propofol (DIPRIVAN) 10 mg/mL infusion    [DISCONTINUED] propofol (DIPRIVAN) 10 mg/mL infusion    [DISCONTINUED] sodium chloride 0.9% flush 10 mL     Current Outpatient Medications on File Prior to Encounter   Medication Sig    budesonide 1 mg/2 mL NbSp budesonide 2 mg/day, usually in divided dose of 1 mg/2mL budesonide mixed with 5 g sucralose orally for 8 weeks. (Patient not taking: No sig reported)    cetirizine (ZYRTEC) 1 mg/mL syrup Take by mouth once daily.    cyanocobalamin (VITAMIN B-12) 1000 MCG tablet cyanocobalamin (vit B-12) 1,000 mcg tablet   Take 1 tablet every day by oral route for 90 days.    doxycycline hyclate 50 mg Tab Take by mouth.    escitalopram oxalate (LEXAPRO) 20 MG tablet Take 1  tablet by mouth once daily.    estradiol cypionate (DEPO-ESTRADIOL) 5 mg/mL injection Inject into the muscle every 28 days.    ferrous sulfate (FEOSOL) 325 mg (65 mg iron) Tab tablet Take 1 tablet (325 mg total) by mouth every 12 (twelve) hours.    fluconazole (DIFLUCAN) 150 MG Tab fluconazole 150 mg tablet    folic acid (FOLVITE) 1 MG tablet TAKE 1 TABLET(1 MG) BY MOUTH EVERY DAY    hydrocortisone 2.5 % cream EMEKA SML AMT EXT AA BID    levothyroxine (SYNTHROID) 75 MCG tablet Take 70 mcg by mouth every Tuesday, Thursday, Saturday, Sunday.    levothyroxine (SYNTHROID) 88 MCG tablet Take 88 mcg by mouth every Mon, Wed, Fri, Sun.    medroxyPROGESTERone (DEPO-PROVERA) 150 mg/mL injection     minocycline (MINOCIN,DYNACIN) 100 MG capsule minocycline 100 mg capsule    mupirocin calcium 2% nasl oint (BACTROBAN NASAL) 2 % Oint mupirocin 2 % topical ointment   APPLY A SMALL AMOUNT TO THE AFFECTED AREA BY TOPICAL ROUTE 3 TIMES PER DAY    pantoprazole (PROTONIX) 40 MG tablet Take 1 tablet (40 mg total) by mouth before breakfast.     Family History       Problem Relation (Age of Onset)    Colon cancer Paternal Grandfather (60)          Tobacco Use    Smoking status: Never Smoker    Smokeless tobacco: Never Used   Substance and Sexual Activity    Alcohol use: No    Drug use: Never    Sexual activity: Never     Review of Systems   Constitutional:  Positive for fever. Negative for chills.   HENT:  Positive for sore throat ((post EGD)). Negative for congestion and rhinorrhea.    Eyes:  Negative for photophobia and visual disturbance.   Respiratory:  Negative for chest tightness and shortness of breath.    Cardiovascular:  Negative for chest pain and leg swelling.   Gastrointestinal:  Positive for abdominal pain, nausea and vomiting.   Genitourinary:  Negative for dysuria and urgency.   Musculoskeletal:  Negative for arthralgias and back pain.   Skin:  Negative for rash and wound.   Neurological:  Negative for  dizziness and weakness.   Objective:     Vital Signs (Most Recent):  Temp: 98.9 °F (37.2 °C) (04/13/22 2217)  Pulse: 84 (04/13/22 2331)  Resp: 20 (04/13/22 2331)  BP: (!) 105/56 (04/13/22 2332)  SpO2: 96 % (04/13/22 2331)   Vital Signs (24h Range):  Temp:  [97.6 °F (36.4 °C)-98.9 °F (37.2 °C)] 98.9 °F (37.2 °C)  Pulse:  [] 84  Resp:  [12-20] 20  SpO2:  [95 %-100 %] 96 %  BP: ()/(52-66) 105/56     Weight: 93 kg (205 lb)  Body mass index is 45.96 kg/m².    Physical Exam  Vitals and nursing note reviewed.   Constitutional:       General: She is not in acute distress.     Appearance: She is well-developed. She is obese.   HENT:      Head: Normocephalic and atraumatic.      Mouth/Throat:      Mouth: Mucous membranes are moist.   Eyes:      Conjunctiva/sclera: Conjunctivae normal.      Pupils: Pupils are equal, round, and reactive to light.   Cardiovascular:      Rate and Rhythm: Normal rate and regular rhythm.      Heart sounds: Normal heart sounds.   Pulmonary:      Effort: Pulmonary effort is normal. No respiratory distress.      Breath sounds: Decreased breath sounds (LLL) present.   Abdominal:      General: Bowel sounds are normal. There is no distension.      Palpations: Abdomen is soft.      Tenderness: There is abdominal tenderness.   Musculoskeletal:         General: No tenderness. Normal range of motion.      Cervical back: Normal range of motion and neck supple.   Skin:     General: Skin is warm and dry.      Capillary Refill: Capillary refill takes less than 2 seconds.      Findings: No rash.   Neurological:      General: No focal deficit present.      Mental Status: She is alert.      Cranial Nerves: No cranial nerve deficit.      Sensory: No sensory deficit.      Coordination: Coordination normal.   Psychiatric:         Behavior: Behavior normal.         Thought Content: Thought content normal.         Judgment: Judgment normal.         CRANIAL NERVES     CN III, IV, VI   Pupils are equal, round,  and reactive to light.     Significant Labs: All pertinent labs within the past 24 hours have been reviewed.  CBC:   Recent Labs   Lab 04/13/22  1744   WBC 23.47*   HGB 15.5   HCT 47.2        CMP:   Recent Labs   Lab 04/13/22  1744      K 3.9      CO2 20*   GLU 96   BUN 8   CREATININE 1.0   CALCIUM 9.1   PROT 6.5   ALBUMIN 3.4*   BILITOT 1.0   ALKPHOS 83   AST 16   ALT 16   ANIONGAP 11   EGFRNONAA >60.0     Lactic Acid:   Recent Labs   Lab 04/13/22  1859   LACTATE 1.3     Urine Studies:   Recent Labs   Lab 04/13/22  1922   COLORU Tawana   APPEARANCEUA Clear   PHUR 5.0   SPECGRAV 1.030   PROTEINUA Negative   GLUCUA Negative   KETONESU Trace*   BILIRUBINUA Negative   OCCULTUA 1+*   NITRITE Negative   LEUKOCYTESUR Negative   RBCUA 11*   WBCUA 3   BACTERIA Rare   SQUAMEPITHEL 0       Significant Imaging: I have reviewed all pertinent imaging results/findings within the past 24 hours.  CT Chest Abdomen Pelvis With Contrast  Narrative: EXAMINATION:  CT CHEST ABDOMEN PELVIS WITH CONTRAST (XPD)    CLINICAL HISTORY:  Epigastric pain;    TECHNIQUE:  Axial images of the chest, abdomen, and pelvis were acquired  after the use of 100 cc [id number] IV contrast. 15 mL Omnipaque 350 oral contrast administered by mouth just prior to scan.  Coronal and sagittal reconstructions were also obtained    COMPARISON:  CT abdomen pelvis 05/04/2021    FINDINGS:  Thoracic soft tissues: Normal thyroid. No axillary lymphadenopathy.  No subcutaneous emphysema.    Aorta: Thoracic aorta is normal in caliber and contour without calcific atherosclerosis.    Heart: Normal in size.  Trace pericardial effusion, similar to prior.  No calcific coronary atherosclerosis.    Marina/Mediastinum: No mediastinal or hilar lymphadenopathy. No pneumomediastinum.    Lungs: Trachea and bronchi are patent.  Patchy mostly dependent consolidative change in the left lower lobe with additional scattered ground-glass opacities throughout the left lung. Right  lung clear without consolidation.  No pleural fluid or pneumothorax.    Liver: Normal in size and contour.  No focal hepatic lesion.    Gallbladder: No calcified gallstones.    Bile Ducts: No evidence of dilated ducts.    Pancreas: No mass or peripancreatic fat stranding.    Spleen: Unremarkable.    Esophagus: Distended with contrast.  No extraluminal contrast.  No esophageal wall thickening.    Stomach and duodenum: Small hiatal hernia, otherwise unremarkable.  There is contrast within the stomach and duodenum.  No extraluminal contrast.    Adrenals: Unremarkable.    Kidneys/Ureters: Normal in size and location. Normal enhancement. No hydronephrosis or nephrolithiasis. No ureteral dilatation.    Bladder: Minimally distended without evidence of wall thickening.    Reproductive organs: Unremarkable.    Bowel/Mesentery: Small bowel is normal in caliber with no evidence of obstruction.  No evidence of inflammation or wall thickening.  Appendix not definitively identified, surgically absent per history.  Colon demonstrates no focal wall thickening.    Peritoneum: No intraperitoneal free air or fluid.    Lymph nodes: No lymphadenopathy.    Abdominal wall:  Unremarkable.    Vasculature: No aneurysm. No calcific atherosclerosis.    Bones: No acute fracture. No suspicious osseous lesions.  Impression: 1. Significant GE reflux with patent GE junction.  No evidence of esophageal or gastric perforation.  2. Patchy consolidative change in the left lower lobe with additional scattered ground-glass opacities throughout the left lung.  Correlate for aspiration pneumonia/pneumonitis, given the degree of contrast within the thoracic esophagus and apparent reflux.  3. Trace pericardial effusion, similar to prior.    Electronically signed by resident: Manish Karimi  Date:    04/13/2022  Time:    21:06    Electronically signed by: Rafiq Ayala  Date:    04/13/2022  Time:    21:50  X-Ray Chest AP Portable  Narrative: EXAMINATION:  XR  CHEST AP PORTABLE    CLINICAL HISTORY:  Perforation of esophagus    TECHNIQUE:  Single frontal view of the chest was performed.    COMPARISON:  08/18/2020, 07/10/2018 chest x-ray    FINDINGS:  The cardiomediastinal silhouette is nonenlarged.  Lungs appear clear.  There is no pneumothorax or pleural effusion.  Osseous structures appear intact.  Impression: No acute abnormality.    Electronically signed by: Laura Ayala  Date:    04/13/2022  Time:    17:35      Assessment/Plan:     * Aspiration pneumonia of left lower lobe due to gastric secretions  - 2/4 SIRS , leukocytosis 23.47  - procal 1.72, lactic WNL  - CT chest with patchy consolidative change in the left lower lobe with additional scattered ground-glass opacities throughout the left lung.  - f/u blood and sputum cultures  - started on vanc, flagyl, and moxifloxocin in ED, will continue  - aspiration precautions    Coffee ground emesis  - 2 episodes of coffee ground emesis following EGD  - CT without evidence of esophageal perforation  - s/p 80 mg IV protonix, continue 40 mg IV BID  - H/H stable  - montor CBC daily and maintain active type and screen  - transfuse when clinically indicated  - GI consulted, appreciate recs  - NPO as precaution    Stricture of esophagus  - s/p dilation 4/13    Disease of thyroid gland  - continue synthroid  Lab Results   Component Value Date    TSH 2.421 11/09/2021       Depressive disorder  - continue lexapro 20 mg daily    VTE Risk Mitigation (From admission, onward)         Ordered     IP VTE HIGH RISK PATIENT  Once         04/13/22 0598     Place sequential compression device  Until discontinued         04/13/22 0377                   Gladys Nava PA-C  Department of Hospital Medicine   [facility] - Emergency Dept

## 2022-04-14 NOTE — MED STUDENT SUBJECTIVE & OBJECTIVE
Medical Student Subjective & Objective     Principal Problem: Aspiration pneumonia of left lower lobe due to gastric secretions    Chief Complaint:   Chief Complaint   Patient presents with    Fever     Mom reports pt had esophageal dilatation this am-states she is running fever 101.2 and c/o stomach pain now.  Pt had Tylenol at 1330       HPI: Mallory Abadie Bradbury is a 31 y.o. female with down's syndrome, hypothyroidism, GERD, and esophageal stricture s/p EGD dilation today (Dr. Hernandez) who presented with fever >101, coffee ground emesis, and abdominal pain post procedure. History was obtained from patient's mother who was present at bedside. Patient was stable post procedure and reported no pain. Nausea and coffee ground emesis began shortly after eating scrambled eggs. Pt's temperature spiked to max 101.3. Tylenol was given to her with no improvement of symptoms. They notified the GI team who performed the EGD and were told to come to the ED for further eval. The patient complains of a sore throat and abdominal discomfort.     ED: Tachycardic 116. Hypotensive 89/52, improved with IV fluids. CBC with leukocytosis of 23.47. Lactic WNL. Procal 1.72. CT chest and abdomen findings correlate with aspirations pneumonia, negative for perforation. Patient started on moxifloxacin, vanc, and flagyl Given 40 IV protonix.       Hospital Course: No notes on file      Past Medical History:   Diagnosis Date    Difficult intubation     per dad     Down's syndrome     Esophageal stenosis     GERD (gastroesophageal reflux disease)     Hiatal hernia     Thyroid disease        Past Surgical History:   Procedure Laterality Date    APPENDECTOMY      COLONOSCOPY N/A 8/28/2020    Procedure: COLONOSCOPY;  Surgeon: Domingo Durand MD;  [facility] ([address] [place] [address]);  Service: Endoscopy;  Laterality: N/A;  Please schedule in the 2nd floor for airway protection body habitus down syndrome     COVID test at [place] on  8/25/20-GT    ESOPHAGOGASTRODUODENOSCOPY N/A 1/27/2020    Procedure: EGD (ESOPHAGOGASTRODUODENOSCOPY);  Surgeon: Violette Garrett MD;  Location: [facility];  Service: Endoscopy;  Laterality: N/A;    ESOPHAGOGASTRODUODENOSCOPY N/A 7/14/2020    Procedure: EGD (ESOPHAGOGASTRODUODENOSCOPY);  Surgeon: Domingo Durand MD;  Location: [facility] (2ND FLR);  Service: Endoscopy;  Laterality: N/A;  Urgent EGD 2nd floor for airway protection due to difficult intubation airway anatomy for esophageal dilation.  Please schedule her next week 2nd floor for dysphagia.     COVID test at [place] on 7/11/20-GT    ESOPHAGOGASTRODUODENOSCOPY N/A 8/28/2020    Procedure: EGD (ESOPHAGOGASTRODUODENOSCOPY);  Surgeon: Domingo Durand MD;  Location: [facility] (2ND FLR);  Service: Endoscopy;  Laterality: N/A;  per Dr Kiser-add EGD to colonoscopy order  8/26-pt's mother confirmed appt-MS    ESOPHAGOGASTRODUODENOSCOPY N/A 2/9/2021    Procedure: EGD (ESOPHAGOGASTRODUODENOSCOPY);  Surgeon: Domingo Durand MD;  Location: [facility] (2ND FLR);  Service: Endoscopy;  Laterality: N/A;  Please schedule patient 2nd floor 1st available provider for esophageal dysphagia and history of it difficult intubation in airway per her father.  Patient with Down syndrome is here with her father he states that she has been having trouble swallowing    ESOPHAGOGASTRODUODENOSCOPY N/A 6/29/2021    Procedure: EGD (ESOPHAGOGASTRODUODENOSCOPY);  Surgeon: Ryan Floyd MD;  Location: [facility] (2ND FLR);  Service: Endoscopy;  Laterality: N/A;  dysphagia needs dialation     Please schedule patient 2nd floor 1st available provider for esophageal dysphagia and history of it difficult intubation in airway per her father.  Patient with Down syndrome. mother requesting ASAP  fully vaccinated-GT    ESOPHAGOGASTRODUODENOSCOPY N/A 4/13/2022    Procedure: EGD (ESOPHAGOGASTRODUODENOSCOPY);  Surgeon: Domingo Durand MD;  Location: [facility] ([facility]);  Service:  Endoscopy;  Laterality: N/A;  Dysphagia please evaluate for esophageal dilation     Please schedule patient 2nd floor 1st available provider for esophageal dysphagia and history of it difficult intubation in airway per her father.  Patient with Down syndrome. mother requesting ASAP    esposgus      TONGUE SURGERY      TONSILLECTOMY         Review of patient's allergies indicates:   Allergen Reactions    Amoxicillin Hives    Augmentin [amoxicillin-pot clavulanate] Rash    Bactrim [sulfamethoxazole-trimethoprim] Rash    Duricef [cefadroxil] Rash    Keflex [cephalexin] Rash    Rifampin Rash       Current Facility-Administered Medications on File Prior to Encounter   Medication    [DISCONTINUED] 0.9%  NaCl infusion    [DISCONTINUED] dexmedeTOMIDine injection    [DISCONTINUED] glycopyrrolate (PF) injection    [DISCONTINUED] LIDOcaine (cardiac) injection    [DISCONTINUED] phenylephrine injection    [DISCONTINUED] propofol (DIPRIVAN) 10 mg/mL infusion    [DISCONTINUED] propofol (DIPRIVAN) 10 mg/mL infusion    [DISCONTINUED] sodium chloride 0.9% flush 10 mL     Current Outpatient Medications on File Prior to Encounter   Medication Sig    budesonide 1 mg/2 mL NbSp budesonide 2 mg/day, usually in divided dose of 1 mg/2mL budesonide mixed with 5 g sucralose orally for 8 weeks. (Patient not taking: No sig reported)    cetirizine (ZYRTEC) 1 mg/mL syrup Take by mouth once daily.    cyanocobalamin (VITAMIN B-12) 1000 MCG tablet cyanocobalamin (vit B-12) 1,000 mcg tablet   Take 1 tablet every day by oral route for 90 days.    doxycycline hyclate 50 mg Tab Take by mouth.    escitalopram oxalate (LEXAPRO) 20 MG tablet Take 1 tablet by mouth once daily.    estradiol cypionate (DEPO-ESTRADIOL) 5 mg/mL injection Inject into the muscle every 28 days.    ferrous sulfate (FEOSOL) 325 mg (65 mg iron) Tab tablet Take 1 tablet (325 mg total) by mouth every 12 (twelve) hours.    fluconazole (DIFLUCAN) 150 MG Tab  fluconazole 150 mg tablet    folic acid (FOLVITE) 1 MG tablet TAKE 1 TABLET(1 MG) BY MOUTH EVERY DAY    hydrocortisone 2.5 % cream EMEKA SML AMT EXT AA BID    levothyroxine (SYNTHROID) 75 MCG tablet Take 75 mcg by mouth once daily.     levothyroxine (SYNTHROID) 88 MCG tablet Take 88 mcg by mouth once daily.    medroxyPROGESTERone (DEPO-PROVERA) 150 mg/mL injection     minocycline (MINOCIN,DYNACIN) 100 MG capsule minocycline 100 mg capsule    mupirocin calcium 2% nasl oint (BACTROBAN NASAL) 2 % Oint mupirocin 2 % topical ointment   APPLY A SMALL AMOUNT TO THE AFFECTED AREA BY TOPICAL ROUTE 3 TIMES PER DAY    pantoprazole (PROTONIX) 40 MG tablet Take 1 tablet (40 mg total) by mouth before breakfast.     Family History     Problem Relation (Age of Onset)    Colon cancer Paternal Grandfather (60)        Tobacco Use    Smoking status: Never Smoker    Smokeless tobacco: Never Used   Substance and Sexual Activity    Alcohol use: No    Drug use: Never    Sexual activity: Never     Review of Systems   Constitutional: Positive for fever.   HENT: Negative.    Eyes: Negative.    Respiratory: Negative.    Gastrointestinal: Positive for abdominal pain, nausea and vomiting.   Endocrine: Negative.    Neurological: Negative.      Objective:     Vital Signs (Most Recent):  Temp: 98.9 °F (37.2 °C) (04/13/22 2217)  Pulse: 84 (04/13/22 2331)  Resp: 20 (04/13/22 2331)  BP: (!) 105/56 (04/13/22 2332)  SpO2: 96 % (04/13/22 2331) Vital Signs (24h Range):  Temp:  [97.6 °F (36.4 °C)-98.9 °F (37.2 °C)] 98.9 °F (37.2 °C)  Pulse:  [] 84  Resp:  [12-20] 20  SpO2:  [95 %-100 %] 96 %  BP: ()/(52-66) 105/56     Weight: 93 kg (205 lb)  Body mass index is 45.96 kg/m².    Intake/Output Summary (Last 24 hours) at 4/14/2022 0019  Last data filed at 4/13/2022 2210  Gross per 24 hour   Intake 1000 ml   Output --   Net 1000 ml      Physical Exam  Constitutional:       Appearance: Normal appearance. She is obese.   HENT:      Head:  Normocephalic and atraumatic.   Cardiovascular:      Rate and Rhythm: Tachycardia present.   Pulmonary:      Breath sounds: Normal breath sounds.   Abdominal:      General: There is distension.      Tenderness: There is abdominal tenderness (diffuse).   Neurological:      Mental Status: She is alert. Mental status is at baseline.         Significant Labs:   All pertinent labs within the past 24 hours have been reviewed.  Blood Culture: No results for input(s): LABBLOO in the last 48 hours.  CBC:   Recent Labs   Lab 04/13/22  1744   WBC 23.47*   HGB 15.5   HCT 47.2        CMP:   Recent Labs   Lab 04/13/22  1744      K 3.9      CO2 20*   GLU 96   BUN 8   CREATININE 1.0   CALCIUM 9.1   PROT 6.5   ALBUMIN 3.4*   BILITOT 1.0   ALKPHOS 83   AST 16   ALT 16   ANIONGAP 11   EGFRNONAA >60.0     Urine Culture: No results for input(s): LABURIN in the last 48 hours.  Urine Studies:   Recent Labs   Lab 04/13/22  1922   COLORU T[place]   APPEARANCEUA Clear   PHUR 5.0   SPECGRAV 1.030   PROTEINUA Negative   GLUCUA Negative   KETONESU Trace*   BILIRUBINUA Negative   OCCULTUA 1+*   NITRITE Negative   LEUKOCYTESUR Negative   RBCUA 11*   WBCUA 3   BACTERIA Rare   SQUAMEPITHEL 0       Significant Imaging: I have reviewed all pertinent imaging results/findings within the past 24 hours.    Medical Student Subjective & Objective    Aspiration pneumonia of left lower lobe due to gastric secretion  -CT findings correlate with aspiration pneumonia   -2/4 SIRS- tachycardic >90 and leukocytosis  -Blood cultures pending  -Procalcitonin 1.72  Lactic acid negative   -Moxifloxacin, vanc, and flagyl empirically initiated.   -Acetaminophen PRN for fever  -Repeat CBC     Coffee ground emesis  -NPO until GI   -phenergan and Zofran PRN for nausea  -PPI IV BID   -Consult GI  -Xray and CT scan displayed no perforation    Disease of thyroid gland  -Continue synthroid     B12 deficiency  -Continue Vitamin B12    Depressive  Disorder  -Continue lexapro

## 2022-04-14 NOTE — PROGRESS NOTES
[facility] [facility]  Progress Note    Patient Name: Mallory Abadie Bradbury  MRN: [id number]  Patient Class: OP- Observation   Admission Date: 4/13/2022  Length of Stay: 0 days  Attending Physician: Pamela Ruggiero MD  Primary Care Provider: Lizzie Ernst NP      Subjective:     Principal Problem:Aspiration pneumonia of left lower lobe due to gastric secretions      HPI:  Mallory Abadie Bradbury is a 31 y.o. female with down's syndrome, hypothyroidism, GERD, and esophageal stricture s/p EGD dilation today (Dr. Hernandez) who presented with fever >101, coffee ground emesis, and abdominal pain post procedure. History was obtained from patient's mother who was present at bedside. Patient was stable post procedure and reported no pain. Nausea and coffee ground emesis began shortly after eating scrambled eggs. Pt's temperature spiked to max 101.3. Tylenol was given to her with no improvement of symptoms. They notified the GI team who performed the EGD and were told to come to the ED for further eval. The patient complains of a sore throat and abdominal discomfort.     ED: Tachycardic 116. Hypotensive 89/52, improved with IV fluids. CBC with leukocytosis of 23.47. Lactic WNL. Procal 1.72. CT chest and abdomen findings correlate with aspirations pneumonia, negative for perforation. Patient started on moxifloxacin, vanc, and flagyl Given 40 IV protonix.       Interval History: no acute events overnight. Parents at bedside. POC discussed with GI.    Review of Systems   Constitutional:  Negative for activity change and fever.   Respiratory:  Negative for shortness of breath.    Cardiovascular:  Negative for chest pain.   Gastrointestinal:  Positive for abdominal pain.     Objective:     Vital Signs (Most Recent):  Temp: 98.5 °F (36.9 °C) (04/14/22 1531)  Pulse: 82 (04/14/22 1531)  Resp: 16 (04/14/22 1531)  BP: (!) 116/55 (04/14/22 1531)  SpO2: 97 % (04/14/22 1531)   Vital  Signs (24h Range):  Temp:  [97.6 °F (36.4 °C)-100.1 °F (37.8 °C)] 98.5 °F (36.9 °C)  Pulse:  [] 82  Resp:  [16-26] 16  SpO2:  [93 %-100 %] 97 %  BP: ()/(47-71) 116/55     Weight: 97.7 kg (215 lb 4.8 oz)  Body mass index is 48.27 kg/m².    Intake/Output Summary (Last 24 hours) at 4/14/2022 1710  Last data filed at 4/14/2022 1006  Gross per 24 hour   Intake 1010 ml   Output --   Net 1010 ml      Physical Exam  Vitals and nursing note reviewed.   Constitutional:       General: She is not in acute distress.     Appearance: She is morbidly obese.      Comments: Down's syndrome facies   HENT:      Head: Normocephalic and atraumatic.   Eyes:      Conjunctiva/sclera: Conjunctivae normal.   Neck:      Vascular: No JVD.   Cardiovascular:      Rate and Rhythm: Normal rate and regular rhythm.      Heart sounds: Murmur heard.   Pulmonary:      Effort: Pulmonary effort is normal.      Breath sounds: Normal breath sounds.   Abdominal:      General: Bowel sounds are normal. There is no distension.      Palpations: Abdomen is soft.   Musculoskeletal:      Cervical back: Neck supple.      Right lower leg: No edema.      Left lower leg: No edema.   Neurological:      Mental Status: She is alert.   Psychiatric:         Behavior: Behavior normal.       Significant Labs: All pertinent labs within the past 24 hours have been reviewed.  CBC:   Recent Labs   Lab 04/14/22  0311 04/14/22  0847 04/14/22  1351   WBC 22.87* 28.34* 24.09*   HGB 12.6 13.2 12.9   HCT 39.4 40.1 38.9    249 261     CMP:   Recent Labs   Lab 04/13/22  1744 04/14/22  0312    139   K 3.9 3.6    109   CO2 20* 20*   GLU 96 80   BUN 8 8   CREATININE 1.0 0.9   CALCIUM 9.1 8.4*   PROT 6.5  --    ALBUMIN 3.4*  --    BILITOT 1.0  --    ALKPHOS 83  --    AST 16  --    ALT 16  --    ANIONGAP 11 10   EGFRNONAA >60.0 >60.0       Significant Imaging: I have reviewed all pertinent imaging results/findings within the past 24 hours.      Assessment/Plan:       * Aspiration pneumonia of left lower lobe due to gastric secretions  Sepsis   - 2/4 SIRS , leukocytosis 23.47  - procal 1.72, lactic WNL  - CT chest with patchy consolidative change in the left lower lobe with additional scattered ground-glass opacities throughout the left lung.  - f/u blood and sputum cultures  - Continue Levaquin based on patient's allergies. An IV PCN/ flagyl regimen would have been ideal       Coffee ground emesis  -GI following:  - Monitor Hgb daily  - PPI PO daily indefinitely  - No plan for endoscopic interventions at this time  Discussed with Dr. Durand-- RUQ us requested       Stricture of esophagus  - s/p dilation 4/13  -continue PPI. Dysphagia diet.       Disease of thyroid gland  - continue synthroid       Depressive disorder  - continue lexapro 20 mg daily      VTE Risk Mitigation (From admission, onward)         Ordered     IP VTE HIGH RISK PATIENT  Once         04/13/22 2318     Place sequential compression device  Until discontinued         04/13/22 2318                Discharge Planning   BOO: 4/15/2022     Code Status: Full Code   Is the patient medically ready for discharge?:     Reason for patient still in hospital (select all that apply): Patient trending condition and Consult recommendations  Discharge Plan A: Home          Pamela Ruggiero MD  Department of Hospital Medicine   [facility] - Tele[facility]down ([place])

## 2022-04-14 NOTE — CARE UPDATE
RAPID RESPONSE NURSE ROUND       Rounding completed with charge RN, Kolby. No concerns verbalized at this time. Instructed to call [phone] for further concerns or assistance.

## 2022-04-14 NOTE — PROVIDER PROGRESS NOTES - EMERGENCY DEPT.
Encounter Date: 4/13/2022    ED Physician Progress Notes        10:07 PM  Patient now vomiting- coffee grounds emesis  Given zofran 4 mg    Required additional meds- phenergn 12.5 mg iv ordered  Patient admitted to hospital medicine  Gi consulted    Final diagnoses:  [R00.0] Tachycardia  [Z98.890, Z87.19] Status post dilation of esophageal narrowing  [K92.0] Coffee ground emesis  [R50.9] Fever, unspecified fever cause  [J69.0] Aspiration pneumonia of left lower lobe due to gastric secretions (Primary)

## 2022-04-14 NOTE — NURSING
Pt hypotensive in 70/40s, Rechecked manually per protocol. Temp 99.6 at 0420. 100.1 at 0449 Blood sugar 109. Charge nurse notified. Rapid response team called.

## 2022-04-14 NOTE — CONSULTS
[facility]  Gastroenterology  Consult Note    Patient Name: Mallory Abadie Bradbury  MRN: [id number]  Admission Date: 4/13/2022  Hospital Length of Stay: 0 days  Code Status: Full Code   Attending Provider: Pamela Ruggiero MD   Consulting Provider: Charline Chino MD  Primary Care Physician: Lizzie Ernst NP  Principal Problem:Aspiration pneumonia of left lower lobe due to gastric secretions    Inpatient consult to Gastroenterology  Consult performed by: Charline Chino MD  Consult ordered by: Gladys Nava PA-C    Inpatient consult to Gastroenterology  Consult performed by: Charline Chino MD  Consult ordered by: Colleen Summers MD        Subjective:     HPI: Mallory Abadie Bradbury is a 31 y.o. female with history of down's syndrome, hypothyroidism, GERD, esophageal stenosis s/p dialtions in the past who presents for fever and coffee ground emesis.  The patient underwent an elective EGD yesterday for evaluation of dysphagia and underwent a dilation of her GE junction up to 18 mm, no obvious stenosis was seen if there was no mucosal break post dilation.  The patient was intubated for the procedure.  A few hours after the procedure she developed a fever and an episode of coffee-ground emesis.  She also reported some right upper quadrant abdominal pain.  She was brought to the hospital by her parents from whom the history was obtained.  They report that she has been doing better since admission after receiving fluids.  She continues to complain of some abdominal pain, although does not appear to be in distress.  On admission she was tachycardic and hypotensive, CBC with leukocytosis, imaging showing aspiration pneumonia.  She was started on antibiotics, IV PPI and admitted to the hospital.        Past Medical History:   Diagnosis Date    Difficult intubation     per dad     Down's syndrome     Esophageal stenosis     GERD (gastroesophageal reflux disease)      Hiatal hernia     Thyroid disease        Past Surgical History:   Procedure Laterality Date    APPENDECTOMY      COLONOSCOPY N/A 8/28/2020    Procedure: COLONOSCOPY;  Surgeon: Domingo Durand MD;  Location: [facility] (2ND FLR);  Service: Endoscopy;  Laterality: N/A;  Please schedule in the 2nd floor for airway protection body habitus down syndrome     COVID test at [place] on 8/25/20-GT    ESOPHAGOGASTRODUODENOSCOPY N/A 1/27/2020    Procedure: EGD (ESOPHAGOGASTRODUODENOSCOPY);  Surgeon: Violette Garrett MD;  Location: [facility];  Service: Endoscopy;  Laterality: N/A;    ESOPHAGOGASTRODUODENOSCOPY N/A 7/14/2020    Procedure: EGD (ESOPHAGOGASTRODUODENOSCOPY);  Surgeon: Domingo Durand MD;  Location: [facility] (2ND FLR);  Service: Endoscopy;  Laterality: N/A;  Urgent EGD 2nd floor for airway protection due to difficult intubation airway anatomy for esophageal dilation.  Please schedule her next week 2nd floor for dysphagia.     COVID test at [place] on 7/11/20-GT    ESOPHAGOGASTRODUODENOSCOPY N/A 8/28/2020    Procedure: EGD (ESOPHAGOGASTRODUODENOSCOPY);  Surgeon: Domingo Durand MD;  Location: [facility] (2ND FLR);  Service: Endoscopy;  Laterality: N/A;  per Dr Kiser-add EGD to colonoscopy order  8/26-pt's mother confirmed appt-MS    ESOPHAGOGASTRODUODENOSCOPY N/A 2/9/2021    Procedure: EGD (ESOPHAGOGASTRODUODENOSCOPY);  Surgeon: Domingo Durand MD;  Location: [facility] (2ND FLR);  Service: Endoscopy;  Laterality: N/A;  Please schedule patient 2nd floor 1st available provider for esophageal dysphagia and history of it difficult intubation in airway per her father.  Patient with Down syndrome is here with her father he states that she has been having trouble swallowing    ESOPHAGOGASTRODUODENOSCOPY N/A 6/29/2021    Procedure: EGD (ESOPHAGOGASTRODUODENOSCOPY);  Surgeon: Ryan Floyd MD;  Location: [facility] (2ND FLR);  Service: Endoscopy;  Laterality: N/A;  dysphagia needs dialation     Please  schedule patient 2nd floor 1st available provider for esophageal dysphagia and history of it difficult intubation in airway per her father.  Patient with Down syndrome. mother requesting ASAP  fully vaccinated-GT    ESOPHAGOGASTRODUODENOSCOPY N/A 4/13/2022    Procedure: EGD (ESOPHAGOGASTRODUODENOSCOPY);  Surgeon: Domingo Durand MD;  Location: [address]);  Service: Endoscopy;  Laterality: N/A;  Dysphagia please evaluate for esophageal dilation     Please schedule patient 2nd floor 1st available provider for esophageal dysphagia and history of it difficult intubation in airway per her father.  Patient with Down syndrome. mother requesting ASAP    esposgus      TONGUE SURGERY      TONSILLECTOMY         Family History   Problem Relation Age of Onset    Colon cancer Paternal Grandfather 60    Esophageal cancer Neg Hx     Celiac disease Neg Hx     Cirrhosis Neg Hx     Colon polyps Neg Hx        Social History     Socioeconomic History    Marital status: Single   Tobacco Use    Smoking status: Never Smoker    Smokeless tobacco: Never Used   Substance and Sexual Activity    Alcohol use: No    Drug use: Never    Sexual activity: Never   Social History Narrative    Patient with Down syndrome       Current Facility-Administered Medications on File Prior to Encounter   Medication Dose Route Frequency Provider Last Rate Last Admin    [DISCONTINUED] 0.9%  NaCl infusion   Intravenous Continuous Domingo Durand MD   Stopped at 04/13/22 0840    [DISCONTINUED] sodium chloride 0.9% flush 10 mL  10 mL Intravenous PRN Adriano Valdovinos MD         Current Outpatient Medications on File Prior to Encounter   Medication Sig Dispense Refill    budesonide 1 mg/2 mL NbSp budesonide 2 mg/day, usually in divided dose of 1 mg/2mL budesonide mixed with 5 g sucralose orally for 8 weeks. (Patient not taking: No sig reported) 100 mL 0    cetirizine (ZYRTEC) 1 mg/mL syrup Take by mouth once daily.      cyanocobalamin  (VITAMIN B-12) 1000 MCG tablet cyanocobalamin (vit B-12) 1,000 mcg tablet   Take 1 tablet every day by oral route for 90 days.      doxycycline hyclate 50 mg Tab Take by mouth.      escitalopram oxalate (LEXAPRO) 20 MG tablet Take 1 tablet by mouth once daily.      estradiol cypionate (DEPO-ESTRADIOL) 5 mg/mL injection Inject into the muscle every 28 days.      ferrous sulfate (FEOSOL) 325 mg (65 mg iron) Tab tablet Take 1 tablet (325 mg total) by mouth every 12 (twelve) hours. 60 tablet 4    fluconazole (DIFLUCAN) 150 MG Tab fluconazole 150 mg tablet      folic acid (FOLVITE) 1 MG tablet TAKE 1 TABLET(1 MG) BY MOUTH EVERY DAY 30 tablet 11    hydrocortisone 2.5 % cream EMEKA SML AMT EXT AA BID  1    levothyroxine (SYNTHROID) 75 MCG tablet Take 70 mcg by mouth every Tuesday, Thursday, Saturday, Sunday.      levothyroxine (SYNTHROID) 88 MCG tablet Take 88 mcg by mouth every Mon, Wed, Fri, Sun.      medroxyPROGESTERone (DEPO-PROVERA) 150 mg/mL injection   2    minocycline (MINOCIN,DYNACIN) 100 MG capsule minocycline 100 mg capsule      mupirocin calcium 2% nasl oint (BACTROBAN NASAL) 2 % Oint mupirocin 2 % topical ointment   APPLY A SMALL AMOUNT TO THE AFFECTED AREA BY TOPICAL ROUTE 3 TIMES PER DAY      pantoprazole (PROTONIX) 40 MG tablet Take 1 tablet (40 mg total) by mouth before breakfast. 90 tablet 3       Review of patient's allergies indicates:   Allergen Reactions    Amoxicillin Hives    Augmentin [amoxicillin-pot clavulanate] Rash    Bactrim [sulfamethoxazole-trimethoprim] Rash    Duricef [cefadroxil] Rash    Keflex [cephalexin] Rash    Rifampin Rash       Review of Systems   Unable to perform ROS: Mental acuity        Objective:     Vitals:    04/14/22 0815   BP: (!) 112/53   Pulse: 87   Resp: 16   Temp: 98 °F (36.7 °C)         Constitutional:  not in acute distress and well developed  HENT: Head: Normal, normocephalic, atraumatic.  Eyes: conjunctiva clear and sclera  nonicteric  Cardiovascular: regular rate and rhythm  Respiratory: normal chest expansion & respiratory effort   and no accessory muscle use  GI: soft, tenderness to palpation in the rUQ  Musculoskeletal: no muscular tenderness noted  Skin: normal color  Neurological: alert  Psychiatric: normal mood    Significant Labs:  Recent Labs   Lab 04/13/22  1744 04/14/22  0311 04/14/22  0847   HGB 15.5 12.6 13.2       Lab Results   Component Value Date    WBC 28.34 (H) 04/14/2022    HGB 13.2 04/14/2022    HCT 40.1 04/14/2022    MCV 99 (H) 04/14/2022     04/14/2022       Lab Results   Component Value Date     04/14/2022    K 3.6 04/14/2022     04/14/2022    CO2 20 (L) 04/14/2022    BUN 8 04/14/2022    CREATININE 0.9 04/14/2022    CALCIUM 8.4 (L) 04/14/2022    ANIONGAP 10 04/14/2022    ESTGFRAFRICA >60.0 04/14/2022    EGFRNONAA >60.0 04/14/2022       Lab Results   Component Value Date    ALT 16 04/13/2022    AST 16 04/13/2022    ALKPHOS 83 04/13/2022    BILITOT 1.0 04/13/2022       Lab Results   Component Value Date    INR 1.0 08/18/2020       Significant Imaging:  Reviewed pertinent radiology findings.       Assessment/Plan:     Mallory Abadie Bradbury is a 31 y.o. female with history of down's syndrome, hypothyroidism, GERD, esophageal stenosis s/p dilations in the past who presents for fever and coffee ground emesis.    Problem List:  1. Aspiration pneumonia  2. Coffee ground emesis    The patient presents with fever and sepsis likely from aspiration pneumonia after EGD.  She is improving on antibiotics and fluids at this time.  Coffee-ground emesis can be expected after retching/esophagitis.  Hgb drop is likely dilutional.  No plan for any endoscopic interventions at this time.  Recommend treatment for aspiration pneumonia.    Recommendations:  - aspiration pneumonia management per primary team  - Monitor Hgb daily  - PPI PO daily indefinitely  - No plan for endoscopic interventions at this time    Thank  you for involving us in the care of Mallory Abadie Bradbury. Please call with any additional questions, concerns or changes in the patient's clinical status. We will continue to follow.     Charline Chino MD  Gastroenterology Fellow PGY V  [facility]

## 2022-04-14 NOTE — SUBJECTIVE & OBJECTIVE
Interval History: no acute events overnight. Parents at bedside. POC discussed with GI.    Review of Systems   Constitutional:  Negative for activity change and fever.   Respiratory:  Negative for shortness of breath.    Cardiovascular:  Negative for chest pain.   Gastrointestinal:  Positive for abdominal pain.     Objective:     Vital Signs (Most Recent):  Temp: 98.5 °F (36.9 °C) (04/14/22 1531)  Pulse: 82 (04/14/22 1531)  Resp: 16 (04/14/22 1531)  BP: (!) 116/55 (04/14/22 1531)  SpO2: 97 % (04/14/22 1531)   Vital Signs (24h Range):  Temp:  [97.6 °F (36.4 °C)-100.1 °F (37.8 °C)] 98.5 °F (36.9 °C)  Pulse:  [] 82  Resp:  [16-26] 16  SpO2:  [93 %-100 %] 97 %  BP: ()/(47-71) 116/55     Weight: 97.7 kg (215 lb 4.8 oz)  Body mass index is 48.27 kg/m².    Intake/Output Summary (Last 24 hours) at 4/14/2022 1710  Last data filed at 4/14/2022 1006  Gross per 24 hour   Intake 1010 ml   Output --   Net 1010 ml      Physical Exam  Vitals and nursing note reviewed.   Constitutional:       General: She is not in acute distress.     Appearance: She is morbidly obese.      Comments: Down's syndrome facies   HENT:      Head: Normocephalic and atraumatic.   Eyes:      Conjunctiva/sclera: Conjunctivae normal.   Neck:      Vascular: No JVD.   Cardiovascular:      Rate and Rhythm: Normal rate and regular rhythm.      Heart sounds: Murmur heard.   Pulmonary:      Effort: Pulmonary effort is normal.      Breath sounds: Normal breath sounds.   Abdominal:      General: Bowel sounds are normal. There is no distension.      Palpations: Abdomen is soft.   Musculoskeletal:      Cervical back: Neck supple.      Right lower leg: No edema.      Left lower leg: No edema.   Neurological:      Mental Status: She is alert.   Psychiatric:         Behavior: Behavior normal.       Significant Labs: All pertinent labs within the past 24 hours have been reviewed.  CBC:   Recent Labs   Lab 04/14/22  0311 04/14/22  0847 04/14/22  1351   WBC  22.87* 28.34* 24.09*   HGB 12.6 13.2 12.9   HCT 39.4 40.1 38.9    249 261     CMP:   Recent Labs   Lab 04/13/22  1744 04/14/22  0312    139   K 3.9 3.6    109   CO2 20* 20*   GLU 96 80   BUN 8 8   CREATININE 1.0 0.9   CALCIUM 9.1 8.4*   PROT 6.5  --    ALBUMIN 3.4*  --    BILITOT 1.0  --    ALKPHOS 83  --    AST 16  --    ALT 16  --    ANIONGAP 11 10   EGFRNONAA >60.0 >60.0       Significant Imaging: I have reviewed all pertinent imaging results/findings within the past 24 hours.

## 2022-04-14 NOTE — ASSESSMENT & PLAN NOTE
- 2/4 SIRS , leukocytosis 23.47  - procal 1.72, lactic WNL  - CT chest with patchy consolidative change in the left lower lobe with additional scattered ground-glass opacities throughout the left lung.  - f/u blood and sputum cultures  - started on vanc, flagyl, and moxifloxocin in ED, will continue  - aspiration precautions

## 2022-04-15 VITALS
DIASTOLIC BLOOD PRESSURE: 61 MMHG | WEIGHT: 215.31 LBS | TEMPERATURE: 98 F | RESPIRATION RATE: 23 BRPM | HEART RATE: 82 BPM | BODY MASS INDEX: 48.43 KG/M2 | SYSTOLIC BLOOD PRESSURE: 104 MMHG | HEIGHT: 56 IN | OXYGEN SATURATION: 99 %

## 2022-04-15 LAB
ANION GAP SERPL CALC-SCNC: 9 MMOL/L (ref 8–16)
BASOPHILS # BLD AUTO: 0.05 K/UL (ref 0–0.2)
BASOPHILS NFR BLD: 0.4 % (ref 0–1.9)
BUN SERPL-MCNC: 5 MG/DL (ref 6–20)
CALCIUM SERPL-MCNC: 8.6 MG/DL (ref 8.7–10.5)
CHLORIDE SERPL-SCNC: 111 MMOL/L (ref 95–110)
CO2 SERPL-SCNC: 20 MMOL/L (ref 23–29)
CREAT SERPL-MCNC: 1 MG/DL (ref 0.5–1.4)
DIFFERENTIAL METHOD: ABNORMAL
EOSINOPHIL # BLD AUTO: 0.2 K/UL (ref 0–0.5)
EOSINOPHIL NFR BLD: 1.2 % (ref 0–8)
ERYTHROCYTE [DISTWIDTH] IN BLOOD BY AUTOMATED COUNT: 14.6 % (ref 11.5–14.5)
EST. GFR  (AFRICAN AMERICAN): >60 ML/MIN/1.73 M^2
EST. GFR  (NON AFRICAN AMERICAN): >60 ML/MIN/1.73 M^2
GLUCOSE SERPL-MCNC: 100 MG/DL (ref 70–110)
HCT VFR BLD AUTO: 38 % (ref 37–48.5)
HGB BLD-MCNC: 12.6 G/DL (ref 12–16)
IMM GRANULOCYTES # BLD AUTO: 0.09 K/UL (ref 0–0.04)
IMM GRANULOCYTES NFR BLD AUTO: 0.7 % (ref 0–0.5)
LYMPHOCYTES # BLD AUTO: 2.3 K/UL (ref 1–4.8)
LYMPHOCYTES NFR BLD: 18.3 % (ref 18–48)
MAGNESIUM SERPL-MCNC: 1.9 MG/DL (ref 1.6–2.6)
MCH RBC QN AUTO: 31.7 PG (ref 27–31)
MCHC RBC AUTO-ENTMCNC: 33.2 G/DL (ref 32–36)
MCV RBC AUTO: 96 FL (ref 82–98)
MONOCYTES # BLD AUTO: 0.4 K/UL (ref 0.3–1)
MONOCYTES NFR BLD: 3.4 % (ref 4–15)
NEUTROPHILS # BLD AUTO: 9.5 K/UL (ref 1.8–7.7)
NEUTROPHILS NFR BLD: 76 % (ref 38–73)
NRBC BLD-RTO: 0 /100 WBC
PHOSPHATE SERPL-MCNC: 2.8 MG/DL (ref 2.7–4.5)
PLATELET # BLD AUTO: 242 K/UL (ref 150–450)
PMV BLD AUTO: 10.4 FL (ref 9.2–12.9)
POTASSIUM SERPL-SCNC: 3.7 MMOL/L (ref 3.5–5.1)
RBC # BLD AUTO: 3.98 M/UL (ref 4–5.4)
SODIUM SERPL-SCNC: 140 MMOL/L (ref 136–145)
WBC # BLD AUTO: 12.52 K/UL (ref 3.9–12.7)

## 2022-04-15 PROCEDURE — 85025 COMPLETE CBC W/AUTO DIFF WBC: CPT | Performed by: PHYSICIAN ASSISTANT

## 2022-04-15 PROCEDURE — 83735 ASSAY OF MAGNESIUM: CPT | Performed by: PHYSICIAN ASSISTANT

## 2022-04-15 PROCEDURE — C9113 INJ PANTOPRAZOLE SODIUM, VIA: HCPCS | Performed by: PHYSICIAN ASSISTANT

## 2022-04-15 PROCEDURE — 25000003 PHARM REV CODE 250: Performed by: PHYSICIAN ASSISTANT

## 2022-04-15 PROCEDURE — 94761 N-INVAS EAR/PLS OXIMETRY MLT: CPT

## 2022-04-15 PROCEDURE — 99225 PR SUBSEQUENT OBSERVATION CARE,LEVEL II: ICD-10-PCS | Mod: ,,, | Performed by: FAMILY MEDICINE

## 2022-04-15 PROCEDURE — 96376 TX/PRO/DX INJ SAME DRUG ADON: CPT | Performed by: EMERGENCY MEDICINE

## 2022-04-15 PROCEDURE — 63600175 PHARM REV CODE 636 W HCPCS: Performed by: PHYSICIAN ASSISTANT

## 2022-04-15 PROCEDURE — 36415 COLL VENOUS BLD VENIPUNCTURE: CPT | Performed by: PHYSICIAN ASSISTANT

## 2022-04-15 PROCEDURE — 80048 BASIC METABOLIC PNL TOTAL CA: CPT | Performed by: PHYSICIAN ASSISTANT

## 2022-04-15 PROCEDURE — 84100 ASSAY OF PHOSPHORUS: CPT | Performed by: PHYSICIAN ASSISTANT

## 2022-04-15 PROCEDURE — G0378 HOSPITAL OBSERVATION PER HR: HCPCS

## 2022-04-15 PROCEDURE — 99225 PR SUBSEQUENT OBSERVATION CARE,LEVEL II: CPT | Mod: ,,, | Performed by: FAMILY MEDICINE

## 2022-04-15 PROCEDURE — A4216 STERILE WATER/SALINE, 10 ML: HCPCS | Performed by: PHYSICIAN ASSISTANT

## 2022-04-15 RX ORDER — LEVOTHYROXINE SODIUM 75 UG/1
75 TABLET ORAL
Qty: 16 TABLET | Refills: 11 | COMMUNITY
Start: 2022-04-16

## 2022-04-15 RX ORDER — LEVOTHYROXINE SODIUM 88 UG/1
88 TABLET ORAL
Qty: 12 TABLET | Refills: 11 | COMMUNITY
Start: 2022-04-15

## 2022-04-15 RX ORDER — LEVOFLOXACIN 750 MG/1
750 TABLET ORAL DAILY
Qty: 3 TABLET | Refills: 0 | Status: SHIPPED | OUTPATIENT
Start: 2022-04-16 | End: 2022-04-19

## 2022-04-15 RX ADMIN — Medication 10 ML: at 09:04

## 2022-04-15 RX ADMIN — LEVOFLOXACIN 750 MG: 750 INJECTION, SOLUTION INTRAVENOUS at 11:04

## 2022-04-15 RX ADMIN — PANTOPRAZOLE SODIUM 40 MG: 40 INJECTION, POWDER, FOR SOLUTION INTRAVENOUS at 09:04

## 2022-04-15 RX ADMIN — ESCITALOPRAM OXALATE 20 MG: 20 TABLET ORAL at 09:04

## 2022-04-15 NOTE — PLAN OF CARE
Problem: Fluid Imbalance (Pneumonia)  Goal: Fluid Balance  Outcome: Ongoing, Progressing     Problem: Adult Inpatient Plan of Care  Goal: Plan of Care Review  Outcome: Ongoing, Progressing     Problem: Adult Inpatient Plan of Care  Goal: Optimal Comfort and Wellbeing  Outcome: Ongoing, Progressing     Problem: Nutrition Impaired (Sepsis/Septic Shock)  Goal: Optimal Nutrition Intake  Outcome: Ongoing, Progressing

## 2022-04-15 NOTE — DISCHARGE SUMMARY
[facility] [address]  [facility] Medicine  Discharge Summary      Patient Name: Mallory Abadie Bradbury  MRN: [id number]  Patient Class: OP- Observation  Admission Date: 4/13/2022  Hospital Length of Stay: 0 days  Discharge Date and Time: 4/15/2022  1:20 PM  Discharging Provider: Gagan Ruggiero MD  Primary Care Provider: Lizzie Ernst NP  Hospital Medicine Team: [facility] HOSP MED N Gagan Ruggiero MD    HPI:   Mallory Abadie Bradbury is a 31 y.o. female with down's syndrome, hypothyroidism, GERD, and esophageal stricture s/p EGD dilation today (Dr. Hernandez) who presented with fever >101, coffee ground emesis, and abdominal pain post procedure. History was obtained from patient's mother who was present at bedside. Patient was stable post procedure and reported no pain. Nausea and coffee ground emesis began shortly after eating scrambled eggs. Pt's temperature spiked to max 101.3. Tylenol was given to her with no improvement of symptoms. They notified the GI team who performed the EGD and were told to come to the ED for further eval. The patient complains of a sore throat and abdominal discomfort.     ED: Tachycardic 116. Hypotensive 89/52, improved with IV fluids. CBC with leukocytosis of 23.47. Lactic WNL. Procal 1.72. CT chest and abdomen findings correlate with aspirations pneumonia, negative for perforation. Patient started on moxifloxacin, vanc, and flagyl Given 40 IV protonix.       Hospital Course:   GI was consulted. CTAP and liver u/s without any acute findings. Diet was resumed. She was treated with abx for aspiration pneumonia. She remained hemodynamically stable and was cleared for discharge.        Consults:   Consults (From admission, onward)        Status Ordering Provider     Inpatient consult to PICC team ([facility]AS)  Once        Provider:  (Not yet assigned)    GAGAN Pro     Inpatient consult to Gastroenterology  Once        Provider:  (Not yet assigned)     Completed NANCY HERNANDEZ     Inpatient consult to Gastroenterology  Once        Provider:  (Not yet assigned)    Completed REANNA RAMIRES          * Aspiration pneumonia of left lower lobe due to gastric secretions  Sepsis   - Continue Levaquin based on patient's allergies.      Coffee ground emesis  - PPI PO daily indefinitely  - No plan for endoscopic interventions at this time     Stricture of esophagus  - s/p dilation 4/13  -continue PPI. Dysphagia diet.     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    Specialty: Family Medicine  Contact information:  [address]  [address] [place] [id number] [phone]                       Patient Instructions:      Diet Adult Regular     Activity as tolerated       Significant Diagnostic Studies: Labs:   CMP   Recent Labs   Lab 04/13/22  1744 04/14/22  0312 04/15/22  0551    139 140   K 3.9 3.6 3.7    109 111*   CO2 20* 20* 20*   GLU 96 80 100   BUN 8 8 5*   CREATININE 1.0 0.9 1.0   CALCIUM 9.1 8.4* 8.6*   PROT 6.5  --   --    ALBUMIN 3.4*  --   --     BILITOT 1.0  --   --    ALKPHOS 83  --   --    AST 16  --   --    ALT 16  --   --    ANIONGAP 11 10 9   ESTGFRAFRICA >60.0 >60.0 >60.0   EGFRNONAA >60.0 >60.0 >60.0    and CBC   Recent Labs   Lab 04/14/22  1351 04/14/22  1911 04/15/22  0551   WBC 24.09* 19.90* 12.52   HGB 12.9 12.7 12.6   HCT 38.9 38.5 38.0    239 242     Microbiology:   Blood Culture   Lab Results   Component Value Date    LABBLOO No Growth to date 04/13/2022    LABBLOO No Growth to date 04/13/2022       Pending Diagnostic Studies:     None         Medications:.  Reconciled Home Medications:      Medication List      START taking these medications    levoFLOXacin 750 MG tablet  Commonly known as: LEVAQUIN  Take 1 tablet (750 mg total) by mouth once daily. for 3 days  Start taking on: April 16, 2022        CHANGE how you take these medications    * levothyroxine 88 MCG tablet  Commonly known as: SYNTHROID  Take 1 tablet (88 mcg total) by mouth every Mon, Wed, Fri.  What changed: when to take this     * levothyroxine 75 MCG tablet  Commonly known as: SYNTHROID  Take 1 tablet (75 mcg total) by mouth every Tuesday, Thursday, Saturday, Sunday.  Start taking on: April 16, 2022  What changed: how much to take         * This list has 2 medication(s) that are the same as other medications prescribed for you. Read the directions carefully, and ask your doctor or other care provider to review them with you.            CONTINUE taking these medications    Bactroban NasaL 2 % Oint  Generic drug: mupirocin calcium 2% nasl oint  mupirocin 2 % topical ointment   APPLY A SMALL AMOUNT TO THE AFFECTED AREA BY TOPICAL ROUTE 3 TIMES PER DAY     cyanocobalamin 1000 MCG tablet  Commonly known as: VITAMIN B-12  cyanocobalamin (vit B-12) 1,000 mcg tablet   Take 1 tablet every day by oral route for 90 days.     doxycycline hyclate 50 mg Tab  Take by mouth.     EScitalopram oxalate 20 MG tablet  Commonly known as: LEXAPRO  Take 1 tablet by mouth once daily.      hydrocortisone 2.5 % cream  EMEKA SML AMT EXT AA BID     medroxyPROGESTERone 150 mg/mL injection  Commonly known as: DEPO-PROVERA        STOP taking these medications    budesonide 1 mg/2 mL Nbsp     cetirizine 1 mg/mL syrup  Commonly known as: ZYRTEC     estradiol cypionate 5 mg/mL injection  Commonly known as: DEPO-ESTRADIOL     ferrous sulfate 325 mg (65 mg iron) Tab tablet  Commonly known as: FEOSOL     fluconazole 150 MG Tab  Commonly known as: DIFLUCAN     folic acid 1 MG tablet  Commonly known as: FOLVITE     minocycline 100 MG capsule  Commonly known as: MINOCIN,DYNACIN     pantoprazole 40 MG tablet  Commonly known as: PROTONIX            Indwelling Lines/Drains at time of discharge:   Lines/Drains/Airways     None                 Time spent on the discharge of patient: 33 minutes   Patient examined at bedside on day of discharge. Exam findings stable. She was deemed safe for discharge.       Pamela Ruggiero MD  Department of Hospital Medicine  [facility] - Telemetry Stepdown ([place]

## 2022-04-15 NOTE — PLAN OF CARE
Problem: Infection  Goal: Absence of Infection Signs and Symptoms  Outcome: Met     Problem: Adult Inpatient Plan of Care  Goal: Plan of Care Review  Outcome: Met  Goal: Patient-Specific Goal (Individualized)  Outcome: Met  Goal: Absence of Hospital-Acquired Illness or Injury  Outcome: Met  Goal: Optimal Comfort and Wellbeing  Outcome: Met  Goal: Readiness for Transition of Care  Outcome: Met     Problem: Bariatric Environmental Safety  Goal: Safety Maintained with Care  Outcome: Met     Problem: Adjustment to Illness (Gastrointestinal Bleeding)  Goal: Optimal Coping with Acute Illness  Outcome: Met     Problem: Bleeding (Gastrointestinal Bleeding)  Goal: Hemostasis  Outcome: Met     Problem: Fluid Imbalance (Pneumonia)  Goal: Fluid Balance  Outcome: Met     Problem: Infection (Pneumonia)  Goal: Resolution of Infection Signs and Symptoms  Outcome: Met     Problem: Respiratory Compromise (Pneumonia)  Goal: Effective Oxygenation and Ventilation  Outcome: Met     Problem: Adjustment to Illness (Sepsis/Septic Shock)  Goal: Optimal Coping  Outcome: Met     Problem: Bleeding (Sepsis/Septic Shock)  Goal: Absence of Bleeding  Outcome: Met     Problem: Glycemic Control Impaired (Sepsis/Septic Shock)  Goal: Blood Glucose Level Within Desired Range  Outcome: Met     Problem: Infection Progression (Sepsis/Septic Shock)  Goal: Absence of Infection Signs and Symptoms  Outcome: Met     Problem: Nutrition Impaired (Sepsis/Septic Shock)  Goal: Optimal Nutrition Intake  Outcome: Met     Pt sitting in recliner chair at bedside with eyes open, watching TV at present with parents at bedside. No c/o pain noted. Pt alert x3 but requires reminders at times. Continent of B/B and requires assistance x1 with ADLs. Family verbalizes understanding of discharge instructions and to f/u with PCP after discharge.

## 2022-04-15 NOTE — PROGRESS NOTES
GI Treatment Plan    Mallory Abadie Bradbury is a 31 y.o. female admitted to hospital 4/13/2022 (Hospital Day: 3) due to Aspiration pneumonia of left lower lobe due to gastric secretions.     Interval History  Hgb remains stable. Slightly hypotensive but BP around baseline per family.    Objective  Temp:  [97.7 °F (36.5 °C)-98.6 °F (37 °C)] 97.7 °F (36.5 °C) (04/15 0520)  Pulse:  [] 85 (04/15 0308)  BP: ()/(51-71) 90/51 (04/15 0520)  Resp:  [16] 16 (04/14 2339)  SpO2:  [94 %-98 %] 98 % (04/14 2339)      Laboratory    Recent Labs   Lab 04/14/22  0847 04/14/22  1351 04/14/22  1911   HGB 13.2 12.9 12.7         Assessment and Plan  - no endoscopic interventions planned at this time  - treatment for aspiration pneumonia per primary team  - continue daily PPI  - We are signing-off. Please call with any questions.    Thank you for involving us in the care of Mallory Abadie Bradbury. Please call with any additional questions, concerns or changes in the patient's clinical status.    Charline Chino MD  Gastroenterology Fellow, PGY V

## 2022-04-15 NOTE — HOSPITAL COURSE
GI was consulted. CTAP and liver u/s without any acute findings. Diet was resumed. She was treated with abx for aspiration pneumonia. She remained hemodynamically stable and was cleared for discharge.

## 2022-04-18 LAB
BACTERIA BLD CULT: NORMAL
BACTERIA BLD CULT: NORMAL

## 2022-04-20 ENCOUNTER — PATIENT MESSAGE (OUTPATIENT)
Dept: GASTROENTEROLOGY | Facility: CLINIC | Age: 32
End: 2022-04-20
Payer: MEDICAID

## 2022-04-21 DIAGNOSIS — K21.9 GASTROESOPHAGEAL REFLUX DISEASE, UNSPECIFIED WHETHER ESOPHAGITIS PRESENT: Primary | ICD-10-CM

## 2022-04-21 RX ORDER — OMEPRAZOLE 40 MG/1
40 CAPSULE, DELAYED RELEASE ORAL
Qty: 90 CAPSULE | Refills: 3 | Status: SHIPPED | OUTPATIENT
Start: 2022-04-21 | End: 2023-05-17

## 2022-05-06 ENCOUNTER — LAB VISIT (OUTPATIENT)
Dept: LAB | Facility: HOSPITAL | Age: 32
End: 2022-05-06
Attending: INTERNAL MEDICINE
Payer: MEDICAID

## 2022-05-06 DIAGNOSIS — E03.9 MYXEDEMA HEART DISEASE: Primary | ICD-10-CM

## 2022-05-06 DIAGNOSIS — I51.9 MYXEDEMA HEART DISEASE: Primary | ICD-10-CM

## 2022-05-06 LAB
T4 FREE SERPL-MCNC: 1.06 NG/DL (ref 0.71–1.51)
TSH SERPL DL<=0.005 MIU/L-ACNC: 0.96 UIU/ML (ref 0.4–4)

## 2022-05-06 PROCEDURE — 36415 COLL VENOUS BLD VENIPUNCTURE: CPT | Performed by: INTERNAL MEDICINE

## 2022-05-06 PROCEDURE — 84443 ASSAY THYROID STIM HORMONE: CPT | Performed by: INTERNAL MEDICINE

## 2022-05-06 PROCEDURE — 84439 ASSAY OF FREE THYROXINE: CPT | Performed by: INTERNAL MEDICINE

## 2022-06-20 ENCOUNTER — OFFICE VISIT (OUTPATIENT)
Dept: URGENT CARE | Facility: CLINIC | Age: 32
End: 2022-06-20
Payer: MEDICAID

## 2022-06-20 VITALS
TEMPERATURE: 97 F | OXYGEN SATURATION: 100 % | BODY MASS INDEX: 46.52 KG/M2 | WEIGHT: 201 LBS | HEART RATE: 69 BPM | HEIGHT: 55 IN | DIASTOLIC BLOOD PRESSURE: 48 MMHG | SYSTOLIC BLOOD PRESSURE: 95 MMHG | RESPIRATION RATE: 16 BRPM

## 2022-06-20 DIAGNOSIS — L03.90 CELLULITIS, UNSPECIFIED CELLULITIS SITE: ICD-10-CM

## 2022-06-20 DIAGNOSIS — L08.9 STAPH SKIN INFECTION: Primary | ICD-10-CM

## 2022-06-20 DIAGNOSIS — B95.8 STAPH SKIN INFECTION: Primary | ICD-10-CM

## 2022-06-20 PROCEDURE — 3074F PR MOST RECENT SYSTOLIC BLOOD PRESSURE < 130 MM HG: ICD-10-PCS | Mod: CPTII,S$GLB,, | Performed by: NURSE PRACTITIONER

## 2022-06-20 PROCEDURE — 99214 PR OFFICE/OUTPT VISIT, EST, LEVL IV, 30-39 MIN: ICD-10-PCS | Mod: S$GLB,,, | Performed by: NURSE PRACTITIONER

## 2022-06-20 PROCEDURE — 1159F MED LIST DOCD IN RCRD: CPT | Mod: CPTII,S$GLB,, | Performed by: NURSE PRACTITIONER

## 2022-06-20 PROCEDURE — 99214 OFFICE O/P EST MOD 30 MIN: CPT | Mod: S$GLB,,, | Performed by: NURSE PRACTITIONER

## 2022-06-20 PROCEDURE — 3074F SYST BP LT 130 MM HG: CPT | Mod: CPTII,S$GLB,, | Performed by: NURSE PRACTITIONER

## 2022-06-20 PROCEDURE — 1160F RVW MEDS BY RX/DR IN RCRD: CPT | Mod: CPTII,S$GLB,, | Performed by: NURSE PRACTITIONER

## 2022-06-20 PROCEDURE — 3078F PR MOST RECENT DIASTOLIC BLOOD PRESSURE < 80 MM HG: ICD-10-PCS | Mod: CPTII,S$GLB,, | Performed by: NURSE PRACTITIONER

## 2022-06-20 PROCEDURE — 3008F BODY MASS INDEX DOCD: CPT | Mod: CPTII,S$GLB,, | Performed by: NURSE PRACTITIONER

## 2022-06-20 PROCEDURE — 3008F PR BODY MASS INDEX (BMI) DOCUMENTED: ICD-10-PCS | Mod: CPTII,S$GLB,, | Performed by: NURSE PRACTITIONER

## 2022-06-20 PROCEDURE — 3078F DIAST BP <80 MM HG: CPT | Mod: CPTII,S$GLB,, | Performed by: NURSE PRACTITIONER

## 2022-06-20 PROCEDURE — 1160F PR REVIEW ALL MEDS BY PRESCRIBER/CLIN PHARMACIST DOCUMENTED: ICD-10-PCS | Mod: CPTII,S$GLB,, | Performed by: NURSE PRACTITIONER

## 2022-06-20 PROCEDURE — 1159F PR MEDICATION LIST DOCUMENTED IN MEDICAL RECORD: ICD-10-PCS | Mod: CPTII,S$GLB,, | Performed by: NURSE PRACTITIONER

## 2022-06-20 RX ORDER — CLINDAMYCIN HYDROCHLORIDE 300 MG/1
300 CAPSULE ORAL EVERY 8 HOURS
Qty: 21 CAPSULE | Refills: 0 | Status: ON HOLD | OUTPATIENT
Start: 2022-06-20 | End: 2023-10-01 | Stop reason: HOSPADM

## 2022-06-20 NOTE — PROGRESS NOTES
"Subjective:       Patient ID: Mallory Abadie Bradbury is a 32 y.o. female.    Vitals:  height is 4' 5" (1.346 m) and weight is 91.2 kg (201 lb). Her oral temperature is 97.3 °F (36.3 °C). Her blood pressure is 95/48 (abnormal) and her pulse is 69. Her respiration is 16 and oxygen saturation is 100%.     Chief Complaint: Recurrent Skin Infections    32 year old female accompanied by her mother with c/o painful lesions to bilateral base of her hair line. Patient has PMH of staph infections and has been taking doxycyline for over a month. Mom reports that the redness and pain are getting worse over the past 24 hours. She has tried mupirocin ointment and dial yellow soap.      Skin: Positive for lesion and erythema.       Objective:      Physical Exam   Constitutional: She is oriented to person, place, and time.   HENT:   Head: Normocephalic and atraumatic.   Nose: Nose normal.   Mouth/Throat: Oropharynx is clear.   Eyes: Conjunctivae are normal.   Neck: Neck supple.   Cardiovascular: Normal rate, regular rhythm, normal heart sounds and normal pulses.   Pulmonary/Chest: Effort normal and breath sounds normal.   Abdominal: Normal appearance. Soft.   Musculoskeletal: Normal range of motion.         General: Normal range of motion.   Neurological: She is alert and oriented to person, place, and time.   Skin: Capillary refill takes 2 to 3 seconds. erythema        Psychiatric: Her behavior is normal. Mood normal.   Nursing note and vitals reviewed.        Assessment:       1. Staph skin infection    2. Cellulitis, unspecified cellulitis site          Plan:         Staph skin infection  -     clindamycin (CLEOCIN) 300 MG capsule; Take 1 capsule (300 mg total) by mouth every 8 (eight) hours.  Dispense: 21 capsule; Refill: 0  -     CULTURE, AEROBIC  (SPECIFY SOURCE)    Cellulitis, unspecified cellulitis site  -     CULTURE, AEROBIC  (SPECIFY SOURCE)    Clindamycin 300mg every 8 hours with food  Discontinue doxycycline  Continue " mupirocin ointment and yellow dial soap  A culture was collected-we will call you with results  Follow up with PCP if symptoms persist or worsen

## 2022-06-20 NOTE — PATIENT INSTRUCTIONS
Clindamycin 300mg every 8 hours with food  Discontinue doxycycline  Continue mupirocin ointment and yellow dial soap  A culture was collected-we will call you with results

## 2022-06-25 LAB
BACTERIA SPEC CULT: NORMAL
MICROORGANISM/AGENT SPEC: NORMAL

## 2022-06-29 ENCOUNTER — TELEPHONE (OUTPATIENT)
Dept: URGENT CARE | Facility: CLINIC | Age: 32
End: 2022-06-29
Payer: MEDICAID

## 2022-06-29 NOTE — TELEPHONE ENCOUNTER
I called and reviewed skin culture results (normal patricia) with Danae's mother. She states that the cellulitis on her bilateral neck is improved since she started the antibiotics.

## 2023-03-01 ENCOUNTER — PATIENT MESSAGE (OUTPATIENT)
Dept: GASTROENTEROLOGY | Facility: CLINIC | Age: 33
End: 2023-03-01
Payer: MEDICAID

## 2023-03-03 DIAGNOSIS — K21.9 GASTROESOPHAGEAL REFLUX DISEASE, UNSPECIFIED WHETHER ESOPHAGITIS PRESENT: ICD-10-CM

## 2023-03-03 DIAGNOSIS — R13.19 ESOPHAGEAL DYSPHAGIA: Primary | ICD-10-CM

## 2023-03-03 DIAGNOSIS — Q90.9 COMPLETE TRISOMY 21 SYNDROME: ICD-10-CM

## 2023-03-07 ENCOUNTER — TELEPHONE (OUTPATIENT)
Dept: ENDOSCOPY | Facility: HOSPITAL | Age: 33
End: 2023-03-07
Payer: MEDICAID

## 2023-03-07 VITALS — HEIGHT: 55 IN | WEIGHT: 178 LBS | BODY MASS INDEX: 41.19 KG/M2

## 2023-03-07 DIAGNOSIS — K21.9 GASTROESOPHAGEAL REFLUX DISEASE, UNSPECIFIED WHETHER ESOPHAGITIS PRESENT: ICD-10-CM

## 2023-03-07 DIAGNOSIS — R13.19 ESOPHAGEAL DYSPHAGIA: Primary | ICD-10-CM

## 2023-03-07 DIAGNOSIS — Q90.9 COMPLETE TRISOMY 21 SYNDROME: ICD-10-CM

## 2023-03-09 ENCOUNTER — ANESTHESIA (OUTPATIENT)
Dept: ENDOSCOPY | Facility: HOSPITAL | Age: 33
End: 2023-03-09
Payer: MEDICAID

## 2023-03-09 ENCOUNTER — HOSPITAL ENCOUNTER (OUTPATIENT)
Facility: HOSPITAL | Age: 33
Discharge: HOME | End: 2023-03-09
Attending: INTERNAL MEDICINE | Admitting: INTERNAL MEDICINE
Payer: MEDICAID

## 2023-03-09 ENCOUNTER — ANESTHESIA EVENT (OUTPATIENT)
Dept: ENDOSCOPY | Facility: HOSPITAL | Age: 33
End: 2023-03-09
Payer: MEDICAID

## 2023-03-09 VITALS
DIASTOLIC BLOOD PRESSURE: 60 MMHG | RESPIRATION RATE: 13 BRPM | BODY MASS INDEX: 41.19 KG/M2 | SYSTOLIC BLOOD PRESSURE: 98 MMHG | HEART RATE: 60 BPM | WEIGHT: 178 LBS | OXYGEN SATURATION: 100 % | TEMPERATURE: 98 F | HEIGHT: 55 IN

## 2023-03-09 DIAGNOSIS — R13.19 ESOPHAGEAL DYSPHAGIA: ICD-10-CM

## 2023-03-09 LAB
B-HCG UR QL: NEGATIVE
CTP QC/QA: YES

## 2023-03-09 PROCEDURE — D9220A PRA ANESTHESIA: Mod: ANES,,, | Performed by: STUDENT IN AN ORGANIZED HEALTH CARE EDUCATION/TRAINING PROGRAM

## 2023-03-09 PROCEDURE — 43245 EGD DILATE STRICTURE: CPT | Mod: ,,, | Performed by: INTERNAL MEDICINE

## 2023-03-09 PROCEDURE — 25000003 PHARM REV CODE 250: Performed by: INTERNAL MEDICINE

## 2023-03-09 PROCEDURE — D9220A PRA ANESTHESIA: ICD-10-PCS | Mod: ANES,,, | Performed by: STUDENT IN AN ORGANIZED HEALTH CARE EDUCATION/TRAINING PROGRAM

## 2023-03-09 PROCEDURE — 63600175 PHARM REV CODE 636 W HCPCS: Performed by: NURSE ANESTHETIST, CERTIFIED REGISTERED

## 2023-03-09 PROCEDURE — D9220A PRA ANESTHESIA: ICD-10-PCS | Mod: CRNA,,, | Performed by: NURSE ANESTHETIST, CERTIFIED REGISTERED

## 2023-03-09 PROCEDURE — 00731 ANES UPR GI NDSC PX NOS: CPT | Performed by: INTERNAL MEDICINE

## 2023-03-09 PROCEDURE — C1769 GUIDE WIRE: HCPCS | Performed by: INTERNAL MEDICINE

## 2023-03-09 PROCEDURE — 81025 URINE PREGNANCY TEST: CPT | Performed by: INTERNAL MEDICINE

## 2023-03-09 PROCEDURE — 25000003 PHARM REV CODE 250: Performed by: NURSE ANESTHETIST, CERTIFIED REGISTERED

## 2023-03-09 PROCEDURE — 43245 EGD DILATE STRICTURE: CPT | Performed by: INTERNAL MEDICINE

## 2023-03-09 PROCEDURE — 37000008 HC ANESTHESIA 1ST 15 MINUTES: Performed by: INTERNAL MEDICINE

## 2023-03-09 PROCEDURE — D9220A PRA ANESTHESIA: Mod: CRNA,,, | Performed by: NURSE ANESTHETIST, CERTIFIED REGISTERED

## 2023-03-09 PROCEDURE — 37000009 HC ANESTHESIA EA ADD 15 MINS: Performed by: INTERNAL MEDICINE

## 2023-03-09 PROCEDURE — 43245 PR EGD, FLEX, W/DILATION, GASTR/DUOD STRICTURE: ICD-10-PCS | Mod: ,,, | Performed by: INTERNAL MEDICINE

## 2023-03-09 RX ORDER — SODIUM CHLORIDE 9 MG/ML
INJECTION, SOLUTION INTRAVENOUS CONTINUOUS
Status: DISCONTINUED | OUTPATIENT
Start: 2023-03-09 | End: 2023-03-09 | Stop reason: HOSPADM

## 2023-03-09 RX ORDER — LIDOCAINE HYDROCHLORIDE 20 MG/ML
INJECTION, SOLUTION EPIDURAL; INFILTRATION; INTRACAUDAL; PERINEURAL
Status: DISCONTINUED | OUTPATIENT
Start: 2023-03-09 | End: 2023-03-09

## 2023-03-09 RX ORDER — SUCCINYLCHOLINE CHLORIDE 20 MG/ML
INJECTION INTRAMUSCULAR; INTRAVENOUS
Status: DISCONTINUED | OUTPATIENT
Start: 2023-03-09 | End: 2023-03-09

## 2023-03-09 RX ORDER — PROPOFOL 10 MG/ML
VIAL (ML) INTRAVENOUS
Status: DISCONTINUED | OUTPATIENT
Start: 2023-03-09 | End: 2023-03-09

## 2023-03-09 RX ORDER — SODIUM CHLORIDE 0.9 % (FLUSH) 0.9 %
10 SYRINGE (ML) INJECTION
Status: DISCONTINUED | OUTPATIENT
Start: 2023-03-09 | End: 2023-03-09 | Stop reason: HOSPADM

## 2023-03-09 RX ADMIN — PROPOFOL 200 MG: 10 INJECTION, EMULSION INTRAVENOUS at 10:03

## 2023-03-09 RX ADMIN — SODIUM CHLORIDE: 9 INJECTION, SOLUTION INTRAVENOUS at 10:03

## 2023-03-09 RX ADMIN — SUCCINYLCHOLINE CHLORIDE 120 MG: 20 INJECTION, SOLUTION INTRAMUSCULAR; INTRAVENOUS at 10:03

## 2023-03-09 RX ADMIN — PROPOFOL 150 MCG/KG/MIN: 10 INJECTION, EMULSION INTRAVENOUS at 10:03

## 2023-03-09 RX ADMIN — SODIUM CHLORIDE: 0.9 INJECTION, SOLUTION INTRAVENOUS at 10:03

## 2023-03-09 RX ADMIN — LIDOCAINE HYDROCHLORIDE 50 MG: 20 INJECTION, SOLUTION EPIDURAL; INFILTRATION; INTRACAUDAL at 10:03

## 2023-03-09 NOTE — ANESTHESIA POSTPROCEDURE EVALUATION
Anesthesia Post Evaluation    Patient: Mallory Abadie Bradbury    Procedure(s) Performed: Procedure(s) (LRB):  ESOPHAGOGASTRODUODENOSCOPY (EGD) (N/A)    Final Anesthesia Type: general      Patient location during evaluation: PACU  Patient participation: Yes- Able to Participate  Level of consciousness: awake  Post-procedure vital signs: reviewed and stable  Pain management: adequate  Airway patency: patent    PONV status at discharge: No PONV  Anesthetic complications: no      Cardiovascular status: blood pressure returned to baseline  Respiratory status: unassisted, spontaneous ventilation and room air            Vitals Value Taken Time   BP 98/60 03/09/23 1217   Temp 36.7 °C (98 °F) 03/09/23 1215   Pulse 0 03/09/23 1233   Resp 26 03/09/23 1230   SpO2 100 % 03/09/23 1231   Vitals shown include unvalidated device data.      No case tracking events are documented in the log.      Pain/Tere Score: Tere Score: 10 (3/9/2023 11:25 AM)

## 2023-03-09 NOTE — H&P
Short Stay Endoscopy History and Physical    PCP - Lizzie Ernst NP  Referring Physician - Domingo Durand MD  [address] [place] [address]    Procedure - EGD with dilation  ASA - per anesthesia  Mallampati - per anesthesia  History of Anesthesia problems - no  Family history Anesthesia problems -  no   Plan of anesthesia - General    HPI:  This is a 32 y.o. female here for evaluation of: dysphagia    Reflux - no  Dysphagia - POS  Abdominal pain - no  Diarrhea - no    ROS:  Constitutional: No fevers, chills, No weight loss  CV: No chest pain  Pulm: No cough, No shortness of breath  GI: see HPI    Medical History:  has a past medical history of Difficult intubation, Down's syndrome, Esophageal stenosis, GERD (gastroesophageal reflux disease), Hiatal hernia, and Thyroid disease.    Surgical History:  has a past surgical history that includes Tonsillectomy; Appendectomy; Tongue surgery; esposgus; Esophagogastroduodenoscopy (N/A, 1/27/2020); Esophagogastroduodenoscopy (N/A, 7/14/2020); Esophagogastroduodenoscopy (N/A, 8/28/2020); Colonoscopy (N/A, 8/28/2020); Esophagogastroduodenoscopy (N/A, 2/9/2021); Esophagogastroduodenoscopy (N/A, 6/29/2021); and Esophagogastroduodenoscopy (N/A, 4/13/2022).    Family History: family history includes Colon cancer (age of onset: 60) in her paternal grandfather..    Social History:  reports that she has never smoked. She has never used smokeless tobacco. She reports that she does not drink alcohol and does not use drugs.    Review of patient's allergies indicates:   Allergen Reactions    Amoxicillin Hives    Augmentin [amoxicillin-pot clavulanate] Rash    Bactrim [sulfamethoxazole-trimethoprim] Rash    Duricef [cefadroxil] Rash    Keflex [cephalexin] Rash    Rifampin Rash       Medications:   Medications Prior to Admission   Medication Sig Dispense Refill Last Dose    escitalopram oxalate (LEXAPRO) 20 MG tablet Take 1 tablet by mouth once daily.   3/8/2023     levothyroxine (SYNTHROID) 75 MCG tablet Take 1 tablet (75 mcg total) by mouth every Tuesday, Thursday, Saturday, Sunday. 16 tablet 11 Past Week    levothyroxine (SYNTHROID) 88 MCG tablet Take 1 tablet (88 mcg total) by mouth every Mon, Wed, Fri. 12 tablet 11 Past Week    omeprazole (PRILOSEC) 40 MG capsule Take 1 capsule (40 mg total) by mouth before breakfast. Take one pill every morning 45 minutes before breakfast 90 capsule 3 3/8/2023    clindamycin (CLEOCIN) 300 MG capsule Take 1 capsule (300 mg total) by mouth every 8 (eight) hours. 21 capsule 0     doxycycline hyclate 50 mg Tab Take by mouth.       hydrocortisone 2.5 % cream EMEKA SML AMT EXT AA BID  1     medroxyPROGESTERone (DEPO-PROVERA) 150 mg/mL injection   2     mupirocin calcium 2% nasl oint (BACTROBAN NASAL) 2 % Oint mupirocin 2 % topical ointment   APPLY A SMALL AMOUNT TO THE AFFECTED AREA BY TOPICAL ROUTE 3 TIMES PER DAY          Physical Exam:    Vital Signs:   Vitals:    03/09/23 0948   BP: (!) 101/55   Pulse: 70   Resp: 16   Temp: 98 °F (36.7 °C)       General Appearance: Well appearing in no acute distress  Eyes:    No scleral icterus  Lungs: no labored breathing  Heart:  Regular  Abdomen: non tender    Labs:  Lab Results   Component Value Date    WBC 12.52 04/15/2022    HGB 12.6 04/15/2022    HCT 38.0 04/15/2022     04/15/2022    ALT 16 04/13/2022    AST 16 04/13/2022     04/19/2022    K 3.8 04/19/2022     04/19/2022    CREATININE 1.1 04/19/2022    BUN 9 04/19/2022    CO2 20 (L) 04/19/2022    TSH 0.961 05/06/2022    INR 1.0 08/18/2020       I have explained the risks and benefits of this endoscopic procedure to the patient including but not limited to bleeding, inflammation, infection, perforation, and death.      Ryan Floyd MD

## 2023-03-09 NOTE — PLAN OF CARE
Discharge instructions discussed with pt's parents. Pt's mother and father verbalizes understanding. MD Mariel came to bedside and spoke with pt's parents. Consents in chart, vital signs stable. Pt tolerating PO liquids.

## 2023-03-09 NOTE — TRANSFER OF CARE
"Anesthesia Transfer of Care Note    Patient: Mallory Abadie Bradbury    Procedure(s) Performed: Procedure(s) (LRB):  ESOPHAGOGASTRODUODENOSCOPY (EGD) (N/A)    Patient location: [facility]    Anesthesia Type: general    Transport from OR: Transported from OR on 6-10 L/min O2 by face mask with adequate spontaneous ventilation    Post pain: adequate analgesia    Post assessment: no apparent anesthetic complications    Post vital signs: stable    Level of consciousness: awake    Nausea/Vomiting: no nausea/vomiting    Complications: none    Transfer of care protocol was followed      Last vitals:   Visit Vitals  BP (!) 101/55 (Patient Position: Lying)   Pulse 70   Temp 36.7 °C (98 °F) (Temporal)   Resp 16   Ht 4' 5" (1.346 m)   Wt 80.7 kg (178 lb)   SpO2 100%   Breastfeeding No   BMI 44.55 kg/m²     "

## 2023-03-09 NOTE — PROVATION PATIENT INSTRUCTIONS
Discharge Summary/Instructions after an Endoscopic Procedure  Patient Name: Danae Ruff  Patient MRN: [id number]  Patient YOB: 1990 Thursday, March 9, 2023  Ryan Floyd MD  Dear patient,  As a result of recent federal legislation (The Federal Cures Act), you may   receive lab or pathology results from your procedure in your [facility]   account before your physician is able to contact you. Your physician or   their representative will relay the results to you with their   recommendations at their soonest availability.  Thank you,  RESTRICTIONS:  During your procedure today, you received medications for sedation.  These   medications may affect your judgment, balance and coordination.  Therefore,   for 24 hours, you have the following restrictions:   - DO NOT drive a car, operate machinery, make legal/financial decisions,   sign important papers or drink alcohol.    ACTIVITY:  Today: no heavy lifting, straining or running due to procedural   sedation/anesthesia.  The following day: return to full activity including work.  DIET:  Eat and drink normally unless instructed otherwise.     TREATMENT FOR COMMON SIDE EFFECTS:  - Mild abdominal pain, nausea, belching, bloating or excessive gas:  rest,   eat lightly and use a heating pad.  - Sore Throat: treat with throat lozenges and/or gargle with warm salt   water.  - Because air was used during the procedure, expelling large amounts of air   from your rectum or belching is normal.  - If a bowel prep was taken, you may not have a bowel movement for 1-3 days.    This is normal.  SYMPTOMS TO WATCH FOR AND REPORT TO YOUR PHYSICIAN:  1. Abdominal pain or bloating, other than gas cramps.  2. Chest pain.  3. Back pain.  4. Signs of infection such as: chills or fever occurring within 24 hours   after the procedure.  5. Rectal bleeding, which would show as bright red, maroon, or black stools.   (A tablespoon of blood from the rectum is not serious, especially if    hemorrhoids are present.)  6. Vomiting.  7. Weakness or dizziness.  GO DIRECTLY TO THE NEAREST EMERGENCY ROOM IF YOU HAVE ANY OF THE FOLLOWING:      Difficulty breathing              Chills and/or fever over 101 F   Persistent vomiting and/or vomiting blood   Severe abdominal pain   Severe chest pain   Black, tarry stools   Bleeding- more than one tablespoon   Any other symptom or condition that you feel may need urgent attention  Your doctor recommends these additional instructions:  If any biopsies were taken, your doctors clinic will contact you in 1 to 2   weeks with any results.  - Discharge patient to home (ambulatory).   - Resume previous diet; Discharge to home (ambulatory); Resume outpatient   medications  - Return to primary care physician as previously scheduled.   - Return to GI clinic PRN.  For questions, problems or results please call your physician - Ryan Floyd MD at Work:  [phone].  [facility], EMERGENCY ROOM PHONE NUMBER: [phone]  IF A COMPLICATION OR EMERGENCY SITUATION ARISES AND YOU ARE UNABLE TO REACH   YOUR PHYSICIAN - GO DIRECTLY TO THE EMERGENCY ROOM.  Ryan Floyd MD  3/9/2023 11:05:02 AM  This report has been verified and signed electronically.  Dear patient,  As a result of recent federal legislation (The Federal Cures Act), you may   receive lab or pathology results from your procedure in your [facility]   account before your physician is able to contact you. Your physician or   their representative will relay the results to you with their   recommendations at their soonest availability.  Thank you,  PROVATION

## 2023-03-09 NOTE — ANESTHESIA PREPROCEDURE EVALUATION
Pre-operative evaluation for Procedure(s) (LRB):  ESOPHAGOGASTRODUODENOSCOPY (EGD) (N/A)    Mallory Abadie Bradbury is a 32 y.o. female w/ Downs Syndrome, morbid obesity, eosinophilic esophagitis w/ dysphagia who presents for above procedure.       Prev airway (2/9/2021):    Induction:  Intravenous    Intubated:  Postinduction    Mask Ventilation:  Easy mask    Attempts:  1    Attempted By:  CRNA    Method of Intubation:  Video laryngoscopy    Blade:  Jiménez 3    Laryngeal View Grade: Grade I - full view of chords      Difficult Airway Encountered?: No      Complications:  None    Airway Device:  Oral endotracheal tube    Airway Device Size:  7.0    Style/Cuff Inflation:  Cuffed (inflated to minimal occlusive pressure)    Inflation Amount (mL):  5    Secured at:  The lips    Placement Verified By:  Capnometry    Complicating Factors:  None    Findings Post-Intubation:  BS equal bilateral    EKG (4/13/2022):   Vent. Rate : 064 BPM     Atrial Rate : 064 BPM      P-R Int : 112 ms          QRS Dur : 082 ms       QT Int : 402 ms       P-R-T Axes : 022 064 042 degrees      QTc Int : 414 ms   Sinus rhythm with sinus arrhythmia   Normal ECG       2D Echo: none on record    Patient Active Problem List   Diagnosis    Bilateral foot pain    Corn or callus - Right Foot    Dysphagia    Iron deficiency    Aspiration pneumonia of left lower lobe due to gastric secretions    Coffee ground emesis    Depressive disorder    Hiatal hernia    Complete trisomy 21 syndrome    Disease of thyroid gland    Body mass index (BMI) of 40.0-44.9 in adult    Vitamin B12 deficiency (non anemic)    Stricture of esophagus       Review of patient's allergies indicates:   Allergen Reactions    Amoxicillin Hives    Augmentin [amoxicillin-pot clavulanate] Rash    Bactrim [sulfamethoxazole-trimethoprim] Rash    Duricef [cefadroxil] Rash    Keflex [cephalexin] Rash    Rifampin Rash        No current facility-administered medications  on file prior to encounter.     Current Outpatient Medications on File Prior to Encounter   Medication Sig Dispense Refill    clindamycin (CLEOCIN) 300 MG capsule Take 1 capsule (300 mg total) by mouth every 8 (eight) hours. 21 capsule 0    doxycycline hyclate 50 mg Tab Take by mouth.      escitalopram oxalate (LEXAPRO) 20 MG tablet Take 1 tablet by mouth once daily.      hydrocortisone 2.5 % cream EMEKA SML AMT EXT AA BID  1    levothyroxine (SYNTHROID) 75 MCG tablet Take 1 tablet (75 mcg total) by mouth every Tuesday, Thursday, Saturday, Sunday. 16 tablet 11    levothyroxine (SYNTHROID) 88 MCG tablet Take 1 tablet (88 mcg total) by mouth every Mon, Wed, Fri. 12 tablet 11    medroxyPROGESTERone (DEPO-PROVERA) 150 mg/mL injection   2    mupirocin calcium 2% nasl oint (BACTROBAN NASAL) 2 % Oint mupirocin 2 % topical ointment   APPLY A SMALL AMOUNT TO THE AFFECTED AREA BY TOPICAL ROUTE 3 TIMES PER DAY      omeprazole (PRILOSEC) 40 MG capsule Take 1 capsule (40 mg total) by mouth before breakfast. Take one pill every morning 45 minutes before breakfast 90 capsule 3    [DISCONTINUED] budesonide 1 mg/2 mL NbSp budesonide 2 mg/day, usually in divided dose of 1 mg/2mL budesonide mixed with 5 g sucralose orally for 8 weeks. (Patient not taking: No sig reported) 100 mL 0    [DISCONTINUED] cetirizine (ZYRTEC) 1 mg/mL syrup Take by mouth once daily.      [DISCONTINUED] estradiol cypionate (DEPO-ESTRADIOL) 5 mg/mL injection Inject into the muscle every 28 days.      [DISCONTINUED] folic acid (FOLVITE) 1 MG tablet TAKE 1 TABLET(1 MG) BY MOUTH EVERY DAY 30 tablet 11    [DISCONTINUED] pantoprazole (PROTONIX) 40 MG tablet Take 1 tablet (40 mg total) by mouth before breakfast. 90 tablet 3       Past Surgical History:   Procedure Laterality Date    APPENDECTOMY      COLONOSCOPY N/A 8/28/2020    Procedure: COLONOSCOPY;  Surgeon: Domingo Durand MD;  Location: [address];  Service: Endoscopy;  Laterality: N/A;   Please schedule in the 2nd floor for airway protection body habitus down syndrome     COVID test at [place] on 8/25/20-GT    ESOPHAGOGASTRODUODENOSCOPY N/A 1/27/2020    Procedure: EGD (ESOPHAGOGASTRODUODENOSCOPY);  Surgeon: Violette Garrett MD;  Location[facility];  Service: Endoscopy;  Laterality: N/A;    ESOPHAGOGASTRODUODENOSCOPY N/A 7/14/2020    Procedure: EGD (ESOPHAGOGASTRODUODENOSCOPY);  Surgeon: Domingo Durand MD;  Location: [facility] (2ND FLR);  Service: Endoscopy;  Laterality: N/A;  Urgent EGD 2nd floor for airway protection due to difficult intubation airway anatomy for esophageal dilation.  Please schedule her next week 2nd floor for dysphagia.     COVID test at [place] on 7/11/20-GT    ESOPHAGOGASTRODUODENOSCOPY N/A 8/28/2020    Procedure: EGD (ESOPHAGOGASTRODUODENOSCOPY);  Surgeon: Domingo Durand MD;  Location: [facility] (2ND FLR);  Service: Endoscopy;  Laterality: N/A;  per Dr Kiser-add EGD to colonoscopy order  8/26-pt's mother confirmed appt-MS    ESOPHAGOGASTRODUODENOSCOPY N/A 2/9/2021    Procedure: EGD (ESOPHAGOGASTRODUODENOSCOPY);  Surgeon: Domingo Durand MD;  Location: [facility] (2ND FLR);  Service: Endoscopy;  Laterality: N/A;  Please schedule patient 2nd floor 1st available provider for esophageal dysphagia and history of it difficult intubation in airway per her father.  Patient with Down syndrome is here with her father he states that she has been having trouble swallowing    ESOPHAGOGASTRODUODENOSCOPY N/A 6/29/2021    Procedure: EGD (ESOPHAGOGASTRODUODENOSCOPY);  Surgeon: Ryan Floyd MD;  Location: [facility] (2ND FLR);  Service: Endoscopy;  Laterality: N/A;  dysphagia needs dialation     Please schedule patient 2nd floor 1st available provider for esophageal dysphagia and history of it difficult intubation in airway per her father.  Patient with Down syndrome. mother requesting ASAP  fully vaccinated-GT    ESOPHAGOGASTRODUODENOSCOPY N/A 4/13/2022    Procedure: EGD  (ESOPHAGOGASTRODUODENOSCOPY);  Surgeon: Domingo Durand MD;  [facility] ([facility]);  Service: Endoscopy;  Laterality: N/A;  Dysphagia please evaluate for esophageal dilation     Please schedule patient 2nd floor 1st available provider for esophageal dysphagia and history of it difficult intubation in airway per her father.  Patient with Down syndrome. mother requesting ASAP    esposgus      TONGUE SURGERY      TONSILLECTOMY         Social History     Socioeconomic History    Marital status: Single   Tobacco Use    Smoking status: Never    Smokeless tobacco: Never   Substance and Sexual Activity    Alcohol use: No    Drug use: Never    Sexual activity: Never   Social History Narrative    Patient with Down syndrome         Vital Signs Range (Last 24H):         CBC: No results for input(s): WBC, RBC, HGB, HCT, PLT, MCV, MCH, MCHC in the last 72 hours.    CMP: No results for input(s): NA, K, CL, CO2, BUN, CREATININE, GLU, MG, PHOS, CALCIUM, ALBUMIN, PROT, ALKPHOS, ALT, AST, BILITOT in the last 72 hours.    INR  No results for input(s): PT, INR, PROTIME, APTT in the last 72 hours.          Pre-op Assessment    I have reviewed the Patient Summary Reports.     I have reviewed the Nursing Notes. I have reviewed the NPO Status.   I have reviewed the Medications.     Review of Systems  Anesthesia Hx:  No problems with previous Anesthesia   Denies Personal Hx of Anesthesia complications.   Hematology/Oncology:     Oncology Normal     Cardiovascular:  Cardiovascular Normal     Pulmonary:  Pulmonary Normal    Hepatic/GI:   Hiatal Hernia, GERD    Neurological:  Neurology Normal    Endocrine:  Morbid Obesity / BMI > 40  Psych:   Psychiatric History          Physical Exam  General: Alert and Oriented    Airway:  Mallampati: III   Tongue: Large    Dental:  Intact    Chest/Lungs:  Clear to auscultation, Normal Respiratory Rate    Heart:  Rate: Normal  Rhythm: Regular Rhythm        Anesthesia Plan  Type of  Anesthesia, risks & benefits discussed:    Anesthesia Type: Gen Natural Airway  Intra-op Monitoring Plan: Standard ASA Monitors  Post Op Pain Control Plan: IV/PO Opioids PRN and multimodal analgesia  Induction:  IV  Airway Plan: Direct and Video  Informed Consent: Informed consent signed with the Patient and all parties understand the risks and agree with anesthesia plan.  All questions answered.   ASA Score: 3  Day of Surgery Review of History & Physical: H&P Update referred to the surgeon/provider.    Ready For Surgery From Anesthesia Perspective.     .

## 2023-03-22 ENCOUNTER — OFFICE VISIT (OUTPATIENT)
Dept: URGENT CARE | Facility: CLINIC | Age: 33
End: 2023-03-22
Payer: MEDICAID

## 2023-03-22 VITALS
WEIGHT: 178 LBS | SYSTOLIC BLOOD PRESSURE: 104 MMHG | HEIGHT: 55 IN | TEMPERATURE: 98 F | RESPIRATION RATE: 16 BRPM | BODY MASS INDEX: 41.19 KG/M2 | OXYGEN SATURATION: 100 % | DIASTOLIC BLOOD PRESSURE: 70 MMHG | HEART RATE: 77 BPM

## 2023-03-22 DIAGNOSIS — H92.01 ACUTE OTALGIA, RIGHT: Primary | ICD-10-CM

## 2023-03-22 PROCEDURE — 99213 PR OFFICE/OUTPT VISIT, EST, LEVL III, 20-29 MIN: ICD-10-PCS | Mod: S$GLB,,, | Performed by: PHYSICIAN ASSISTANT

## 2023-03-22 PROCEDURE — 99213 OFFICE O/P EST LOW 20 MIN: CPT | Mod: S$GLB,,, | Performed by: PHYSICIAN ASSISTANT

## 2023-03-22 RX ORDER — MUPIROCIN 20 MG/G
OINTMENT TOPICAL 2 TIMES DAILY
Qty: 30 G | Refills: 0 | Status: SHIPPED | OUTPATIENT
Start: 2023-03-22 | End: 2023-03-29

## 2023-03-22 NOTE — PROGRESS NOTES
"Subjective:       Patient ID: Mallory Abadie Bradbury is a 32 y.o. female.    Vitals:  height is 4' 5" (1.346 m) and weight is 80.7 kg (178 lb). Her oral temperature is 97.6 °F (36.4 °C). Her blood pressure is 104/70 and her pulse is 77. Her respiration is 16 and oxygen saturation is 100%.     Chief Complaint: Otalgia    Mallory Abadie Bradbury is a 32 y.o. female presenting for evaluation of right ear pain, persisting for the last couple of days.  No fever, no chills.  No recent nasal congestion, cough or viral symptoms.  Mom is concerned that she may have an ear infection. She has a past medical history of Difficult intubation, Down's syndrome, Esophageal stenosis, GERD (gastroesophageal reflux disease), Hiatal hernia, and Thyroid disease.      Otalgia   There is pain in the right ear. This is a new problem. The current episode started yesterday. The problem has been gradually worsening. Pertinent negatives include no abdominal pain, coughing, diarrhea, ear discharge, headaches, neck pain, rash or sore throat. She has tried nothing for the symptoms.     Constitution: Negative for chills and fever.   HENT:  Positive for ear pain. Negative for ear discharge, congestion and sore throat.    Neck: Negative for neck pain.   Cardiovascular:  Negative for chest pain and palpitations.   Respiratory:  Negative for cough, shortness of breath and asthma.    Gastrointestinal:  Negative for abdominal pain, nausea, constipation and diarrhea.   Musculoskeletal:  Negative for pain.   Skin:  Negative for rash.   Allergic/Immunologic: Negative for asthma.   Neurological:  Negative for dizziness, light-headedness, headaches, numbness and tingling.   Psychiatric/Behavioral:  Negative for nervous/anxious. The patient is not nervous/anxious.      Objective:      Physical Exam   Constitutional: She is oriented to person, place, and time. She appears well-developed. She is cooperative.  Non-toxic appearance. She does not appear ill. No " distress.   HENT:   Head: Normocephalic and atraumatic.   Ears:   Right Ear: Hearing, tympanic membrane and ear canal normal.   Left Ear: Hearing, tympanic membrane, external ear and ear canal normal.      Comments: Small area of erythema noted to right external ear.  No erythema, bulging or purulence noted to bilateral TMs.  No swelling, erythema or exudate noted to bilateral ear canals.   Nose: No mucosal edema, rhinorrhea, nasal deformity or congestion. No epistaxis. Right sinus exhibits no maxillary sinus tenderness and no frontal sinus tenderness. Left sinus exhibits no maxillary sinus tenderness and no frontal sinus tenderness.   Mouth/Throat: Uvula is midline and mucous membranes are normal. No trismus in the jaw. Normal dentition. No uvula swelling. No oropharyngeal exudate, posterior oropharyngeal edema or posterior oropharyngeal erythema.   Eyes: Conjunctivae and lids are normal. No scleral icterus.   Neck: Trachea normal and phonation normal. Neck supple. No edema present. No erythema present. No neck rigidity present.   Cardiovascular: Normal rate, regular rhythm, normal heart sounds and normal pulses.   Pulmonary/Chest: Effort normal and breath sounds normal. No respiratory distress. She has no decreased breath sounds. She has no rhonchi.   Abdominal: Normal appearance.   Musculoskeletal: Normal range of motion.         General: No deformity. Normal range of motion.   Lymphadenopathy:     She has no cervical adenopathy.   Neurological: She is alert and oriented to person, place, and time. She exhibits normal muscle tone. Coordination normal.   Skin: Skin is warm, dry, intact, not diaphoretic and not pale.   Psychiatric: Her speech is normal and behavior is normal. Judgment and thought content normal.   Nursing note and vitals reviewed.      Assessment:       1. Acute otalgia, right          Plan:         Acute otalgia, right  -     mupirocin (BACTROBAN) 2 % ointment; Apply topically 2 (two) times daily.  for 7 days  Dispense: 30 g; Refill: 0           Medical Decision Making:   History:   Old Medical Records: I decided to obtain old medical records.  Old Records Summarized: records from previous admission(s) and records from clinic visits.  Differential Diagnosis:   Otitis media   Serous Otitis Media   Otitis externa   Cellulitis/papule   Urgent Care Management:  Small area of erythema noted to external right ear that may be a developing papule.  No evidence for otitis media or externa.  No need for oral antibiotics at this time.  She will be discharged home with a prescription for topical Bactroban.  She will follow-up with her PCP for re-evaluation as needed.  Mom voices understanding and is agreeable to the plan.  She is given specific return precautions.

## 2023-05-03 ENCOUNTER — LAB VISIT (OUTPATIENT)
Dept: LAB | Facility: HOSPITAL | Age: 33
End: 2023-05-03
Attending: INTERNAL MEDICINE
Payer: MEDICAID

## 2023-05-03 DIAGNOSIS — I51.9 MYXEDEMA HEART DISEASE: Primary | ICD-10-CM

## 2023-05-03 DIAGNOSIS — E03.9 MYXEDEMA HEART DISEASE: Primary | ICD-10-CM

## 2023-05-03 LAB
T4 FREE SERPL-MCNC: 0.92 NG/DL (ref 0.71–1.51)
TSH SERPL DL<=0.005 MIU/L-ACNC: 1.23 UIU/ML (ref 0.4–4)

## 2023-05-03 PROCEDURE — 84443 ASSAY THYROID STIM HORMONE: CPT | Performed by: INTERNAL MEDICINE

## 2023-05-03 PROCEDURE — 84439 ASSAY OF FREE THYROXINE: CPT | Performed by: INTERNAL MEDICINE

## 2023-05-03 PROCEDURE — 36415 COLL VENOUS BLD VENIPUNCTURE: CPT | Performed by: INTERNAL MEDICINE

## 2023-05-17 DIAGNOSIS — K21.9 GASTROESOPHAGEAL REFLUX DISEASE, UNSPECIFIED WHETHER ESOPHAGITIS PRESENT: ICD-10-CM

## 2023-05-17 RX ORDER — OMEPRAZOLE 40 MG/1
CAPSULE, DELAYED RELEASE ORAL
Qty: 30 CAPSULE | Refills: 3 | Status: SHIPPED | OUTPATIENT
Start: 2023-05-17 | End: 2023-09-07

## 2023-06-22 ENCOUNTER — TELEPHONE (OUTPATIENT)
Dept: PODIATRY | Facility: CLINIC | Age: 33
End: 2023-06-22
Payer: MEDICAID

## 2023-06-22 NOTE — TELEPHONE ENCOUNTER
----- Message from Sagar Brown sent at 6/22/2023  3:09 PM CDT -----  Contact: Mom  Type:  Sooner Appointment Request    Caller is requesting a sooner appointment.  Caller declined first available appointment listed below.  Caller will not accept being placed on the waitlist and is requesting a message be sent to doctor.    Name of Caller:  Patient  When is the first available appointment?  N/a  Symptoms:  Callous  Best Call Back Number:  [phone]      Additional Information:  Mom states she would like to get pt satish for an appt says its is becoming painful for her and also is handicap.Please call back

## 2023-06-26 ENCOUNTER — OFFICE VISIT (OUTPATIENT)
Dept: URGENT CARE | Facility: CLINIC | Age: 33
End: 2023-06-26
Payer: MEDICAID

## 2023-06-26 VITALS
HEIGHT: 55 IN | DIASTOLIC BLOOD PRESSURE: 70 MMHG | WEIGHT: 174 LBS | OXYGEN SATURATION: 100 % | SYSTOLIC BLOOD PRESSURE: 100 MMHG | TEMPERATURE: 98 F | BODY MASS INDEX: 40.27 KG/M2 | HEART RATE: 72 BPM | RESPIRATION RATE: 20 BRPM

## 2023-06-26 DIAGNOSIS — L84 CORNS AND CALLOSITY: Primary | ICD-10-CM

## 2023-06-26 PROCEDURE — 99213 OFFICE O/P EST LOW 20 MIN: CPT | Mod: S$GLB,,, | Performed by: NURSE PRACTITIONER

## 2023-06-26 PROCEDURE — 99213 PR OFFICE/OUTPT VISIT, EST, LEVL III, 20-29 MIN: ICD-10-PCS | Mod: S$GLB,,, | Performed by: NURSE PRACTITIONER

## 2023-06-26 NOTE — PATIENT INSTRUCTIONS
Wear thick socks and padded insoles until seen by Podiatry  Offload foot as often as possible by sitting and elevating feet  Do not wear flat shoes, sandals, flip-flops or other on cushion shoes until seen by Podiatry  Return to this clinic for any future concerns

## 2023-06-26 NOTE — PROGRESS NOTES
"Subjective:      Patient ID: Mallory Abadie Bradbury is a 33 y.o. female.    Vitals:  height is 4' 6" (1.372 m) and weight is 78.9 kg (174 lb). Her temperature is 98.3 °F (36.8 °C). Her blood pressure is 100/70 and her pulse is 72. Her respiration is 20 and oxygen saturation is 100%.     Chief Complaint: Foot Pain    Pt states" c/o L foot pain that has been going on for 3 weeks. Request referral to Josh Little. Took advil but did not help."      Foot Pain  Pertinent negatives include no chest pain, chills or fever.     Constitution: Negative for chills and fever.   Cardiovascular:  Negative for chest pain, palpitations and sob on exertion.   Respiratory:  Negative for shortness of breath.    Musculoskeletal:  Positive for pain.   Skin:  Positive for skin thickening/induration. Negative for lesion.    Objective:     Physical Exam   Constitutional: She is oriented to person, place, and time. She appears well-developed. She is cooperative.  Non-toxic appearance. She does not appear ill. No distress.   HENT:   Head: Normocephalic and atraumatic.   Ears:   Right Ear: External ear normal.   Left Ear: External ear normal.   Nose: Nose normal.   Mouth/Throat: Oropharynx is clear and moist and mucous membranes are normal. Mucous membranes are moist. Oropharynx is clear.   Eyes: Conjunctivae and lids are normal. No scleral icterus.   Neck: Trachea normal and phonation normal. Neck supple.   Cardiovascular: Normal rate, regular rhythm, normal heart sounds and normal pulses.   Pulmonary/Chest: Effort normal and breath sounds normal. No stridor. No respiratory distress.   Abdominal: Normal appearance.   Musculoskeletal:         General: No deformity.        Feet:    Neurological: no focal deficit. She is alert and oriented to person, place, and time. She has normal strength and normal reflexes. No sensory deficit.   Skin: Skin is warm, dry, intact and not diaphoretic. Capillary refill takes 2 to 3 seconds.   Psychiatric: Her " speech is normal and behavior is normal. Judgment and thought content normal.   Nursing note and vitals reviewed.    Assessment:     1. Errol and jeffrey        Plan:       Errol and jeffrey  -     Ambulatory referral/consult to Podiatry        I have discussed the physical exam findings with the patient's mother.  She states she is here primarily for referral to Dr. Little only.  She verbalized understanding and agreement with the plan of care.

## 2023-07-05 ENCOUNTER — OFFICE VISIT (OUTPATIENT)
Dept: PODIATRY | Facility: CLINIC | Age: 33
End: 2023-07-05
Payer: MEDICAID

## 2023-07-05 DIAGNOSIS — M79.672 LEFT FOOT PAIN: ICD-10-CM

## 2023-07-05 DIAGNOSIS — S90.852A FOREIGN BODY IN LEFT FOOT, INITIAL ENCOUNTER: Primary | ICD-10-CM

## 2023-07-05 DIAGNOSIS — L85.1 ACQUIRED KERATODERMA: ICD-10-CM

## 2023-07-05 PROCEDURE — 10120 PR REMOVE FOREIGN BODY SIMPLE: ICD-10-PCS | Mod: S$PBB,,, | Performed by: PODIATRIST

## 2023-07-05 PROCEDURE — 10120 INC&RMVL FB SUBQ TISS SMPL: CPT | Mod: S$PBB,,, | Performed by: PODIATRIST

## 2023-07-05 PROCEDURE — 99999 PR PBB SHADOW E&M-EST. PATIENT-LVL III: CPT | Mod: PBBFAC,,, | Performed by: PODIATRIST

## 2023-07-05 PROCEDURE — 10120 INC&RMVL FB SUBQ TISS SMPL: CPT | Mod: PBBFAC,PN | Performed by: PODIATRIST

## 2023-07-05 PROCEDURE — 1160F PR REVIEW ALL MEDS BY PRESCRIBER/CLIN PHARMACIST DOCUMENTED: ICD-10-PCS | Mod: CPTII,,, | Performed by: PODIATRIST

## 2023-07-05 PROCEDURE — 1160F RVW MEDS BY RX/DR IN RCRD: CPT | Mod: CPTII,,, | Performed by: PODIATRIST

## 2023-07-05 PROCEDURE — 99999 PR PBB SHADOW E&M-EST. PATIENT-LVL III: ICD-10-PCS | Mod: PBBFAC,,, | Performed by: PODIATRIST

## 2023-07-05 PROCEDURE — 99203 PR OFFICE/OUTPT VISIT, NEW, LEVL III, 30-44 MIN: ICD-10-PCS | Mod: 25,S$PBB,, | Performed by: PODIATRIST

## 2023-07-05 PROCEDURE — 1159F PR MEDICATION LIST DOCUMENTED IN MEDICAL RECORD: ICD-10-PCS | Mod: CPTII,,, | Performed by: PODIATRIST

## 2023-07-05 PROCEDURE — 99213 OFFICE O/P EST LOW 20 MIN: CPT | Mod: PBBFAC,PN,25 | Performed by: PODIATRIST

## 2023-07-05 PROCEDURE — 1159F MED LIST DOCD IN RCRD: CPT | Mod: CPTII,,, | Performed by: PODIATRIST

## 2023-07-05 PROCEDURE — 99203 OFFICE O/P NEW LOW 30 MIN: CPT | Mod: 25,S$PBB,, | Performed by: PODIATRIST

## 2023-07-05 RX ORDER — MINOCYCLINE HYDROCHLORIDE 100 MG/1
100 CAPSULE ORAL 2 TIMES DAILY
Status: ON HOLD | COMMUNITY
Start: 2023-06-05 | End: 2023-10-01 | Stop reason: HOSPADM

## 2023-07-05 NOTE — PROGRESS NOTES
[address] [facility] [address]  [place] [address]  Phone: [phone]   [phone]    Patient's PCP:Lizzie Ernst NP  Referring Provider: Rafiq Huggins Jr.    Subjective:      Chief Complaint:: No chief complaint on file.    HPI Mallory Abadie Bradbury is a 33 y.o. female who presents today with a complaint of left 1st MPJ callus and pain. The current episode started some time ago.  The symptoms include pain, hard skin formation with black center.  The symptoms are aggravated by walking, weightbearing and applied pressure. The problem has worsened. Treatment to date have included pedicures which provided some relief.     Shoe Size: 2    Past Surgical History:   Procedure Laterality Date    APPENDECTOMY      COLONOSCOPY N/A 8/28/2020    Procedure: COLONOSCOPY;  Surgeon: Domingo Durand MD;  Location: [facility] (2ND FLR);  Service: Endoscopy;  Laterality: N/A;  Please schedule in the 2nd floor for airway protection body habitus down syndrome     COVID test at [place] on 8/25/20-GT    ESOPHAGOGASTRODUODENOSCOPY N/A 1/27/2020    Procedure: EGD (ESOPHAGOGASTRODUODENOSCOPY);  Surgeon: Violette Garrett MD;  Location: [facility];  Service: Endoscopy;  Laterality: N/A;    ESOPHAGOGASTRODUODENOSCOPY N/A 7/14/2020    Procedure: EGD (ESOPHAGOGASTRODUODENOSCOPY);  Surgeon: Domingo Durand MD;  Location: [facility] (2ND FLR);  Service: Endoscopy;  Laterality: N/A;  Urgent EGD 2nd floor for airway protection due to difficult intubation airway anatomy for esophageal dilation.  Please schedule her next week 2nd floor for dysphagia.     COVID test at [place] on 7/11/20-GT    ESOPHAGOGASTRODUODENOSCOPY N/A 8/28/2020    Procedure: EGD (ESOPHAGOGASTRODUODENOSCOPY);  Surgeon: Domingo Durand MD;  Location: [facility] (2ND FLR);  Service: Endoscopy;  Laterality: N/A;  per Dr Kiser-add EGD to colonoscopy order  8/26-pt's mother confirmed appt-MS    ESOPHAGOGASTRODUODENOSCOPY N/A 2/9/2021    Procedure: EGD  (ESOPHAGOGASTRODUODENOSCOPY);  Surgeon: Domingo Durand MD;  [facility]  Service: Endoscopy;  Laterality: N/A;  Please schedule patient 2nd floor 1st available provider for esophageal dysphagia and history of it difficult intubation in airway per her father.  Patient with Down syndrome is here with her father he states that she has been having trouble swallowing    ESOPHAGOGASTRODUODENOSCOPY N/A 6/29/2021    Procedure: EGD (ESOPHAGOGASTRODUODENOSCOPY);  Surgeon: Ryan Floyd MD;  [facility]  Service: Endoscopy;  Laterality: N/A;  dysphagia needs dialation     Please schedule patient 2nd floor 1st available provider for esophageal dysphagia and history of it difficult intubation in airway per her father.  Patient with Down syndrome. mother requesting ASAP  fully vaccinated-GT    ESOPHAGOGASTRODUODENOSCOPY N/A 4/13/2022    Procedure: EGD (ESOPHAGOGASTRODUODENOSCOPY);  Surgeon: Domingo Durand MD;  Location: [facility] ([address] [place] [address]);  Service: Endoscopy;  Laterality: N/A;  Dysphagia please evaluate for esophageal dilation     Please schedule patient 2nd floor 1st available provider for esophageal dysphagia and history of it difficult intubation in airway per her father.  Patient with Down syndrome. mother requesting ASAP    ESOPHAGOGASTRODUODENOSCOPY N/A 3/9/2023    Procedure: ESOPHAGOGASTRODUODENOSCOPY (EGD);  Surgeon: Rayn Floyd MD;  Location: [facility] ([address] [place] [address]);  Service: Endoscopy;  Laterality: N/A;  hx of difficult intubation. down's syndrome  Endoscopy schedulers please schedule Danae for an urgent 1st MD available for EGD 2nd floor for airway protection for dysphagia likely will need esophageal dilation.  Thank you  instr portal-GT    esposgus      TONGUE SURGERY      TONSILLECTOMY       Past Medical History:   Diagnosis Date    Difficult intubation     per dad     Down's syndrome     Esophageal stenosis     GERD (gastroesophageal reflux disease)     Hiatal hernia      Thyroid disease      Family History   Problem Relation Age of Onset    Colon cancer Paternal Grandfather 60    Esophageal cancer Neg Hx     Celiac disease Neg Hx     Cirrhosis Neg Hx     Colon polyps Neg Hx         Social History:   Marital Status: Single  Alcohol History:  reports no history of alcohol use.  Tobacco History:  reports that she has never smoked. She has never used smokeless tobacco.  Drug History:  reports no history of drug use.    Review of patient's allergies indicates:   Allergen Reactions    Amoxicillin Hives    Augmentin [amoxicillin-pot clavulanate] Rash    Bactrim [sulfamethoxazole-trimethoprim] Rash    Duricef [cefadroxil] Rash    Keflex [cephalexin] Rash    Rifampin Rash       Current Outpatient Medications   Medication Sig Dispense Refill    clindamycin (CLEOCIN) 300 MG capsule Take 1 capsule (300 mg total) by mouth every 8 (eight) hours. 21 capsule 0    doxycycline hyclate 50 mg Tab Take by mouth.      escitalopram oxalate (LEXAPRO) 20 MG tablet Take 1 tablet by mouth once daily.      hydrocortisone 2.5 % cream EMEKA SML AMT EXT AA BID  1    levothyroxine (SYNTHROID) 75 MCG tablet Take 1 tablet (75 mcg total) by mouth every Tuesday, Thursday, Saturday, Sunday. 16 tablet 11    levothyroxine (SYNTHROID) 88 MCG tablet Take 1 tablet (88 mcg total) by mouth every Mon, Wed, Fri. 12 tablet 11    medroxyPROGESTERone (DEPO-PROVERA) 150 mg/mL injection   2    minocycline (MINOCIN,DYNACIN) 100 MG capsule Take 100 mg by mouth 2 (two) times daily.      mupirocin calcium 2% nasl oint (BACTROBAN NASAL) 2 % Oint mupirocin 2 % topical ointment   APPLY A SMALL AMOUNT TO THE AFFECTED AREA BY TOPICAL ROUTE 3 TIMES PER DAY      omeprazole (PRILOSEC) 40 MG capsule TAKE 1 CAPSULE BY MOUTH 45MINUTES BEFORE BREAKFAST 30 capsule 3     No current facility-administered medications for this visit.       Review of Systems   Constitutional:  Negative for chills, fatigue, fever and unexpected weight change.   HENT:   Negative for hearing loss and trouble swallowing.    Eyes:  Negative for photophobia and visual disturbance.   Respiratory:  Negative for cough, shortness of breath and wheezing.    Cardiovascular:  Negative for chest pain, palpitations and leg swelling.   Gastrointestinal:  Negative for abdominal pain and nausea.   Genitourinary:  Negative for dysuria and frequency.   Musculoskeletal:  Negative for arthralgias, back pain, gait problem, joint swelling and myalgias.   Skin:  Negative for rash and wound.   Neurological:  Negative for tremors, seizures, speech difficulty, weakness and headaches.   Hematological:  Does not bruise/bleed easily.       Objective:        Physical Exam:   Foot Exam    General  General Appearance: appears stated age and healthy   Orientation: alert and oriented to person, place, and time   Affect: appropriate   Gait: unimpaired       Left Foot/Ankle      Inspection and Palpation  Ecchymosis: none  Tenderness: great toe metatarsophalangeal joint   Swelling: none   Arch: normal  Hammertoes: absent  Claw toes: absent  Hallux valgus: no  Hallux limitus: no  Skin Exam: callus; no drainage, no ulcer and no erythema   Neurovascular  Dorsalis pedis: 2+  Posterior tibial: 2+  Capillary refill: 2+  Varicose veins: not present  Saphenous nerve sensation: normal  Tibial nerve sensation: normal  Superficial peroneal nerve sensation: normal  Deep peroneal nerve sensation: normal  Sural nerve sensation: normal    Muscle Strength  Ankle dorsiflexion: 5  Ankle plantar flexion: 5  Ankle inversion: 5  Ankle eversion: 5  Great toe extension: 5  Great toe flexion: 5    Range of Motion    Normal left ankle ROM    Tests  Anterior drawer: negative   Talar tilt: negative   PT Tinel's sign: negative  Paresthesia: negative    Physical Exam  Cardiovascular:      Pulses:           Dorsalis pedis pulses are 2+ on the left side.        Posterior tibial pulses are 2+ on the left side.   Musculoskeletal:      Left foot: No  bunion.        Feet:    Feet:      Left foot:      Skin integrity: Callus present. No ulcer or erythema.             Left Ankle/Foot Exam     Tenderness   The patient is tender to palpation of the great toe metatarsophalangeal joint.    Range of Motion   The patient has normal left ankle ROM.       Muscle Strength   Left Lower Extremity   Ankle Dorsiflexion:  5   Plantar flexion:  5/5     Vascular Exam       Left Pulses  Dorsalis Pedis:      2+  Posterior Tibial:      2+         Imaging: none            Assessment:       1. Foreign body in left foot, initial encounter    2. Acquired keratoderma    3. Left foot pain      Plan:   Foreign body in left foot, initial encounter    Acquired keratoderma    Left foot pain      Follow up if symptoms worsen or fail to improve.    Procedures informed verbal consent was obtained.  The area was prepped with alcohol.  Utilizing a 15 blade I removed the overlying callus from the plantar left 1st metatarsal head.  Following this I was able to remove several small fragments of wooden splinter.  This extended into the dermal layer.  Patient tolerated the procedure well and stated relief following.    I discussed the patient and her mother that there is the callus being formed from the small retained wooden splinter.  Now this has been removed her symptoms should resolve.  She is okay to increase her activities as tolerated.  No restrictions.  Instructed them to return if her symptoms do not fully resolve.          Counseling:     I provided patient education verbally regarding:   Patient diagnosis, treatment options, as well as alternatives, risks, and benefits.     This note was created using Dragon voice recognition software that occasionally misinterpreted phrases or words.

## 2023-09-07 DIAGNOSIS — K21.9 GASTROESOPHAGEAL REFLUX DISEASE, UNSPECIFIED WHETHER ESOPHAGITIS PRESENT: ICD-10-CM

## 2023-09-07 RX ORDER — OMEPRAZOLE 40 MG/1
CAPSULE, DELAYED RELEASE ORAL
Qty: 30 CAPSULE | Refills: 3 | Status: SHIPPED | OUTPATIENT
Start: 2023-09-07 | End: 2024-02-15

## 2023-09-30 PROBLEM — I95.1 ORTHOSTATIC HYPOTENSION: Status: ACTIVE | Noted: 2023-09-30

## 2023-09-30 PROBLEM — R55 POSTURAL DIZZINESS WITH NEAR SYNCOPE: Status: ACTIVE | Noted: 2023-09-30

## 2023-09-30 PROBLEM — R42 POSTURAL DIZZINESS WITH NEAR SYNCOPE: Status: ACTIVE | Noted: 2023-09-30

## 2023-10-09 ENCOUNTER — TELEPHONE (OUTPATIENT)
Dept: CARDIOLOGY | Facility: CLINIC | Age: 33
End: 2023-10-09
Payer: MEDICAID

## 2024-02-14 DIAGNOSIS — K21.9 GASTROESOPHAGEAL REFLUX DISEASE, UNSPECIFIED WHETHER ESOPHAGITIS PRESENT: ICD-10-CM

## 2024-02-15 RX ORDER — OMEPRAZOLE 40 MG/1
CAPSULE, DELAYED RELEASE ORAL
Qty: 30 CAPSULE | Refills: 3 | Status: SHIPPED | OUTPATIENT
Start: 2024-02-15 | End: 2024-05-24

## 2024-03-07 ENCOUNTER — PATIENT MESSAGE (OUTPATIENT)
Dept: GASTROENTEROLOGY | Facility: CLINIC | Age: 34
End: 2024-03-07
Payer: MEDICAID

## 2024-03-08 ENCOUNTER — PATIENT MESSAGE (OUTPATIENT)
Dept: ENDOSCOPY | Facility: HOSPITAL | Age: 34
End: 2024-03-08
Payer: MEDICAID

## 2024-03-08 ENCOUNTER — TELEPHONE (OUTPATIENT)
Dept: ENDOSCOPY | Facility: HOSPITAL | Age: 34
End: 2024-03-08
Payer: MEDICAID

## 2024-03-08 VITALS — HEIGHT: 55 IN | BODY MASS INDEX: 36.57 KG/M2 | WEIGHT: 158 LBS

## 2024-03-08 DIAGNOSIS — R13.19 ESOPHAGEAL DYSPHAGIA: Primary | ICD-10-CM

## 2024-03-08 NOTE — TELEPHONE ENCOUNTER
"----- Message -----   From: Domingo Durand MD   Sent: 3/8/2024  12:54 PM CST   To: [facility]     Procedure: EGD     Diagnosis: Dysphagia     Procedure Timin-4 weeks     *If within 4 weeks selected, please olga as high priority*     *If greater than 12 weeks, please select "5-12 weeks" and delay sending until 3 months prior to requested date*     Provider: Any GI provider     Location: No Preference     Additional Scheduling Information: No scheduling concerns     Prep Specifications:Gastroparesis (Extended clear liquid)     Is the patient taking a GLP-1 Agonist:yes     Have you attached a patient to this message: yes   "

## 2024-03-08 NOTE — TELEPHONE ENCOUNTER
Spoke to patient to schedule procedure(s) Upper Endoscopy (EGD)       Physician to perform procedure(s) Dr. JUAN Carcamo  Date of Procedure (s) 3/18/24  Arrival Time 12:15 PM  Time of Procedure(s) 1:15 PM   Location of Procedure(s) [address]  Type of Rx Prep sent to patient: Other  Instructions provided to patient via [facility]    Patient was informed on the following information and verbalized understanding. Screening questionnaire reviewed with patient and complete. If procedure requires anesthesia, a responsible adult needs to be present to accompany the patient home, patient cannot drive after receiving anesthesia.If procedure requires anesthesia, a responsible adult needs to be present to accompany the patient home, patient cannot drive after receiving anesthesia. Appointment details are tentative, especially check-in time. Patient will receive a prep-op call 7 days prior to confirm check-in time for procedure. If applicable the patient should contact their pharmacy to verify Rx for procedure prep is ready for pick-up. Patient was advised to call the scheduling department at [phone] if pharmacy states no Rx is available. Patient was advised to call the endoscopy scheduling department if any questions or concerns arise.      [facility] Scheduling Department

## 2024-03-18 ENCOUNTER — HOSPITAL ENCOUNTER (OUTPATIENT)
Facility: HOSPITAL | Age: 34
Discharge: HOME | End: 2024-03-18
Attending: INTERNAL MEDICINE | Admitting: INTERNAL MEDICINE
Payer: MEDICAID

## 2024-03-18 ENCOUNTER — ANESTHESIA (OUTPATIENT)
Dept: ENDOSCOPY | Facility: HOSPITAL | Age: 34
End: 2024-03-18
Payer: MEDICAID

## 2024-03-18 ENCOUNTER — ANESTHESIA EVENT (OUTPATIENT)
Dept: ENDOSCOPY | Facility: HOSPITAL | Age: 34
End: 2024-03-18
Payer: MEDICAID

## 2024-03-18 VITALS
SYSTOLIC BLOOD PRESSURE: 118 MMHG | BODY MASS INDEX: 37.26 KG/M2 | WEIGHT: 161 LBS | OXYGEN SATURATION: 100 % | TEMPERATURE: 98 F | HEART RATE: 92 BPM | DIASTOLIC BLOOD PRESSURE: 59 MMHG | HEIGHT: 55 IN | RESPIRATION RATE: 20 BRPM

## 2024-03-18 DIAGNOSIS — R13.19 ESOPHAGEAL DYSPHAGIA: Primary | ICD-10-CM

## 2024-03-18 LAB
B-HCG UR QL: NEGATIVE
CTP QC/QA: YES

## 2024-03-18 PROCEDURE — 43249 ESOPH EGD DILATION <30 MM: CPT | Performed by: INTERNAL MEDICINE

## 2024-03-18 PROCEDURE — 25000003 PHARM REV CODE 250: Performed by: NURSE ANESTHETIST, CERTIFIED REGISTERED

## 2024-03-18 PROCEDURE — 63600175 PHARM REV CODE 636 W HCPCS: Performed by: NURSE ANESTHETIST, CERTIFIED REGISTERED

## 2024-03-18 PROCEDURE — 43249 ESOPH EGD DILATION <30 MM: CPT | Mod: ,,, | Performed by: INTERNAL MEDICINE

## 2024-03-18 PROCEDURE — 27201012 HC FORCEPS, HOT/COLD, DISP: Performed by: INTERNAL MEDICINE

## 2024-03-18 PROCEDURE — 37000008 HC ANESTHESIA 1ST 15 MINUTES: Performed by: INTERNAL MEDICINE

## 2024-03-18 PROCEDURE — C1726 CATH, BAL DIL, NON-VASCULAR: HCPCS | Performed by: INTERNAL MEDICINE

## 2024-03-18 PROCEDURE — D9220A PRA ANESTHESIA: Mod: ANES,,, | Performed by: ANESTHESIOLOGY

## 2024-03-18 PROCEDURE — D9220A PRA ANESTHESIA: Mod: CRNA,,, | Performed by: NURSE ANESTHETIST, CERTIFIED REGISTERED

## 2024-03-18 PROCEDURE — 81025 URINE PREGNANCY TEST: CPT | Performed by: INTERNAL MEDICINE

## 2024-03-18 PROCEDURE — 43239 EGD BIOPSY SINGLE/MULTIPLE: CPT | Mod: 59,,, | Performed by: INTERNAL MEDICINE

## 2024-03-18 PROCEDURE — 88312 SPECIAL STAINS GROUP 1: CPT | Performed by: STUDENT IN AN ORGANIZED HEALTH CARE EDUCATION/TRAINING PROGRAM

## 2024-03-18 PROCEDURE — 88305 TISSUE EXAM BY PATHOLOGIST: CPT | Performed by: STUDENT IN AN ORGANIZED HEALTH CARE EDUCATION/TRAINING PROGRAM

## 2024-03-18 PROCEDURE — 88312 SPECIAL STAINS GROUP 1: CPT | Mod: 26,,, | Performed by: STUDENT IN AN ORGANIZED HEALTH CARE EDUCATION/TRAINING PROGRAM

## 2024-03-18 PROCEDURE — 88305 TISSUE EXAM BY PATHOLOGIST: CPT | Mod: 26,,, | Performed by: STUDENT IN AN ORGANIZED HEALTH CARE EDUCATION/TRAINING PROGRAM

## 2024-03-18 PROCEDURE — 43239 EGD BIOPSY SINGLE/MULTIPLE: CPT | Mod: 59 | Performed by: INTERNAL MEDICINE

## 2024-03-18 PROCEDURE — 37000009 HC ANESTHESIA EA ADD 15 MINS: Performed by: INTERNAL MEDICINE

## 2024-03-18 RX ORDER — ROCURONIUM BROMIDE 10 MG/ML
INJECTION, SOLUTION INTRAVENOUS
Status: DISCONTINUED | OUTPATIENT
Start: 2024-03-18 | End: 2024-03-18

## 2024-03-18 RX ORDER — PROPOFOL 10 MG/ML
VIAL (ML) INTRAVENOUS
Status: DISCONTINUED | OUTPATIENT
Start: 2024-03-18 | End: 2024-03-18

## 2024-03-18 RX ORDER — ONDANSETRON HYDROCHLORIDE 2 MG/ML
INJECTION, SOLUTION INTRAVENOUS
Status: DISCONTINUED | OUTPATIENT
Start: 2024-03-18 | End: 2024-03-18

## 2024-03-18 RX ORDER — SODIUM CHLORIDE 9 MG/ML
INJECTION, SOLUTION INTRAVENOUS CONTINUOUS PRN
Status: DISCONTINUED | OUTPATIENT
Start: 2024-03-18 | End: 2024-03-18

## 2024-03-18 RX ORDER — LIDOCAINE HYDROCHLORIDE 20 MG/ML
INJECTION INTRAVENOUS
Status: DISCONTINUED | OUTPATIENT
Start: 2024-03-18 | End: 2024-03-18

## 2024-03-18 RX ORDER — FENTANYL CITRATE 50 UG/ML
INJECTION, SOLUTION INTRAMUSCULAR; INTRAVENOUS
Status: DISCONTINUED | OUTPATIENT
Start: 2024-03-18 | End: 2024-03-18

## 2024-03-18 RX ORDER — SODIUM CHLORIDE 9 MG/ML
INJECTION, SOLUTION INTRAVENOUS CONTINUOUS
Status: DISCONTINUED | OUTPATIENT
Start: 2024-03-18 | End: 2024-03-18 | Stop reason: HOSPADM

## 2024-03-18 RX ORDER — SUCCINYLCHOLINE CHLORIDE 20 MG/ML
INJECTION INTRAMUSCULAR; INTRAVENOUS
Status: DISCONTINUED | OUTPATIENT
Start: 2024-03-18 | End: 2024-03-18

## 2024-03-18 RX ORDER — DEXAMETHASONE SODIUM PHOSPHATE 4 MG/ML
INJECTION, SOLUTION INTRA-ARTICULAR; INTRALESIONAL; INTRAMUSCULAR; INTRAVENOUS; SOFT TISSUE
Status: DISCONTINUED | OUTPATIENT
Start: 2024-03-18 | End: 2024-03-18

## 2024-03-18 RX ADMIN — FENTANYL CITRATE 50 MCG: 50 INJECTION, SOLUTION INTRAMUSCULAR; INTRAVENOUS at 02:03

## 2024-03-18 RX ADMIN — SODIUM CHLORIDE: 0.9 INJECTION, SOLUTION INTRAVENOUS at 01:03

## 2024-03-18 RX ADMIN — ROCURONIUM BROMIDE 5 MG: 10 INJECTION, SOLUTION INTRAVENOUS at 02:03

## 2024-03-18 RX ADMIN — LIDOCAINE HYDROCHLORIDE 50 MG: 20 INJECTION INTRAVENOUS at 02:03

## 2024-03-18 RX ADMIN — SUCCINYLCHOLINE CHLORIDE 160 MG: 20 INJECTION, SOLUTION INTRAMUSCULAR; INTRAVENOUS at 02:03

## 2024-03-18 RX ADMIN — ONDANSETRON 4 MG: 2 INJECTION INTRAMUSCULAR; INTRAVENOUS at 02:03

## 2024-03-18 RX ADMIN — DEXAMETHASONE SODIUM PHOSPHATE 4 MG: 4 INJECTION, SOLUTION INTRAMUSCULAR; INTRAVENOUS at 02:03

## 2024-03-18 RX ADMIN — PROPOFOL 150 MG: 10 INJECTION, EMULSION INTRAVENOUS at 02:03

## 2024-03-18 NOTE — ANESTHESIA PROCEDURE NOTES
Intubation    Date/Time: 3/18/2024 2:16 PM    Performed by: Karla Andersen CRNA  Authorized by: Rebel Greer MD    Intubation:     Induction:  Intravenous    Intubated:  Postinduction    Mask Ventilation:  Easy mask    Attempts:  1    Attempted By:  CRNA    Method of Intubation:  Video laryngoscopy    Blade:  Jiménez 3    Laryngeal View Grade: Grade I - full view of cords      Difficult Airway Encountered?: No      Complications:  None    Airway Device:  Oral endotracheal tube    Airway Device Size:  7.0    Style/Cuff Inflation:  Cuffed (inflated to minimal occlusive pressure)    Inflation Amount (mL):  7    Tube secured:  19    Secured at:  The teeth    Placement Verified By:  Capnometry    Complicating Factors:  Obesity    Findings Post-Intubation:  BS equal bilateral and atraumatic/condition of teeth unchanged

## 2024-03-18 NOTE — TRANSFER OF CARE
"Anesthesia Transfer of Care Note    Patient: Mallory Abadie Bradbury    Procedure(s) Performed: Procedure(s) (LRB):  EGD (ESOPHAGOGASTRODUODENOSCOPY) (N/A)    Patient location: [facility]    Anesthesia Type: general    Transport from OR: Transported from OR on 6-10 L/min O2 by face mask with adequate spontaneous ventilation    Post pain: adequate analgesia    Post assessment: no apparent anesthetic complications and tolerated procedure well    Post vital signs: stable    Level of consciousness: lethargic and responds to stimulation    Nausea/Vomiting: no nausea/vomiting    Complications: none    Transfer of care protocol was followed      Last vitals: Visit Vitals  BP (!) 122/59   Pulse 76   Temp 36.7 °C (98.1 °F) (Temporal)   Resp 16   Ht 4' 5" (1.346 m)   Wt 73 kg (161 lb)   SpO2 100%   Breastfeeding No   BMI 40.30 kg/m²     "

## 2024-03-18 NOTE — PROVATION PATIENT INSTRUCTIONS
Discharge Summary/Instructions after an Endoscopic Procedure  Patient Name: Danae Ruff  Patient MRN: [id number]  Patient YOB: 1990 Monday, March 18, 2024  Augustin Carcamo MD  Dear patient,  As a result of recent federal legislation (The Federal Cures Act), you may   receive lab or pathology results from your procedure in your [facility]   account before your physician is able to contact you. Your physician or   their representative will relay the results to you with their   recommendations at their soonest availability.  Thank you,  RESTRICTIONS:  During your procedure today, you received medications for sedation.  These   medications may affect your judgment, balance and coordination.  Therefore,   for 24 hours, you have the following restrictions:   - DO NOT drive a car, operate machinery, make legal/financial decisions,   sign important papers or drink alcohol.    ACTIVITY:  Today: no heavy lifting, straining or running due to procedural   sedation/anesthesia.  The following day: return to full activity including work.  DIET:  Eat and drink normally unless instructed otherwise.     TREATMENT FOR COMMON SIDE EFFECTS:  - Mild abdominal pain, nausea, belching, bloating or excessive gas:  rest,   eat lightly and use a heating pad.  - Sore Throat: treat with throat lozenges and/or gargle with warm salt   water.  - Because air was used during the procedure, expelling large amounts of air   from your rectum or belching is normal.  - If a bowel prep was taken, you may not have a bowel movement for 1-3 days.    This is normal.  SYMPTOMS TO WATCH FOR AND REPORT TO YOUR PHYSICIAN:  1. Abdominal pain or bloating, other than gas cramps.  2. Chest pain.  3. Back pain.  4. Signs of infection such as: chills or fever occurring within 24 hours   after the procedure.  5. Rectal bleeding, which would show as bright red, maroon, or black stools.   (A tablespoon of blood from the rectum is not serious, especially  if   hemorrhoids are present.)  6. Vomiting.  7. Weakness or dizziness.  GO DIRECTLY TO THE NEAREST EMERGENCY ROOM IF YOU HAVE ANY OF THE FOLLOWING:      Difficulty breathing              Chills and/or fever over 101 F   Persistent vomiting and/or vomiting blood   Severe abdominal pain   Severe chest pain   Black, tarry stools   Bleeding- more than one tablespoon   Any other symptom or condition that you feel may need urgent attention  Your doctor recommends these additional instructions:  If any biopsies were taken, your doctors clinic will contact you in 1 to 2   weeks with any results.  - Discharge patient to home.   - Patient has a contact number available for emergencies.  The signs and   symptoms of potential delayed complications were discussed with the   patient.  Return to normal activities tomorrow.  Written discharge   instructions were provided to the patient.   - Resume previous diet.   - Continue present medications.   - Await pathology results.   - Repeat upper endoscopy PRN for retreatment.   - Return to referring physician.  For questions, problems or results please call your physician - Augustin Carcamo MD at Work:  [phone].  [facility], EMERGENCY ROOM PHONE NUMBER: [phone]  IF A COMPLICATION OR EMERGENCY SITUATION ARISES AND YOU ARE UNABLE TO REACH   YOUR PHYSICIAN - GO DIRECTLY TO THE EMERGENCY ROOM.  Augustin Carcamo MD  3/18/2024 2:49:49 PM  This report has been verified and signed electronically.  Dear patient,  As a result of recent federal legislation (The Federal Cures Act), you may   receive lab or pathology results from your procedure in your [facility]   account before your physician is able to contact you. Your physician or   their representative will relay the results to you with their   recommendations at their soonest availability.  Thank you,  PROVATION

## 2024-03-18 NOTE — ANESTHESIA PREPROCEDURE EVALUATION
[facility]  Anesthesia Pre-Operative Evaluation         Patient Name/: Mallory Abadie Bradbury, 1990  MRN: [id number]    SUBJECTIVE:     Pre-operative evaluation for Procedure(s) (LRB):  EGD (ESOPHAGOGASTRODUODENOSCOPY) (N/A)     2024    Mallory Abadie Bradbury is a 33 y.o. female     Patient now presents for the above procedure(s).    ________________________________________  No results found for this or any previous visit.    ________________________________________    LDA:        Drips:       Patient Active Problem List   Diagnosis    Bilateral foot pain    Corn or callus - Right Foot    Dysphagia    Iron deficiency    Aspiration pneumonia of left lower lobe due to gastric secretions    Coffee ground emesis    Depressive disorder    Hiatal hernia    Complete trisomy 21 syndrome    Disease of thyroid gland    Body mass index (BMI) of 40.0-44.9 in adult    Vitamin B12 deficiency (non anemic)    Stricture of esophagus    Postural dizziness with near syncope    Orthostatic hypotension       Review of patient's allergies indicates:   Allergen Reactions    Amoxicillin Hives    Augmentin [amoxicillin-pot clavulanate] Rash    Bactrim [sulfamethoxazole-trimethoprim] Rash    Duricef [cefadroxil] Rash    Keflex [cephalexin] Rash    Rifampin Rash       Current Inpatient Medications:       No current facility-administered medications on file prior to encounter.     Current Outpatient Medications on File Prior to Encounter   Medication Sig Dispense Refill    escitalopram oxalate (LEXAPRO) 20 MG tablet Take 1 tablet by mouth once daily.      levothyroxine (SYNTHROID) 75 MCG tablet Take 1 tablet (75 mcg total) by mouth every Tuesday, Thursday, Saturday, . 16 tablet 11    levothyroxine (SYNTHROID) 88 MCG tablet Take 1 tablet (88 mcg total) by mouth every Mon, Wed, Fri. 12 tablet 11    medroxyPROGESTERone (DEPO-PROVERA) 150 mg/mL injection   2    midodrine (PROAMATINE)  5 MG Tab Take 1 tablet (5 mg total) by mouth 2 (two) times daily with meals. 60 tablet 11    omeprazole (PRILOSEC) 40 MG capsule TAKE 1 CAPSULE BY MOUTH 45MINUTES BEFORE BREAKFAST 30 capsule 3    semaglutide (OZEMPIC) 0.25 mg or 0.5 mg (2 mg/3 mL) pen injector Inject 0.5 mg into the skin every 7 days.      [DISCONTINUED] budesonide 1 mg/2 mL NbSp budesonide 2 mg/day, usually in divided dose of 1 mg/2mL budesonide mixed with 5 g sucralose orally for 8 weeks. (Patient not taking: No sig reported) 100 mL 0    [DISCONTINUED] cetirizine (ZYRTEC) 1 mg/mL syrup Take by mouth once daily.      [DISCONTINUED] estradiol cypionate (DEPO-ESTRADIOL) 5 mg/mL injection Inject into the muscle every 28 days.      [DISCONTINUED] folic acid (FOLVITE) 1 MG tablet TAKE 1 TABLET(1 MG) BY MOUTH EVERY DAY 30 tablet 11    [DISCONTINUED] pantoprazole (PROTONIX) 40 MG tablet Take 1 tablet (40 mg total) by mouth before breakfast. 90 tablet 3       Past Surgical History:   Procedure Laterality Date    APPENDECTOMY      COLONOSCOPY N/A 8/28/2020    Procedure: COLONOSCOPY;  Surgeon: Domingo Durand MD;  Location: [facility] ([address] [place] [address]);  Service: Endoscopy;  Laterality: N/A;  Please schedule in the 2nd floor for airway protection body habitus down syndrome     COVID test at [place] on 8/25/20-GT    ESOPHAGOGASTRODUODENOSCOPY N/A 1/27/2020    Procedure: EGD (ESOPHAGOGASTRODUODENOSCOPY);  Surgeon: Violette Garrett MD;  Location: [facility];  Service: Endoscopy;  Laterality: N/A;    ESOPHAGOGASTRODUODENOSCOPY N/A 7/14/2020    Procedure: EGD (ESOPHAGOGASTRODUODENOSCOPY);  Surgeon: Domingo Durand MD;  Location: [address]);  Service: Endoscopy;  Laterality: N/A;  Urgent EGD 2nd floor for airway protection due to difficult intubation airway anatomy for esophageal dilation.  Please schedule her next week 2nd floor for dysphagia.     COVID test at [place] on 7/11/20-GT    ESOPHAGOGASTRODUODENOSCOPY N/A 8/28/2020    Procedure: EGD  (ESOPHAGOGASTRODUODENOSCOPY);  Surgeon: Domingo Durand MD;  Location: [facility] (2ND FLR);  Service: Endoscopy;  Laterality: N/A;  per Dr Kiser-add EGD to colonoscopy order  8/26-pt's mother confirmed appt-MS    ESOPHAGOGASTRODUODENOSCOPY N/A 2/9/2021    Procedure: EGD (ESOPHAGOGASTRODUODENOSCOPY);  Surgeon: Domingo Durand MD;  Location: [facility] (2ND FLR);  Service: Endoscopy;  Laterality: N/A;  Please schedule patient 2nd floor 1st available provider for esophageal dysphagia and history of it difficult intubation in airway per her father.  Patient with Down syndrome is here with her father he states that she has been having trouble swallowing    ESOPHAGOGASTRODUODENOSCOPY N/A 6/29/2021    Procedure: EGD (ESOPHAGOGASTRODUODENOSCOPY);  Surgeon: Ryan Floyd MD;  Location: [facility] (2ND FLR);  Service: Endoscopy;  Laterality: N/A;  dysphagia needs dialation     Please schedule patient 2nd floor 1st available provider for esophageal dysphagia and history of it difficult intubation in airway per her father.  Patient with Down syndrome. mother requesting ASAP  fully vaccinated-GT    ESOPHAGOGASTRODUODENOSCOPY N/A 4/13/2022    Procedure: EGD (ESOPHAGOGASTRODUODENOSCOPY);  Surgeon: Domingo Durand MD;  Location: [facility] (2ND FLR);  Service: Endoscopy;  Laterality: N/A;  Dysphagia please evaluate for esophageal dilation     Please schedule patient 2nd floor 1st available provider for esophageal dysphagia and history of it difficult intubation in airway per her father.  Patient with Down syndrome. mother requesting ASAP    ESOPHAGOGASTRODUODENOSCOPY N/A 3/9/2023    Procedure: ESOPHAGOGASTRODUODENOSCOPY (EGD);  Surgeon: Ryan Floyd MD;  Location: [facility] (2ND FLR);  Service: Endoscopy;  Laterality: N/A;  hx of difficult intubation. down's syndrome  Endoscopy schedulers please schedule Danae for an urgent 1st MD available for EGD 2nd floor for airway protection for dysphagia likely will need  esophageal dilation.  Thank you  instr portal-GT    esposgus      TONGUE SURGERY      TONSILLECTOMY         Social History:  Tobacco Use: Low Risk  (12/15/2023)    Patient History     Smoking Tobacco Use: Never     Smokeless Tobacco Use: Never     Passive Exposure: Not on file       Alcohol Use: Not At Risk (9/30/2023)    AUDIT-C     Frequency of Alcohol Consumption: Never     Average Number of Drinks: Not on file     Frequency of Binge Drinking: Not on file       OBJECTIVE:     Vital Signs Range:  BMI Readings from Last 1 Encounters:   03/08/24 38.10 kg/m²               Significant Labs:        Component Value Date/Time    WBC 6.29 10/01/2023 0437    HGB 15.2 10/01/2023 0437    HCT 47.4 10/01/2023 0437     10/01/2023 0437     10/01/2023 0437     11/09/2018 0900    K 4.2 10/01/2023 0437     10/01/2023 0437     11/09/2018 0900    CO2 20 (L) 10/01/2023 0437     10/01/2023 0437    GLU 85 11/09/2018 0900    BUN 11 10/01/2023 0437    CREATININE 0.9 10/01/2023 0437    CREATININE 1.23 11/09/2018 0900    MG 2.3 09/30/2023 0644    PHOS 2.6 (L) 09/30/2023 0644    CALCIUM 8.4 (L) 10/01/2023 0437    ALBUMIN 2.5 (L) 10/01/2023 0437    ALBUMIN 3.8 05/04/2018 0953    PROT 5.4 (L) 10/01/2023 0437    ALKPHOS 102 10/01/2023 0437    BILITOT 0.2 10/01/2023 0437    AST 15 10/01/2023 0437    ALT 17 10/01/2023 0437    INR 1.0 08/18/2020 1035        Please see Results Review for additional labs.     Diagnostic Studies: No relevant studies.    EKG:   Results for orders placed or performed during the hospital encounter of 04/13/22   EKG 12-lead    Collection Time: 04/13/22  6:46 PM    Narrative    Test Reason : R00.0,    Vent. Rate : 064 BPM     Atrial Rate : 064 BPM     P-R Int : 112 ms          QRS Dur : 082 ms      QT Int : 402 ms       P-R-T Axes : 022 064 042 degrees     QTc Int : 414 ms    Sinus rhythm with sinus arrhythmia  Normal ECG  When compared with ECG of 25-NOV-2019 08:02,  No  significant change was found  Confirmed by CELIA SEBASTIAN MD ([id number]) on 4/14/2022 11:14:47 AM    Referred By: KENDRICK   SELF           Confirmed By:CELIA SEBASTIAN MD       ECHO:  See subjective, if available.      ASSESSMENT/PLAN:                                                                                                                  03/18/2024  Mallory Abadie Bradbury is a 33 y.o., female.      Pre-op Assessment          Review of Systems  Anesthesia Hx:    Difficult intubation              Social:  Non-Smoker       Pulmonary:  Pneumonia                      Hepatic/GI:    Hiatal Hernia, GERD             Neurological:    Neuromuscular Disease,                                   Endocrine:        Obesity / BMI > 30  Psych:  Psychiatric History            Prev airway (2/9/2021):    Induction:  Intravenous    Intubated:  Postinduction    Mask Ventilation:  Easy mask    Attempts:  1    Attempted By:  CRNA    Method of Intubation:  Video laryngoscopy    Blade:  Jiménez 3    Laryngeal View Grade: Grade I - full view of chords      Difficult Airway Encountered?: No      Complications:  None    Airway Device:  Oral endotracheal tube    Airway Device Size:  7.0    Style/Cuff Inflation:  Cuffed (inflated to minimal occlusive pressure)    Inflation Amount (mL):  5    Secured at:  The lips    Placement Verified By:  Capnometry    Complicating Factors:  None    Findings Post-Intubation:  BS equal bilatera    Physical Exam  General: Alert and Oriented    Airway:  Mallampati: III   Mouth Opening: Normal  TM Distance: Normal  Tongue: Large  Neck ROM: Normal ROM    Dental:  Intact  Any loose and/or missing teeth verified with patient       Anesthesia Plan  Type of Anesthesia, risks & benefits discussed:    Intra-op Monitoring Plan: Standard ASA Monitors  Induction:  IV  Informed Consent: Informed consent signed with the Patient representative and all parties understand the risks and agree with anesthesia plan.  All  questions answered.   ASA Score: 3  Day of Surgery Review of History & Physical: H&P Update referred to the surgeon/provider.    Ready For Surgery From Anesthesia Perspective.     .

## 2024-03-18 NOTE — H&P
Short Stay Endoscopy History and Physical    PCP - Lizzie Ernst NP    Procedure - EGD  ASA - per anesthesia  Mallampati - per anesthesia  History of Anesthesia problems - no  Family history Anesthesia problems -  no   Plan of anesthesia - General/MAC    HPI:  This is a 33 y.o. female here for evaluation of dysphagia.         ROS:  Constitutional: No fevers, chills  CV: No chest pain  Pulm: No cough, No shortness of breath  Ophtho: No vision changes  GI: see HPI    Medical History:  has a past medical history of Difficult intubation, Down's syndrome, Esophageal stenosis, GERD (gastroesophageal reflux disease), Hiatal hernia, and Thyroid disease.    Surgical History:  has a past surgical history that includes Tonsillectomy; Appendectomy; Tongue surgery; esposgus; Esophagogastroduodenoscopy (N/A, 1/27/2020); Esophagogastroduodenoscopy (N/A, 7/14/2020); Esophagogastroduodenoscopy (N/A, 8/28/2020); Colonoscopy (N/A, 8/28/2020); Esophagogastroduodenoscopy (N/A, 2/9/2021); Esophagogastroduodenoscopy (N/A, 6/29/2021); Esophagogastroduodenoscopy (N/A, 4/13/2022); and Esophagogastroduodenoscopy (N/A, 3/9/2023).    Family History: family history includes Colon cancer in her maternal cousin; Colon cancer (age of onset: 60) in her paternal grandfather.. Otherwise no colon cancer, inflammatory bowel disease, or GI malignancies.    Social History:  reports that she has never smoked. She has never used smokeless tobacco. She reports that she does not drink alcohol and does not use drugs.    Review of patient's allergies indicates:   Allergen Reactions    Amoxicillin Hives    Augmentin [amoxicillin-pot clavulanate] Rash    Bactrim [sulfamethoxazole-trimethoprim] Rash    Duricef [cefadroxil] Rash    Keflex [cephalexin] Rash    Rifampin Rash       Medications:   Medications Prior to Admission   Medication Sig Dispense Refill Last Dose    escitalopram oxalate (LEXAPRO) 20 MG tablet Take 1 tablet by mouth once daily.    3/17/2024    levothyroxine (SYNTHROID) 75 MCG tablet Take 1 tablet (75 mcg total) by mouth every Tuesday, Thursday, Saturday, Sunday. 16 tablet 11 3/17/2024    levothyroxine (SYNTHROID) 88 MCG tablet Take 1 tablet (88 mcg total) by mouth every Mon, Wed, Fri. 12 tablet 11 3/17/2024    midodrine (PROAMATINE) 5 MG Tab Take 1 tablet (5 mg total) by mouth 2 (two) times daily with meals. 60 tablet 11 3/17/2024    omeprazole (PRILOSEC) 40 MG capsule TAKE 1 CAPSULE BY MOUTH 45MINUTES BEFORE BREAKFAST 30 capsule 3 3/17/2024    medroxyPROGESTERone (DEPO-PROVERA) 150 mg/mL injection   2     semaglutide (OZEMPIC) 0.25 mg or 0.5 mg (2 mg/3 mL) pen injector Inject 0.5 mg into the skin every 7 days.   3/8/2024       Physical Exam:    Vital Signs:   Vitals:    03/18/24 1327   BP: 116/63   Pulse: 65   Resp: 16   Temp: 98.2 °F (36.8 °C)       General Appearance: Well appearing in no acute distress  Eyes:    No scleral icterus  Lungs: CTA anteriorly  Heart:  Regular rate and rhythm   Abdomen: Soft, non tender, non distended with normal bowel sounds.      Labs:  Lab Results   Component Value Date    WBC 6.29 10/01/2023    HGB 15.2 10/01/2023    HCT 47.4 10/01/2023     (H) 10/01/2023     10/01/2023        BMP  Lab Results   Component Value Date     10/01/2023    K 4.2 10/01/2023     10/01/2023    CO2 20 (L) 10/01/2023    BUN 11 10/01/2023    CREATININE 0.9 10/01/2023    CALCIUM 8.4 (L) 10/01/2023    ANIONGAP 10 10/01/2023    ESTGFRAFRICA >60.0 04/19/2022    EGFRNONAA >60.0 04/19/2022     Lab Results   Component Value Date    INR 1.0 08/18/2020          Assessment:  33 y.o. female with dysphagia.     Plan:  Proceed with EGD today.  I have explained the risks and benefits of endoscopy procedures to the patient including but not limited to bleeding, perforation, infection, and death.  All questions answered.      Augustin Carcamo MD

## 2024-03-19 NOTE — ANESTHESIA POSTPROCEDURE EVALUATION
Anesthesia Post Evaluation    Patient: Mallory Abadie Bradbury    Procedure(s) Performed: Procedure(s) (LRB):  EGD (ESOPHAGOGASTRODUODENOSCOPY) (N/A)    Final Anesthesia Type: general      Patient location during evaluation: PACU  Level of consciousness: awake and alert  Post-procedure vital signs: reviewed and stable  Pain management: adequate  Airway patency: patent    PONV status at discharge: No PONV  Anesthetic complications: no      Cardiovascular status: blood pressure returned to baseline  Respiratory status: unassisted  Hydration status: euvolemic  Follow-up not needed.              Vitals Value Taken Time   /59 03/18/24 1516   Temp 36.7 °C (98.1 °F) 03/18/24 1447   Pulse 92 03/18/24 1530   Resp 20 03/18/24 1530   SpO2 100 % 03/18/24 1530         No case tracking events are documented in the log.      Pain/Tere Score: No data recorded

## 2024-03-25 LAB
FINAL PATHOLOGIC DIAGNOSIS: NORMAL
GROSS: NORMAL
Lab: NORMAL

## 2024-04-27 NOTE — PLAN OF CARE
4 Eyes Skin Assessment     The patient is being assess for  Admission    I agree that 2 RN's have performed a thorough Head to Toe Skin Assessment on the patient. ALL assessment sites listed below have been assessed.       Areas assessed by both nurses: Angela and   [x]   Head, Face, and Ears   [x]   Shoulders, Back, and Chest  [x]   Arms, Elbows, and Hands   [x]   Coccyx, Sacrum, and IschIum  [x]   Legs, Feet, and Heels        Does the Patient have Skin Breakdown?  No         Darien Prevention initiated:  No   Wound Care Orders initiated:  No      [facility] nurse consulted for Pressure Injury (Stage 3,4, Unstageable, DTI, NWPT, and Complex wounds), New and Established Ostomies:  No      Nurse 1 eSignature: Electronically signed by Leyla Merlos RN on 4/27/24 at 11:07 AM EDT    **SHARE this note so that the co-signing nurse is able to place an eSignature**    Nurse 2 eSignature: {Esignature:[id number]}             Patient is stable and ready for discharge. Instructions  given to patient and family. Questions answered. Patient tolerating po liquids with no difficulty. Patient has no pain or states it is a tolerable level for them. Anesthesia consent and surgical consent in chart.

## 2024-05-06 ENCOUNTER — LAB VISIT (OUTPATIENT)
Dept: LAB | Facility: HOSPITAL | Age: 34
End: 2024-05-06
Attending: INTERNAL MEDICINE
Payer: MEDICAID

## 2024-05-06 DIAGNOSIS — I51.9 MYXEDEMA HEART DISEASE: Primary | ICD-10-CM

## 2024-05-06 DIAGNOSIS — E03.9 MYXEDEMA HEART DISEASE: Primary | ICD-10-CM

## 2024-05-06 LAB
T4 FREE SERPL-MCNC: 0.75 NG/DL (ref 0.71–1.51)
TSH SERPL DL<=0.005 MIU/L-ACNC: 0.53 UIU/ML (ref 0.4–4)

## 2024-05-06 PROCEDURE — 84439 ASSAY OF FREE THYROXINE: CPT | Performed by: INTERNAL MEDICINE

## 2024-05-06 PROCEDURE — 84443 ASSAY THYROID STIM HORMONE: CPT | Performed by: INTERNAL MEDICINE

## 2024-05-06 PROCEDURE — 36415 COLL VENOUS BLD VENIPUNCTURE: CPT | Performed by: INTERNAL MEDICINE

## 2024-05-24 DIAGNOSIS — K21.9 GASTROESOPHAGEAL REFLUX DISEASE, UNSPECIFIED WHETHER ESOPHAGITIS PRESENT: ICD-10-CM

## 2024-05-24 RX ORDER — OMEPRAZOLE 40 MG/1
CAPSULE, DELAYED RELEASE ORAL
Qty: 30 CAPSULE | Refills: 3 | Status: SHIPPED | OUTPATIENT
Start: 2024-05-24

## 2024-06-03 ENCOUNTER — TELEPHONE (OUTPATIENT)
Dept: PODIATRY | Facility: CLINIC | Age: 34
End: 2024-06-03
Payer: MEDICAID

## 2024-06-03 NOTE — TELEPHONE ENCOUNTER
----- Message from Dalila Rankin sent at 6/3/2024  1:37 PM CDT -----  Regarding: Sooner Appointment Request  Contact: patient's mom at [phone]  Type:  Sooner Appointment Request    Name of Caller:  patient's mom at [phone]    When is the first available appointment?  none  Symptoms:  callous on bottom of left foot    Additional Information:  Please call and advise. Thank you

## 2024-06-19 ENCOUNTER — OFFICE VISIT (OUTPATIENT)
Dept: PODIATRY | Facility: CLINIC | Age: 34
End: 2024-06-19
Payer: MEDICAID

## 2024-06-19 VITALS — BODY MASS INDEX: 37.85 KG/M2 | HEIGHT: 55 IN | WEIGHT: 163.56 LBS

## 2024-06-19 DIAGNOSIS — M79.672 LEFT FOOT PAIN: ICD-10-CM

## 2024-06-19 DIAGNOSIS — L85.1 ACQUIRED KERATODERMA: Primary | ICD-10-CM

## 2024-06-19 PROCEDURE — 99999 PR PBB SHADOW E&M-EST. PATIENT-LVL III: CPT | Mod: PBBFAC,,, | Performed by: PODIATRIST

## 2024-06-19 PROCEDURE — 99213 OFFICE O/P EST LOW 20 MIN: CPT | Mod: PBBFAC,PN | Performed by: PODIATRIST

## 2024-06-19 PROCEDURE — 3008F BODY MASS INDEX DOCD: CPT | Mod: CPTII,,, | Performed by: PODIATRIST

## 2024-06-19 PROCEDURE — 1159F MED LIST DOCD IN RCRD: CPT | Mod: CPTII,,, | Performed by: PODIATRIST

## 2024-06-19 PROCEDURE — 99213 OFFICE O/P EST LOW 20 MIN: CPT | Mod: S$PBB,,, | Performed by: PODIATRIST

## 2024-06-19 PROCEDURE — 1160F RVW MEDS BY RX/DR IN RCRD: CPT | Mod: CPTII,,, | Performed by: PODIATRIST

## 2024-06-19 NOTE — PROGRESS NOTES
"  [address] [facility] [address]  [place] [address]  Phone: [phone]   [phone]    Patient's PCP:Lizzie Ernst NP  Referring Provider: No ref. provider found    Subjective:      Chief Complaint:: Callouses (Left ball of foot callous)    HPI Mallory Abadie Bradbury is a 34 y.o. female who presents today with a complaint of Callouses Left ball of foot callus. The current episode started 6 weeks ago.  The symptoms include aching, sharp pain. Probable cause of complaint unknown.  The symptoms are aggravated by pressure, walking. The problem has stayed the same. Treatment to date have included pedicures, soaking which provided some relief.         Vitals:    06/19/24 1414   Weight: 74.2 kg (163 lb 9.3 oz)   Height: 4' 5" (1.346 m)   PainSc:   6      Shoe Size: 3    Past Surgical History:   Procedure Laterality Date    APPENDECTOMY      COLONOSCOPY N/A 8/28/2020    Procedure: COLONOSCOPY;  Surgeon: Domingo Durand MD;  Location: [facility] ([address] [place] [address]);  Service: Endoscopy;  Laterality: N/A;  Please schedule in the 2nd floor for airway protection body habitus down syndrome     COVID test at [place] on 8/25/20-GT    ESOPHAGOGASTRODUODENOSCOPY N/A 1/27/2020    Procedure: EGD (ESOPHAGOGASTRODUODENOSCOPY);  Surgeon: Violette Garrett MD;  Location: [facility];  Service: Endoscopy;  Laterality: N/A;    ESOPHAGOGASTRODUODENOSCOPY N/A 7/14/2020    Procedure: EGD (ESOPHAGOGASTRODUODENOSCOPY);  Surgeon: Domingo Durand MD;  Location: [facility] ([address] [place] [address]);  Service: Endoscopy;  Laterality: N/A;  Urgent EGD 2nd floor for airway protection due to difficult intubation airway anatomy for esophageal dilation.  Please schedule her next week 2nd floor for dysphagia.     COVID test at [place] on 7/11/20-GT    ESOPHAGOGASTRODUODENOSCOPY N/A 8/28/2020    Procedure: EGD (ESOPHAGOGASTRODUODENOSCOPY);  Surgeon: Domingo Durand MD;  Location: [facility] (2ND FLR);  Service: Endoscopy;  Laterality: N/A;  per Dr" Rashel-add EGD to colonoscopy order  8/26-pt's mother confirmed appt-MS    ESOPHAGOGASTRODUODENOSCOPY N/A 2/9/2021    Procedure: EGD (ESOPHAGOGASTRODUODENOSCOPY);  Surgeon: Domingo Durand MD;  Location: [facility] (2ND FLR);  Service: Endoscopy;  Laterality: N/A;  Please schedule patient 2nd floor 1st available provider for esophageal dysphagia and history of it difficult intubation in airway per her father.  Patient with Down syndrome is here with her father he states that she has been having trouble swallowing    ESOPHAGOGASTRODUODENOSCOPY N/A 6/29/2021    Procedure: EGD (ESOPHAGOGASTRODUODENOSCOPY);  Surgeon: Ryan Floyd MD;  Location: [facility] (2ND FLR);  Service: Endoscopy;  Laterality: N/A;  dysphagia needs dialation     Please schedule patient 2nd floor 1st available provider for esophageal dysphagia and history of it difficult intubation in airway per her father.  Patient with Down syndrome. mother requesting ASAP  fully vaccinated-GT    ESOPHAGOGASTRODUODENOSCOPY N/A 4/13/2022    Procedure: EGD (ESOPHAGOGASTRODUODENOSCOPY);  Surgeon: Domingo Durand MD;  Location: [facility] (2ND FLR);  Service: Endoscopy;  Laterality: N/A;  Dysphagia please evaluate for esophageal dilation     Please schedule patient 2nd floor 1st available provider for esophageal dysphagia and history of it difficult intubation in airway per her father.  Patient with Down syndrome. mother requesting ASAP    ESOPHAGOGASTRODUODENOSCOPY N/A 3/9/2023    Procedure: ESOPHAGOGASTRODUODENOSCOPY (EGD);  Surgeon: Ryan Floyd MD;  Location: [facility] (2ND FLR);  Service: Endoscopy;  Laterality: N/A;  hx of difficult intubation. down's syndrome  Endoscopy schedulers please schedule Danae for an urgent 1st MD available for EGD 2nd floor for airway protection for dysphagia likely will need esophageal dilation.  Thank you  instr portal-GT    ESOPHAGOGASTRODUODENOSCOPY N/A 3/18/2024    Procedure: EGD (ESOPHAGOGASTRODUODENOSCOPY);   Surgeon: Augustin aCrcamo MD;  Location: [facility] ([address] [place] [address]);  Service: Endoscopy;  Laterality: N/A;  woods-downs syndrome-gastroporesis diet-ozempic-tb-last dose 3/8/24    esposgus      TONGUE SURGERY      TONSILLECTOMY       Past Medical History:   Diagnosis Date    Difficult intubation     per dad     Down's syndrome     Esophageal stenosis     GERD (gastroesophageal reflux disease)     Hiatal hernia     Thyroid disease      Family History   Problem Relation Name Age of Onset    Colon cancer Paternal Grandfather  60    Colon cancer Maternal Cousin      Esophageal cancer Neg Hx      Celiac disease Neg Hx      Cirrhosis Neg Hx      Colon polyps Neg Hx          Social History:   Marital Status: Single  Alcohol History:  reports no history of alcohol use.  Tobacco History:  reports that she has never smoked. She has never used smokeless tobacco.  Drug History:  reports no history of drug use.    Review of patient's allergies indicates:   Allergen Reactions    Amoxicillin Hives    Augmentin [amoxicillin-pot clavulanate] Rash    Bactrim [sulfamethoxazole-trimethoprim] Rash    Duricef [cefadroxil] Rash    Keflex [cephalexin] Rash    Rifampin Rash       Current Outpatient Medications   Medication Sig Dispense Refill    levothyroxine (SYNTHROID) 75 MCG tablet Take 1 tablet (75 mcg total) by mouth every Tuesday, Thursday, Saturday, Sunday. 16 tablet 11    escitalopram oxalate (LEXAPRO) 20 MG tablet Take 1 tablet by mouth once daily.      levothyroxine (SYNTHROID) 88 MCG tablet Take 1 tablet (88 mcg total) by mouth every Mon, Wed, Fri. (Patient not taking: Reported on 6/19/2024) 12 tablet 11    medroxyPROGESTERone (DEPO-PROVERA) 150 mg/mL injection   2    midodrine (PROAMATINE) 5 MG Tab Take 1 tablet (5 mg total) by mouth 2 (two) times daily with meals. 60 tablet 11    omeprazole (PRILOSEC) 40 MG capsule TAKE 1 CAPSULE BY MOUTH 45 MINUTES BEFORE BREAKFAST 30 capsule 3    semaglutide (OZEMPIC) 0.25 mg or 0.5 mg (2 mg/3  mL) pen injector Inject 0.5 mg into the skin every 7 days.       No current facility-administered medications for this visit.       Review of Systems   Constitutional:  Negative for chills, fatigue, fever and unexpected weight change.   HENT:  Negative for hearing loss and trouble swallowing.    Eyes:  Negative for photophobia and visual disturbance.   Respiratory:  Negative for cough, shortness of breath and wheezing.    Cardiovascular:  Negative for chest pain, palpitations and leg swelling.   Gastrointestinal:  Negative for abdominal pain and nausea.   Genitourinary:  Negative for dysuria and frequency.   Musculoskeletal:  Negative for arthralgias, back pain, gait problem, joint swelling, myalgias and neck pain.   Skin:  Negative for rash and wound.   Neurological:  Negative for tremors, seizures, weakness, numbness and headaches.   Hematological:  Does not bruise/bleed easily.         Objective:        Physical Exam:   Foot Exam    General  General Appearance: appears stated age and healthy   Orientation: alert and oriented to person, place, and time   Affect: appropriate   Gait: unimpaired       Left Foot/Ankle      Inspection and Palpation  Ecchymosis: none  Tenderness: great toe metatarsophalangeal joint   Swelling: none   Arch: normal  Hammertoes: absent  Claw toes: absent  Hallux valgus: no  Hallux limitus: no  Skin Exam: callus; no drainage, no ulcer and no erythema   Neurovascular  Dorsalis pedis: 2+  Posterior tibial: 2+  Capillary refill: 2+  Varicose veins: not present  Saphenous nerve sensation: normal  Tibial nerve sensation: normal  Superficial peroneal nerve sensation: normal  Deep peroneal nerve sensation: normal  Sural nerve sensation: normal    Muscle Strength  Ankle dorsiflexion: 5  Ankle plantar flexion: 5  Ankle inversion: 5  Ankle eversion: 5  Great toe extension: 5  Great toe flexion: 5    Range of Motion    Normal left ankle ROM    Tests  Anterior drawer: negative   Talar tilt: negative    PT Tinel's sign: negative  Paresthesia: negative      Physical Exam  Cardiovascular:      Pulses:           Dorsalis pedis pulses are 2+ on the left side.        Posterior tibial pulses are 2+ on the left side.   Musculoskeletal:      Left foot: No bunion.   Feet:      Left foot:      Skin integrity: Callus present. No ulcer or erythema.               Left Ankle/Foot Exam     Tenderness   The patient is tender to palpation of the great toe metatarsophalangeal joint.    Range of Motion   The patient has normal left ankle ROM.       Muscle Strength   Left Lower Extremity   Ankle Dorsiflexion:  5   Plantar flexion:  5/5     Vascular Exam       Left Pulses  Dorsalis Pedis:      2+  Posterior Tibial:      2+           Imaging: none            Assessment:       1. Acquired keratoderma    2. Left foot pain      Plan:   Acquired keratoderma    Left foot pain      Follow up if symptoms worsen or fail to improve.    Procedures        Left foot calluses trimmed as a courtesy.  Patient tolerated well.  Recommend pumice stone urea cream for continued management.      Counseling:     I provided patient education verbally regarding:   Patient diagnosis, treatment options, as well as alternatives, risks, and benefits.     This note was created using Dragon voice recognition software that occasionally misinterpreted phrases or words.

## 2024-09-04 ENCOUNTER — PATIENT MESSAGE (OUTPATIENT)
Dept: GASTROENTEROLOGY | Facility: CLINIC | Age: 34
End: 2024-09-04
Payer: MEDICAID

## 2024-09-06 ENCOUNTER — TELEPHONE (OUTPATIENT)
Dept: ENDOSCOPY | Facility: HOSPITAL | Age: 34
End: 2024-09-06
Payer: MEDICAID

## 2024-09-06 VITALS — WEIGHT: 170 LBS | HEIGHT: 55 IN | BODY MASS INDEX: 39.34 KG/M2

## 2024-09-06 DIAGNOSIS — R13.10 DYSPHAGIA, UNSPECIFIED TYPE: Primary | ICD-10-CM

## 2024-09-06 NOTE — TELEPHONE ENCOUNTER
Spoke to Danae vela  to schedule procedure(s) Upper Endoscopy (EGD)       Physician to perform procedure(s) Dr. ARMANDO Durand  Date of Procedure (s) 10/11/24  Arrival Time 9:30 AM  Time of Procedure(s) 10:30 AM   Location of Procedure(s) [place] 2nd Floor  Type of Rx Prep sent to patient: Other  Instructions provided to patient via [facility]    Patient was informed on the following information and verbalized understanding. Screening questionnaire reviewed with patient and complete. If procedure requires anesthesia, a responsible adult needs to be present to accompany the patient home, patient cannot drive after receiving anesthesia. Appointment details are tentative, especially check-in time. Patient will receive a prep-op call 7 days prior to confirm check-in time for procedure. If applicable the patient should contact their pharmacy to verify Rx for procedure prep is ready for pick-up. Patient was advised to call the scheduling department at [phone] if pharmacy states no Rx is available. Patient was advised to call the endoscopy scheduling department if any questions or concerns arise.      [facility] Scheduling Department

## 2024-09-06 NOTE — TELEPHONE ENCOUNTER
"  Procedure: EGD    Diagnosis: Dysphagia    Procedure Timing: Within 1-4 weeks (Urgent) 1st MD available    *If within 4 weeks selected, please olga as high priority*    *If greater than 12 weeks, please select "5-12 weeks" and delay sending until 3 months prior to requested date*    Location: Hospital Based ([facility],  endo, [facility] [facility])    Additional Scheduling Information:  2nd floor for airway protection    Prep Specifications:Gastroparesis (Extended clear liquid)    Is the patient taking a GLP-1 Agonist:yes    Have you attached a patient to this message: yes     "

## 2024-09-23 ENCOUNTER — LAB VISIT (OUTPATIENT)
Dept: LAB | Facility: HOSPITAL | Age: 34
End: 2024-09-23
Attending: INTERNAL MEDICINE
Payer: MEDICAID

## 2024-09-23 DIAGNOSIS — E03.9 MYXEDEMA HEART DISEASE: Primary | ICD-10-CM

## 2024-09-23 DIAGNOSIS — I51.9 MYXEDEMA HEART DISEASE: Primary | ICD-10-CM

## 2024-09-23 DIAGNOSIS — K21.9 GASTROESOPHAGEAL REFLUX DISEASE, UNSPECIFIED WHETHER ESOPHAGITIS PRESENT: ICD-10-CM

## 2024-09-23 LAB
T4 FREE SERPL-MCNC: 1.01 NG/DL (ref 0.71–1.51)
TSH SERPL DL<=0.005 MIU/L-ACNC: 2.02 UIU/ML (ref 0.4–4)

## 2024-09-23 PROCEDURE — 84439 ASSAY OF FREE THYROXINE: CPT | Performed by: INTERNAL MEDICINE

## 2024-09-23 PROCEDURE — 36415 COLL VENOUS BLD VENIPUNCTURE: CPT | Performed by: INTERNAL MEDICINE

## 2024-09-23 PROCEDURE — 84443 ASSAY THYROID STIM HORMONE: CPT | Performed by: INTERNAL MEDICINE

## 2024-09-23 RX ORDER — OMEPRAZOLE 40 MG/1
CAPSULE, DELAYED RELEASE ORAL
Qty: 30 CAPSULE | Refills: 3 | Status: SHIPPED | OUTPATIENT
Start: 2024-09-23

## 2024-10-04 ENCOUNTER — PATIENT MESSAGE (OUTPATIENT)
Dept: ENDOSCOPY | Facility: HOSPITAL | Age: 34
End: 2024-10-04
Payer: MEDICAID

## 2024-10-04 ENCOUNTER — TELEPHONE (OUTPATIENT)
Dept: ENDOSCOPY | Facility: HOSPITAL | Age: 34
End: 2024-10-04
Payer: MEDICAID

## 2024-10-04 NOTE — TELEPHONE ENCOUNTER
Spoke to mom for pre-call to confirm scheduled Upper Endoscopy (EGD) and patient verbalized understanding of the following:       Date of Procedure (s)  verified 10/11/24  Arrival Time 9:15 AM verified.  Location of Procedure(s) [address] Floor verified.  NPO status reinforced. Ok to continue clear liquids up until 4 hours prior to the Endoscopy procedure. Gastroparesis diet    Confirmed Pt is currently taking Ozempic (Semaglutide), Pt instructed to stop stop taking Ozempic (Semaglutide) on 10/3/24    Confirmed last dose 9/27/24.     Pt  mom confirmed receipt of prep instructions and Rx prep (if applicable).  Instructions provided to patient via [facility]  Pt  mom confirmed ride home after procedure if procedure requires anesthesia.   Pre-call screening questionnaire reviewed and completed with patient. mom  Appointment details are tentative, including check-in time.  If the patient begins taking any blood thinning medications, injectable weight loss/diabetes medications (other than insulin), or Adipex (phentermine) patient was instructed to contact the endoscopy scheduling department as soon as possible.  Patient  mom was advised to call the endoscopy scheduling department if any questions or concerns arise.       [facility] Scheduling Department

## 2024-10-10 ENCOUNTER — ANESTHESIA EVENT (OUTPATIENT)
Dept: ENDOSCOPY | Facility: HOSPITAL | Age: 34
End: 2024-10-10
Payer: MEDICAID

## 2024-10-11 ENCOUNTER — ANESTHESIA (OUTPATIENT)
Dept: ENDOSCOPY | Facility: HOSPITAL | Age: 34
End: 2024-10-11
Payer: MEDICAID

## 2024-10-11 ENCOUNTER — HOSPITAL ENCOUNTER (OUTPATIENT)
Facility: HOSPITAL | Age: 34
Discharge: HOME | End: 2024-10-11
Attending: INTERNAL MEDICINE | Admitting: INTERNAL MEDICINE
Payer: MEDICAID

## 2024-10-11 VITALS
WEIGHT: 170 LBS | HEIGHT: 55 IN | TEMPERATURE: 98 F | DIASTOLIC BLOOD PRESSURE: 63 MMHG | RESPIRATION RATE: 27 BRPM | HEART RATE: 67 BPM | SYSTOLIC BLOOD PRESSURE: 87 MMHG | BODY MASS INDEX: 39.34 KG/M2 | OXYGEN SATURATION: 100 %

## 2024-10-11 DIAGNOSIS — K21.9 GASTROESOPHAGEAL REFLUX DISEASE, UNSPECIFIED WHETHER ESOPHAGITIS PRESENT: ICD-10-CM

## 2024-10-11 DIAGNOSIS — R13.10 DYSPHAGIA: ICD-10-CM

## 2024-10-11 PROCEDURE — 37000009 HC ANESTHESIA EA ADD 15 MINS: Performed by: INTERNAL MEDICINE

## 2024-10-11 PROCEDURE — 43249 ESOPH EGD DILATION <30 MM: CPT | Mod: ,,, | Performed by: INTERNAL MEDICINE

## 2024-10-11 PROCEDURE — C1726 CATH, BAL DIL, NON-VASCULAR: HCPCS | Performed by: INTERNAL MEDICINE

## 2024-10-11 PROCEDURE — A4216 STERILE WATER/SALINE, 10 ML: HCPCS | Performed by: NURSE ANESTHETIST, CERTIFIED REGISTERED

## 2024-10-11 PROCEDURE — 37000008 HC ANESTHESIA 1ST 15 MINUTES: Performed by: INTERNAL MEDICINE

## 2024-10-11 PROCEDURE — 25000003 PHARM REV CODE 250: Performed by: NURSE ANESTHETIST, CERTIFIED REGISTERED

## 2024-10-11 PROCEDURE — 63600175 PHARM REV CODE 636 W HCPCS: Performed by: NURSE ANESTHETIST, CERTIFIED REGISTERED

## 2024-10-11 PROCEDURE — 43249 ESOPH EGD DILATION <30 MM: CPT | Performed by: INTERNAL MEDICINE

## 2024-10-11 RX ORDER — DEXAMETHASONE SODIUM PHOSPHATE 4 MG/ML
INJECTION, SOLUTION INTRA-ARTICULAR; INTRALESIONAL; INTRAMUSCULAR; INTRAVENOUS; SOFT TISSUE
Status: DISCONTINUED | OUTPATIENT
Start: 2024-10-11 | End: 2024-10-11

## 2024-10-11 RX ORDER — ONDANSETRON HYDROCHLORIDE 2 MG/ML
INJECTION, SOLUTION INTRAVENOUS
Status: DISCONTINUED | OUTPATIENT
Start: 2024-10-11 | End: 2024-10-11

## 2024-10-11 RX ORDER — SODIUM CHLORIDE 0.9 % (FLUSH) 0.9 %
SYRINGE (ML) INJECTION
Status: DISCONTINUED | OUTPATIENT
Start: 2024-10-11 | End: 2024-10-11

## 2024-10-11 RX ORDER — FENTANYL CITRATE 50 UG/ML
25 INJECTION, SOLUTION INTRAMUSCULAR; INTRAVENOUS EVERY 5 MIN PRN
Status: DISCONTINUED | OUTPATIENT
Start: 2024-10-11 | End: 2024-10-11 | Stop reason: HOSPADM

## 2024-10-11 RX ORDER — PROPOFOL 10 MG/ML
VIAL (ML) INTRAVENOUS
Status: DISCONTINUED | OUTPATIENT
Start: 2024-10-11 | End: 2024-10-11

## 2024-10-11 RX ORDER — SUCCINYLCHOLINE CHLORIDE 20 MG/ML
INJECTION INTRAMUSCULAR; INTRAVENOUS
Status: DISCONTINUED | OUTPATIENT
Start: 2024-10-11 | End: 2024-10-11

## 2024-10-11 RX ORDER — FENTANYL CITRATE 50 UG/ML
INJECTION, SOLUTION INTRAMUSCULAR; INTRAVENOUS
Status: DISCONTINUED | OUTPATIENT
Start: 2024-10-11 | End: 2024-10-11

## 2024-10-11 RX ORDER — GLUCAGON 1 MG
1 KIT INJECTION
Status: DISCONTINUED | OUTPATIENT
Start: 2024-10-11 | End: 2024-10-11 | Stop reason: HOSPADM

## 2024-10-11 RX ORDER — ONDANSETRON HYDROCHLORIDE 2 MG/ML
4 INJECTION, SOLUTION INTRAVENOUS DAILY PRN
Status: DISCONTINUED | OUTPATIENT
Start: 2024-10-11 | End: 2024-10-11 | Stop reason: HOSPADM

## 2024-10-11 RX ORDER — SODIUM CHLORIDE 9 MG/ML
INJECTION, SOLUTION INTRAVENOUS CONTINUOUS
Status: DISCONTINUED | OUTPATIENT
Start: 2024-10-11 | End: 2024-10-11 | Stop reason: HOSPADM

## 2024-10-11 RX ORDER — ROCURONIUM BROMIDE 10 MG/ML
INJECTION, SOLUTION INTRAVENOUS
Status: DISCONTINUED | OUTPATIENT
Start: 2024-10-11 | End: 2024-10-11

## 2024-10-11 RX ORDER — LIDOCAINE HYDROCHLORIDE 20 MG/ML
INJECTION INTRAVENOUS
Status: DISCONTINUED | OUTPATIENT
Start: 2024-10-11 | End: 2024-10-11

## 2024-10-11 RX ORDER — OMEPRAZOLE 40 MG/1
40 CAPSULE, DELAYED RELEASE ORAL
Qty: 90 CAPSULE | Refills: 3 | Status: SHIPPED | OUTPATIENT
Start: 2024-10-11 | End: 2025-10-11

## 2024-10-11 RX ADMIN — SODIUM CHLORIDE 20 ML: 9 INJECTION, SOLUTION INTRAMUSCULAR; INTRAVENOUS; SUBCUTANEOUS at 10:10

## 2024-10-11 RX ADMIN — ROCURONIUM BROMIDE 5 MG: 10 INJECTION INTRAVENOUS at 10:10

## 2024-10-11 RX ADMIN — LIDOCAINE HYDROCHLORIDE 60 MG: 20 INJECTION INTRAVENOUS at 10:10

## 2024-10-11 RX ADMIN — PROPOFOL 150 MCG/KG/MIN: 10 INJECTION, EMULSION INTRAVENOUS at 10:10

## 2024-10-11 RX ADMIN — FENTANYL CITRATE 25 MCG: 50 INJECTION, SOLUTION INTRAMUSCULAR; INTRAVENOUS at 10:10

## 2024-10-11 RX ADMIN — ONDANSETRON 4 MG: 2 INJECTION INTRAMUSCULAR; INTRAVENOUS at 11:10

## 2024-10-11 RX ADMIN — DEXAMETHASONE SODIUM PHOSPHATE 4 MG: 4 INJECTION, SOLUTION INTRAMUSCULAR; INTRAVENOUS at 11:10

## 2024-10-11 RX ADMIN — PROPOFOL 100 MG: 10 INJECTION, EMULSION INTRAVENOUS at 10:10

## 2024-10-11 RX ADMIN — SUCCINYLCHOLINE CHLORIDE 140 MG: 20 INJECTION, SOLUTION INTRAMUSCULAR; INTRAVENOUS at 10:10

## 2024-10-11 NOTE — ANESTHESIA POSTPROCEDURE EVALUATION
Anesthesia Post Evaluation    Patient: Mallory Abadie Bradbury    Procedure(s) Performed: Procedure(s) (LRB):  EGD (ESOPHAGOGASTRODUODENOSCOPY) (N/A)    Final Anesthesia Type: general      Patient location during evaluation: PACU  Patient participation: Yes- Able to Participate  Level of consciousness: awake and alert  Post-procedure vital signs: reviewed and stable  Pain management: adequate  Airway patency: patent    PONV status at discharge: No PONV  Anesthetic complications: no      Cardiovascular status: hemodynamically stable  Respiratory status: spontaneous ventilation  Follow-up not needed.              Vitals Value Taken Time   BP 87/61 10/11/24 1157   Temp 36.6 °C (97.9 °F) 10/11/24 1145   Pulse 73 10/11/24 1158   Resp 20 10/11/24 1155   SpO2 100 % 10/11/24 1158   Vitals shown include unfiled device data.      No case tracking events are documented in the log.      Pain/Tere Score: Tere Score: 10 (10/11/2024 11:38 AM)

## 2024-10-11 NOTE — TRANSFER OF CARE
"Anesthesia Transfer of Care Note    Patient: Mallory Abadie Bradbury    Procedure(s) Performed: Procedure(s) (LRB):  EGD (ESOPHAGOGASTRODUODENOSCOPY) (N/A)    Patient location: [facility]    Anesthesia Type: general    Transport from OR: Transported from OR on room air with adequate spontaneous ventilation    Post pain: adequate analgesia    Post assessment: no apparent anesthetic complications and tolerated procedure well    Post vital signs: stable    Level of consciousness: awake and alert    Nausea/Vomiting: no nausea/vomiting    Complications: none    Transfer of care protocol was followed    Last vitals: Visit Vitals  BP (!) 88/51   Pulse 73   Temp 36.7 °C (98 °F) (Temporal)   Resp 14   Ht 4' 5" (1.346 m)   Wt 77.1 kg (170 lb)   SpO2 100%   Breastfeeding No   BMI 42.55 kg/m²     "

## 2024-10-11 NOTE — PROVATION PATIENT INSTRUCTIONS
Discharge Summary/Instructions after an Endoscopic Procedure  Patient Name: Danae Ruff  Patient MRN: [id number]  Patient YOB: 1990 Friday, October 11, 2024  Domingo Durand MD  Dear patient,  As a result of recent federal legislation (The Federal Cures Act), you may   receive lab or pathology results from your procedure in your [facility]   account before your physician is able to contact you. Your physician or   their representative will relay the results to you with their   recommendations at their soonest availability.  Thank you,  RESTRICTIONS:  During your procedure today, you received medications for sedation.  These   medications may affect your judgment, balance and coordination.  Therefore,   for 24 hours, you have the following restrictions:   - DO NOT drive a car, operate machinery, make legal/financial decisions,   sign important papers or drink alcohol.    ACTIVITY:  Today: no heavy lifting, straining or running due to procedural   sedation/anesthesia.  The following day: return to full activity including work.  DIET:  Eat and drink normally unless instructed otherwise.     TREATMENT FOR COMMON SIDE EFFECTS:  - Mild abdominal pain, nausea, belching, bloating or excessive gas:  rest,   eat lightly and use a heating pad.  - Sore Throat: treat with throat lozenges and/or gargle with warm salt   water.  - Because air was used during the procedure, expelling large amounts of air   from your rectum or belching is normal.  - If a bowel prep was taken, you may not have a bowel movement for 1-3 days.    This is normal.  SYMPTOMS TO WATCH FOR AND REPORT TO YOUR PHYSICIAN:  1. Abdominal pain or bloating, other than gas cramps.  2. Chest pain.  3. Back pain.  4. Signs of infection such as: chills or fever occurring within 24 hours   after the procedure.  5. Rectal bleeding, which would show as bright red, maroon, or black stools.   (A tablespoon of blood from the rectum is not serious, especially  if   hemorrhoids are present.)  6. Vomiting.  7. Weakness or dizziness.  GO DIRECTLY TO THE NEAREST EMERGENCY ROOM IF YOU HAVE ANY OF THE FOLLOWING:      Difficulty breathing              Chills and/or fever over 101 F   Persistent vomiting and/or vomiting blood   Severe abdominal pain   Severe chest pain   Black, tarry stools   Bleeding- more than one tablespoon   Any other symptom or condition that you feel may need urgent attention  Your doctor recommends these additional instructions:  If any biopsies were taken, your doctors clinic will contact you in 1 to 2   weeks with any results.  - Discharge patient to home.   - Follow an antireflux regimen indefinitely.   - Use a proton pump inhibitor PO daily.   - The findings and recommendations were discussed with the patient.   - The findings and recommendations were discussed with the patient's   family.  For questions, problems or results please call your physician - Domingo Durand MD at Work:  [phone].  [facility], EMERGENCY ROOM PHONE NUMBER: [phone]  IF A COMPLICATION OR EMERGENCY SITUATION ARISES AND YOU ARE UNABLE TO REACH   YOUR PHYSICIAN - GO DIRECTLY TO THE EMERGENCY ROOM.  Domingo Durand MD  10/11/2024 11:25:03 AM  This report has been verified and signed electronically.  Dear patient,  As a result of recent federal legislation (The Federal Cures Act), you may   receive lab or pathology results from your procedure in your [facility]   account before your physician is able to contact you. Your physician or   their representative will relay the results to you with their   recommendations at their soonest availability.  Thank you,  PROVATION

## 2024-10-11 NOTE — ANESTHESIA PREPROCEDURE EVALUATION
10/11/2024  Mallory Abadie Bradbury is a 34 y.o., female.  Pre-operative evaluation for Procedure(s) (LRB):  EGD (ESOPHAGOGASTRODUODENOSCOPY) (N/A)    Mallory Abadie Bradbury is a 34 y.o. female   Had discussion with patient's parents at bedside. She is unable to provide a urine sample for UPT. Patient's parents are aware of risk of anesthesia if patient were in fact pregnant, which they deny there to be a chance of. Will proceed.     Patient Active Problem List   Diagnosis    Bilateral foot pain    Corn or callus - Right Foot    Dysphagia    Iron deficiency    Aspiration pneumonia of left lower lobe due to gastric secretions    Coffee ground emesis    Depressive disorder    Hiatal hernia    Complete trisomy 21 syndrome    Disease of thyroid gland    Body mass index (BMI) of 40.0-44.9 in adult    Vitamin B12 deficiency (non anemic)    Stricture of esophagus    Postural dizziness with near syncope    Orthostatic hypotension       Past Surgical History:   Procedure Laterality Date    APPENDECTOMY      COLONOSCOPY N/A 8/28/2020    Procedure: COLONOSCOPY;  Surgeon: Domingo Durand MD;  Location: [facility] ([address] [place] [address]);  Service: Endoscopy;  Laterality: N/A;  Please schedule in the 2nd floor for airway protection body habitus down syndrome     COVID test at [place] on 8/25/20-GT    ESOPHAGOGASTRODUODENOSCOPY N/A 1/27/2020    Procedure: EGD (ESOPHAGOGASTRODUODENOSCOPY);  Surgeon: Violette Garrett MD;  Location: [facility];  Service: Endoscopy;  Laterality: N/A;    ESOPHAGOGASTRODUODENOSCOPY N/A 7/14/2020    Procedure: EGD (ESOPHAGOGASTRODUODENOSCOPY);  Surgeon: Domingo Durand MD;  Location: [facility] ([address] [place] [address]);  Service: Endoscopy;  Laterality: N/A;  Urgent EGD 2nd floor for airway protection due to difficult intubation airway anatomy for esophageal dilation.  Please schedule her next week 2nd floor for  dysphagia.     COVID test at [place] on 7/11/20-GT    ESOPHAGOGASTRODUODENOSCOPY N/A 8/28/2020    Procedure: EGD (ESOPHAGOGASTRODUODENOSCOPY);  Surgeon: Domingo Durand MD;  Location: [facility] (2ND FLR);  Service: Endoscopy;  Laterality: N/A;  per Dr Kiser-add EGD to colonoscopy order  8/26-pt's mother confirmed appt-MS    ESOPHAGOGASTRODUODENOSCOPY N/A 2/9/2021    Procedure: EGD (ESOPHAGOGASTRODUODENOSCOPY);  Surgeon: Domingo Durand MD;  Location: [facility] (2ND FLR);  Service: Endoscopy;  Laterality: N/A;  Please schedule patient 2nd floor 1st available provider for esophageal dysphagia and history of it difficult intubation in airway per her father.  Patient with Down syndrome is here with her father he states that she has been having trouble swallowing    ESOPHAGOGASTRODUODENOSCOPY N/A 6/29/2021    Procedure: EGD (ESOPHAGOGASTRODUODENOSCOPY);  Surgeon: Ryan Floyd MD;  Location: [facility] (2ND FLR);  Service: Endoscopy;  Laterality: N/A;  dysphagia needs dialation     Please schedule patient 2nd floor 1st available provider for esophageal dysphagia and history of it difficult intubation in airway per her father.  Patient with Down syndrome. mother requesting ASAP  fully vaccinated-GT    ESOPHAGOGASTRODUODENOSCOPY N/A 4/13/2022    Procedure: EGD (ESOPHAGOGASTRODUODENOSCOPY);  Surgeon: Domingo Durand MD;  Location: [facility] (2ND FLR);  Service: Endoscopy;  Laterality: N/A;  Dysphagia please evaluate for esophageal dilation     Please schedule patient 2nd floor 1st available provider for esophageal dysphagia and history of it difficult intubation in airway per her father.  Patient with Down syndrome. mother requesting ASAP    ESOPHAGOGASTRODUODENOSCOPY N/A 3/9/2023    Procedure: ESOPHAGOGASTRODUODENOSCOPY (EGD);  Surgeon: Ryan Floyd MD;  Location: [facility] (2ND FLR);  Service: Endoscopy;  Laterality: N/A;  hx of difficult intubation. down's syndrome  Endoscopy schedulers please schedule  Danae for an urgent 1st MD available for EGD 2nd floor for airway protection for dysphagia likely will need esophageal dilation.  Thank you  instr portal-GT    ESOPHAGOGASTRODUODENOSCOPY N/A 3/18/2024    Procedure: EGD (ESOPHAGOGASTRODUODENOSCOPY);  Surgeon: Augustin Carcamo MD;  Location: [facility] ([address] [place] [address]);  Service: Endoscopy;  Laterality: N/A;  woods-downs syndrome-gastroporesis diet-ozempic-tb-last dose 3/8/24    esposgus      TONGUE SURGERY      TONSILLECTOMY         Social History     Socioeconomic History    Marital status: Single   Tobacco Use    Smoking status: Never    Smokeless tobacco: Never   Substance and Sexual Activity    Alcohol use: No    Drug use: Never    Sexual activity: Never   Social History Narrative    Patient with Down syndrome     Social Drivers of Health     Financial Resource Strain: Low Risk  (9/30/2023)    Overall Financial Resource Strain (CARDIA)     Difficulty of Paying Living Expenses: Not hard at all   Food Insecurity: No Food Insecurity (9/30/2023)    Hunger Vital Sign     Worried About Running Out of Food in the Last Year: Never true     Ran Out of Food in the Last Year: Never true   Transportation Needs: No Transportation Needs (9/30/2023)    PRAPARE - Transportation     Lack of Transportation (Medical): No     Lack of Transportation (Non-Medical): No   Stress: No Stress Concern Present (9/30/2023)    [employer] [employer] - Occupational Stress Questionnaire     Feeling of Stress : Not at all   Housing Stability: High Risk (9/30/2023)    Housing Stability Vital Sign     Unable to Pay for Housing in the Last Year: Yes     Number of Places Lived in the Last Year: 1     Unstable Housing in the Last Year: No       No current facility-administered medications on file prior to encounter.     Current Outpatient Medications on File Prior to Encounter   Medication Sig Dispense Refill    escitalopram oxalate (LEXAPRO) 20 MG tablet Take 1 tablet by mouth once  "daily.      levothyroxine (SYNTHROID) 75 MCG tablet Take 1 tablet (75 mcg total) by mouth every Tuesday, Thursday, Saturday, Sunday. 16 tablet 11    levothyroxine (SYNTHROID) 88 MCG tablet Take 1 tablet (88 mcg total) by mouth every Mon, Wed, Fri. (Patient not taking: Reported on 6/19/2024) 12 tablet 11    medroxyPROGESTERone (DEPO-PROVERA) 150 mg/mL injection   2    midodrine (PROAMATINE) 5 MG Tab Take 1 tablet (5 mg total) by mouth 2 (two) times daily with meals. 60 tablet 11    semaglutide (OZEMPIC) 0.25 mg or 0.5 mg (2 mg/3 mL) pen injector Inject 0.5 mg into the skin every 7 days.      [DISCONTINUED] budesonide 1 mg/2 mL NbSp budesonide 2 mg/day, usually in divided dose of 1 mg/2mL budesonide mixed with 5 g sucralose orally for 8 weeks. (Patient not taking: No sig reported) 100 mL 0    [DISCONTINUED] cetirizine (ZYRTEC) 1 mg/mL syrup Take by mouth once daily.      [DISCONTINUED] estradiol cypionate (DEPO-ESTRADIOL) 5 mg/mL injection Inject into the muscle every 28 days.      [DISCONTINUED] folic acid (FOLVITE) 1 MG tablet TAKE 1 TABLET(1 MG) BY MOUTH EVERY DAY 30 tablet 11    [DISCONTINUED] pantoprazole (PROTONIX) 40 MG tablet Take 1 tablet (40 mg total) by mouth before breakfast. 90 tablet 3       Review of patient's allergies indicates:   Allergen Reactions    Amoxicillin Hives    Augmentin [amoxicillin-pot clavulanate] Rash    Bactrim [sulfamethoxazole-trimethoprim] Rash    Duricef [cefadroxil] Rash    Keflex [cephalexin] Rash    Rifampin Rash         CBC: No results for input(s): "WBC", "RBC", "HGB", "HCT", "PLT", "MCV", "MCH", "MCHC" in the last 72 hours.    CMP: No results for input(s): "NA", "K", "CL", "CO2", "BUN", "CREATININE", "GLU", "MG", "PHOS", "CALCIUM", "ALBUMIN", "PROT", "ALKPHOS", "ALT", "AST", "BILITOT" in the last 72 hours.    INR  No results for input(s): "PT", "INR", "PROTIME", "APTT" in the last 72 hours.      Diagnostic Studies:    EKG:   Results for orders placed or performed during the " "hospital encounter of 04/13/22   EKG 12-lead    Collection Time: 04/13/22  6:46 PM    Narrative    Test Reason : R00.0,    Vent. Rate : 064 BPM     Atrial Rate : 064 BPM     P-R Int : 112 ms          QRS Dur : 082 ms      QT Int : 402 ms       P-R-T Axes : 022 064 042 degrees     QTc Int : 414 ms    Sinus rhythm with sinus arrhythmia  Normal ECG  When compared with ECG of 25-NOV-2019 08:02,  No significant change was found  Confirmed by CELIA SEBASTIAN MD ([id number]) on 4/14/2022 11:14:47 AM    Referred By: [email or web address]   SELF           Confirmed By:CELIA SEBASTIAN MD       TTE:  No results found for this or any previous visit.  No results found for: "EF"   No results found for this or any previous visit.    ELVIE:  No results found for this or any previous visit.    Stress Test:  No results found for this or any previous visit.       LHC:  No results found for this or any previous visit.       PFT:  No results found for: "FEV1", "FVC", "OLT6OST", "TLC", "DLCO"     ALLERGIES:     Review of patient's allergies indicates:   Allergen Reactions    Amoxicillin Hives    Augmentin [amoxicillin-pot clavulanate] Rash    Bactrim [sulfamethoxazole-trimethoprim] Rash    Duricef [cefadroxil] Rash    Keflex [cephalexin] Rash    Rifampin Rash     LDA:          Lines/Drains/Airways       None                  Anesthesia Evaluation      Airway   Mallampati: II  TM distance: > 6 cm  Neck ROM: Normal ROM  Dental    (+) Intact    Pulmonary    Cardiovascular     Rate: Normal    Neuro/Psych      GI/Hepatic/Renal    (+) hiatal hernia, GERD    Endo/Other    Abdominal                           Pre-op Assessment    I have reviewed the Patient Summary Reports.     I have reviewed the Nursing Notes. I have reviewed the NPO Status.   I have reviewed the Medications.     Review of Systems  Anesthesia Hx:  No problems with previous Anesthesia             Denies Family Hx of Anesthesia complications.    Denies Personal Hx of Anesthesia complications.      "               Cardiovascular:  Cardiovascular Normal                                            Pulmonary:  Pulmonary Normal                       Renal/:  Renal/ Normal                 Hepatic/GI:    Hiatal Hernia, GERD             Neurological:  Neurology Normal                                      Endocrine:  Endocrine Normal                Physical Exam  General: Well nourished    Airway:  Mallampati: II   Mouth Opening: Normal  TM Distance: > 6 cm  Tongue: Normal  Neck ROM: Normal ROM    Dental:  Intact    Chest/Lungs:  Normal Respiratory Rate    Heart:  Rate: Normal        Anesthesia Plan  Type of Anesthesia, risks & benefits discussed:    Anesthesia Type: Gen ETT  Intra-op Monitoring Plan: Standard ASA Monitors  Post Op Pain Control Plan: multimodal analgesia and IV/PO Opioids PRN  Induction:  IV  Airway Plan: Direct, Post-Induction  Informed Consent: Informed consent signed with the Patient and all parties understand the risks and agree with anesthesia plan.  All questions answered. Patient consented to blood products? Yes  ASA Score: 3  Day of Surgery Review of History & Physical: H&P Update referred to the surgeon/provider.    Ready For Surgery From Anesthesia Perspective.     .

## 2024-10-11 NOTE — H&P
[facility] - Endoscopy  History & Physical    Subjective:      Chief Complaint/Reason for Admission:     Direct access EGD for esophageal dysphagia.    Mallory Abadie Bradbury is a 34 y.o. female.    Past Medical History:   Diagnosis Date    Difficult intubation     per dad     Down's syndrome     Esophageal stenosis     GERD (gastroesophageal reflux disease)     Hiatal hernia     Thyroid disease      Past Surgical History:   Procedure Laterality Date    APPENDECTOMY      COLONOSCOPY N/A 8/28/2020    Procedure: COLONOSCOPY;  Surgeon: Domingo Durand MD;  Location: [facility] ([facility]);  Service: Endoscopy;  Laterality: N/A;  Please schedule in the 2nd floor for airway protection body habitus down syndrome     COVID test at [place] on 8/25/20-GT    ESOPHAGOGASTRODUODENOSCOPY N/A 1/27/2020    Procedure: EGD (ESOPHAGOGASTRODUODENOSCOPY);  Surgeon: Violette Garrett MD;  Location: [facility];  Service: Endoscopy;  Laterality: N/A;    ESOPHAGOGASTRODUODENOSCOPY N/A 7/14/2020    Procedure: EGD (ESOPHAGOGASTRODUODENOSCOPY);  Surgeon: Domingo Durand MD;  Location: [facility] ([address] [place] [address]);  Service: Endoscopy;  Laterality: N/A;  Urgent EGD 2nd floor for airway protection due to difficult intubation airway anatomy for esophageal dilation.  Please schedule her next week 2nd floor for dysphagia.     COVID test at [facility] on 7/11/20-GT    ESOPHAGOGASTRODUODENOSCOPY N/A 8/28/2020    Procedure: EGD (ESOPHAGOGASTRODUODENOSCOPY);  Surgeon: Domingo Durand MD;  Location: [facility] ([address] [place] [address]);  Service: Endoscopy;  Laterality: N/A;  per Dr Kiser-add EGD to colonoscopy order  8/26-pt's mother confirmed appt-MS    ESOPHAGOGASTRODUODENOSCOPY N/A 2/9/2021    Procedure: EGD (ESOPHAGOGASTRODUODENOSCOPY);  Surgeon: Domingo Durand MD;  Location: [facility] (2ND FLR);  Service: Endoscopy;  Laterality: N/A;  Please schedule patient 2nd floor 1st available provider for esophageal dysphagia and history of it difficult intubation in airway  per her father.  Patient with Down syndrome is here with her father he states that she has been having trouble swallowing    ESOPHAGOGASTRODUODENOSCOPY N/A 6/29/2021    Procedure: EGD (ESOPHAGOGASTRODUODENOSCOPY);  Surgeon: Ryan Floyd MD;  Location: [facility] (2ND FLR);  Service: Endoscopy;  Laterality: N/A;  dysphagia needs dialation     Please schedule patient 2nd floor 1st available provider for esophageal dysphagia and history of it difficult intubation in airway per her father.  Patient with Down syndrome. mother requesting ASAP  fully vaccinated-GT    ESOPHAGOGASTRODUODENOSCOPY N/A 4/13/2022    Procedure: EGD (ESOPHAGOGASTRODUODENOSCOPY);  Surgeon: Domingo Woods MD;  Location: [facility] (2ND FLR);  Service: Endoscopy;  Laterality: N/A;  Dysphagia please evaluate for esophageal dilation     Please schedule patient 2nd floor 1st available provider for esophageal dysphagia and history of it difficult intubation in airway per her father.  Patient with Down syndrome. mother requesting ASAP    ESOPHAGOGASTRODUODENOSCOPY N/A 3/9/2023    Procedure: ESOPHAGOGASTRODUODENOSCOPY (EGD);  Surgeon: Ryan Floyd MD;  Location: [facility] (2ND FLR);  Service: Endoscopy;  Laterality: N/A;  hx of difficult intubation. down's syndrome  Endoscopy schedulers please schedule Danae for an urgent 1st MD available for EGD 2nd floor for airway protection for dysphagia likely will need esophageal dilation.  Thank you  instr portal-GT    ESOPHAGOGASTRODUODENOSCOPY N/A 3/18/2024    Procedure: EGD (ESOPHAGOGASTRODUODENOSCOPY);  Surgeon: Augustin Carcamo MD;  Location: [facility] (2ND FLR);  Service: Endoscopy;  Laterality: N/A;  woods-downs syndrome-gastroporesis diet-ozempic-tb-last dose 3/8/24    esposgus      TONGUE SURGERY      TONSILLECTOMY       Social History     Tobacco Use    Smoking status: Never    Smokeless tobacco: Never   Substance Use Topics    Alcohol use: No    Drug use: Never       PTA Medications    Medication Sig    escitalopram oxalate (LEXAPRO) 20 MG tablet Take 1 tablet by mouth once daily.    levothyroxine (SYNTHROID) 75 MCG tablet Take 1 tablet (75 mcg total) by mouth every Tuesday, Thursday, Saturday, Sunday.    midodrine (PROAMATINE) 5 MG Tab Take 1 tablet (5 mg total) by mouth 2 (two) times daily with meals.    omeprazole (PRILOSEC) 40 MG capsule TAKE 1 CAPSULE BY MOUTH 45 MINUTES BEFORE BREAKFAST    levothyroxine (SYNTHROID) 88 MCG tablet Take 1 tablet (88 mcg total) by mouth every Mon, Wed, Fri. (Patient not taking: Reported on 6/19/2024)    medroxyPROGESTERone (DEPO-PROVERA) 150 mg/mL injection     semaglutide (OZEMPIC) 0.25 mg or 0.5 mg (2 mg/3 mL) pen injector Inject 0.5 mg into the skin every 7 days.     Review of patient's allergies indicates:   Allergen Reactions    Amoxicillin Hives    Augmentin [amoxicillin-pot clavulanate] Rash    Bactrim [sulfamethoxazole-trimethoprim] Rash    Duricef [cefadroxil] Rash    Keflex [cephalexin] Rash    Rifampin Rash        Review of Systems   Constitutional:  Positive for fever.       Objective:      Vital Signs (Most Recent)  Temp: 98 °F (36.7 °C) (10/11/24 1010)  Pulse: 70 (10/11/24 1010)  Resp: 16 (10/11/24 1010)  BP: 113/64 (10/11/24 1010)  SpO2: 100 % (10/11/24 1010)    Vital Signs Range (Last 24H):  Temp:  [98 °F (36.7 °C)]   Pulse:  [70]   Resp:  [16]   BP: (113)/(64)   SpO2:  [100 %]     Physical Exam  Cardiovascular:      Rate and Rhythm: Normal rate.   Pulmonary:      Effort: Pulmonary effort is normal.   Neurological:      Mental Status: She is alert.           Assessment:      Direct access EGD for esophageal dysphagia.      Plan:    Direct access EGD for esophageal dysphagia.

## 2024-10-11 NOTE — PROGRESS NOTES
Plan of care reviewed with parents, both verbalized understanding, pt progressing with plan of care, no nausea, mild sore throat, tolerating PO, reviewed all DC instructions, home meds, scripts, when to call MD, when to follow-up, answered questions.

## 2024-10-11 NOTE — ANESTHESIA PROCEDURE NOTES
Intubation    Date/Time: 10/11/2024 10:54 AM    Performed by: Columba Valverde CRNA  Authorized by: Kristin oWng MD    Intubation:     Induction:  Rapid sequence induction    Intubated:  Postinduction    Mask Ventilation:  Not attempted    Attempts:  1    Attempted By:  CRNA    Method of Intubation:  Video laryngoscopy    Blade:  Jiménez 3    Laryngeal View Grade: Grade I - full view of cords      Difficult Airway Encountered?: No      Complications:  None    Airway Device:  Oral endotracheal tube    Airway Device Size:  7.0    Style/Cuff Inflation:  Cuffed (inflated to minimal occlusive pressure)    Tube secured:  19    Secured at:  The lips    Placement Verified By:  Capnometry    Complicating Factors:  None    Findings Post-Intubation:  BS equal bilateral

## 2025-01-23 DIAGNOSIS — K21.9 GASTROESOPHAGEAL REFLUX DISEASE, UNSPECIFIED WHETHER ESOPHAGITIS PRESENT: ICD-10-CM

## 2025-01-24 RX ORDER — OMEPRAZOLE 40 MG/1
CAPSULE, DELAYED RELEASE ORAL
Qty: 30 CAPSULE | Refills: 3 | Status: SHIPPED | OUTPATIENT
Start: 2025-01-24

## 2025-04-11 ENCOUNTER — OFFICE VISIT (OUTPATIENT)
Dept: URGENT CARE | Facility: CLINIC | Age: 35
End: 2025-04-11
Payer: MEDICAID

## 2025-04-11 ENCOUNTER — LAB VISIT (OUTPATIENT)
Dept: LAB | Facility: HOSPITAL | Age: 35
End: 2025-04-11
Attending: INTERNAL MEDICINE
Payer: MEDICAID

## 2025-04-11 VITALS
BODY MASS INDEX: 39.34 KG/M2 | HEIGHT: 55 IN | DIASTOLIC BLOOD PRESSURE: 75 MMHG | TEMPERATURE: 98 F | SYSTOLIC BLOOD PRESSURE: 108 MMHG | OXYGEN SATURATION: 100 % | HEART RATE: 63 BPM | WEIGHT: 170 LBS | RESPIRATION RATE: 16 BRPM

## 2025-04-11 DIAGNOSIS — I51.9 MYXEDEMA HEART DISEASE: Primary | ICD-10-CM

## 2025-04-11 DIAGNOSIS — H60.92 OTITIS EXTERNA OF LEFT EAR, UNSPECIFIED CHRONICITY, UNSPECIFIED TYPE: ICD-10-CM

## 2025-04-11 DIAGNOSIS — E03.9 MYXEDEMA HEART DISEASE: Primary | ICD-10-CM

## 2025-04-11 DIAGNOSIS — H61.22 IMPACTED CERUMEN OF LEFT EAR: Primary | ICD-10-CM

## 2025-04-11 DIAGNOSIS — H66.91 RIGHT OTITIS MEDIA, UNSPECIFIED OTITIS MEDIA TYPE: ICD-10-CM

## 2025-04-11 DIAGNOSIS — H61.21 IMPACTED CERUMEN OF RIGHT EAR: ICD-10-CM

## 2025-04-11 LAB
T4 FREE SERPL-MCNC: 0.84 NG/DL (ref 0.71–1.51)
TSH SERPL-ACNC: 1.78 UIU/ML (ref 0.4–4)

## 2025-04-11 PROCEDURE — 84443 ASSAY THYROID STIM HORMONE: CPT

## 2025-04-11 PROCEDURE — 36415 COLL VENOUS BLD VENIPUNCTURE: CPT

## 2025-04-11 RX ORDER — CEFDINIR 300 MG/1
300 CAPSULE ORAL 2 TIMES DAILY
Qty: 10 CAPSULE | Refills: 0 | Status: SHIPPED | OUTPATIENT
Start: 2025-04-11 | End: 2025-04-16

## 2025-04-11 RX ORDER — CIPROFLOXACIN AND DEXAMETHASONE 3; 1 MG/ML; MG/ML
4 SUSPENSION/ DROPS AURICULAR (OTIC) 2 TIMES DAILY
Qty: 7.5 ML | Refills: 0 | Status: SHIPPED | OUTPATIENT
Start: 2025-04-11 | End: 2025-04-18

## 2025-04-11 NOTE — PROGRESS NOTES
"Subjective:      Patient ID: Mallory Abadie Bradbury is a 34 y.o. female.    Vitals:  height is 4' 5" (1.346 m) and weight is 77.1 kg (170 lb). Her oral temperature is 97.8 °F (36.6 °C). Her blood pressure is 108/75 and her pulse is 63. Her respiration is 16 and oxygen saturation is 100%.     Chief Complaint: Otalgia    In clinic for chief complaint of right ear pain.  Symptoms has been waxing and waning for the last 2 weeks that became more severe and constant today.  No complaint of muffled hearing, or ear drainage.  Denies use of Q-tips.  Also denies history of smoking or vaping.  Also has had no antecedent flights, or diving.  That has use no medications with the symptoms.  No recent URI.    Otalgia   There is pain in the right ear. This is a new problem. The current episode started in the past 7 days. There has been no fever. Pertinent negatives include no coughing, headaches, hearing loss, rhinorrhea or sore throat.       Constitution: Negative for fever.   HENT:  Positive for ear pain. Negative for hearing loss and sore throat.    Neck: neck negative.   Cardiovascular: Negative.    Eyes: Negative.    Respiratory:  Negative for cough.    Gastrointestinal: Negative.    Endocrine: negative.   Genitourinary: Negative.    Musculoskeletal: Negative.    Skin: Negative.    Neurological:  Negative for headaches.   Hematologic/Lymphatic: Negative.    Psychiatric/Behavioral: Negative.        Objective:     Physical Exam   Constitutional: She is oriented to person, place, and time. She is cooperative.  Non-toxic appearance. She does not appear ill. No distress. obesity  HENT:   Head: Normocephalic and atraumatic.   Ears:   Right Ear: Hearing and external ear normal. There is tenderness. No mastoid tenderness. No decreased hearing is noted. impacted cerumen (Partial)  Left Ear: Hearing and external ear normal. impacted cerumen  Nose: Nose normal.   Mouth/Throat: Uvula is midline, oropharynx is clear and moist and mucous " membranes are normal. Mucous membranes are moist. Oropharynx is clear.   Eyes: Conjunctivae and lids are normal. Pupils are equal, round, and reactive to light. Extraocular movement intact   Neck: Trachea normal and phonation normal. Neck supple.   Cardiovascular: Normal rate, regular rhythm, normal heart sounds and normal pulses.   Pulmonary/Chest: Effort normal and breath sounds normal. No stridor. She has no wheezes. She has no rhonchi. She has no rales.   Abdominal: Normal appearance. Soft. flat abdomen There is no abdominal tenderness.   Musculoskeletal: Normal range of motion.         General: Normal range of motion.   Lymphadenopathy:     She has no cervical adenopathy.   Neurological: no focal deficit. She is alert, oriented to person, place, and time and at baseline. She has normal motor skills, normal sensation and intact cranial nerves (2-12). Gait and coordination normal. GCS eye subscore is 4. GCS verbal subscore is 5. GCS motor subscore is 6.   Skin: Skin is warm, dry and not diaphoretic. Capillary refill takes 2 to 3 seconds.   Psychiatric: She experiences Normal attention and Normal perception. Her speech is normal and behavior is normal. Mood, judgment and thought content normal.   Nursing note and vitals reviewed.      Assessment:     1. Impacted cerumen of left ear    2. Otitis externa of left ear, unspecified chronicity, unspecified type    3. Right otitis media, unspecified otitis media type    4. Impacted cerumen of right ear        Plan:       Impacted cerumen of left ear  -     Ear wax removal    Otitis externa of left ear, unspecified chronicity, unspecified type  -     ciprofloxacin-dexAMETHasone 0.3-0.1% (CIPRODEX) 0.3-0.1 % DrpS; Place 4 drops into the left ear 2 (two) times daily. for 7 days  Dispense: 7.5 mL; Refill: 0    Right otitis media, unspecified otitis media type  -     cefdinir (OMNICEF) 300 MG capsule; Take 1 capsule (300 mg total) by mouth 2 (two) times daily. for 5 days   Dispense: 10 capsule; Refill: 0    Impacted cerumen of right ear  -     Ear Cerumen Removal          Medical Decision Making:   Initial Assessment:   In clinic for progressively worsening right ear otalgia.  Otic exam that has a partial cerumen impactions.  Ear was irrigated, and post irrigation TM fully visualize.  There is a brownish discoloration of the inferior portion of TM as well as an effusion present.  Left TM completely impacted.  Post irrigation canal erythematous, and irritated.  She also has some retained cerumen, but TM was fully visualized.  No recent URI, traveled include diving or flying, also denies history of smoking.  Differential Diagnosis:   Eustachian tube dysfunction, TM perforation,

## 2025-04-11 NOTE — PROCEDURES
Ear Cerumen Removal    Date/Time: 4/11/2025 2:30 PM    Performed by: Rodriguez Rivera FNP  Authorized by: Rodriguez Rivera FNP    Consent Done?:  Not Needed    Local anesthetic:  None  Ceruminolytics applied: Ceruminolytics applied prior to the procedure    Medication Used:  Other (colace)  Location details:  Both ears  Procedure type: irrigation    Cerumen  Removal Results:  Cerumen completely removed  Patient tolerance:  Patient tolerated the procedure well with no immediate complications

## 2025-04-17 ENCOUNTER — PATIENT MESSAGE (OUTPATIENT)
Dept: GASTROENTEROLOGY | Facility: CLINIC | Age: 35
End: 2025-04-17
Payer: MEDICAID

## 2025-04-19 ENCOUNTER — PATIENT MESSAGE (OUTPATIENT)
Dept: GASTROENTEROLOGY | Facility: CLINIC | Age: 35
End: 2025-04-19
Payer: MEDICAID

## 2025-04-19 DIAGNOSIS — R13.10 DYSPHAGIA, UNSPECIFIED TYPE: Primary | ICD-10-CM

## 2025-04-21 ENCOUNTER — TELEPHONE (OUTPATIENT)
Dept: ENDOSCOPY | Facility: HOSPITAL | Age: 35
End: 2025-04-21
Payer: MEDICAID

## 2025-04-21 VITALS — BODY MASS INDEX: 38.18 KG/M2 | HEIGHT: 55 IN | WEIGHT: 165 LBS

## 2025-04-21 DIAGNOSIS — R13.10 DYSPHAGIA, UNSPECIFIED TYPE: Primary | ICD-10-CM

## 2025-04-21 NOTE — TELEPHONE ENCOUNTER
"----- Message from Bety sent at 4/21/2025  9:00 AM CDT -----    ----- Message -----  From: Domingo Durand MD  Sent: 4/19/2025  11:00 AM CDT  To: [facility]    Procedure: EGDDiagnosis: DysphagiaProcedure Timing: Within 1-2 weeks (Urgent)#If within 4 weeks selected, please olga as high priority##If greater than 12 weeks, please select "5-12 weeks" and delay sending until 3 months prior to requested date# Location: Hospital Based ([facility],  [facility], [facility])Additional Scheduling Information: No scheduling concerns, any GI MDPrep Specifications:Standard prepIs the patient taking a GLP-1 Agonist:no but please askHave you attached a patient to this message: yes  "

## 2025-04-21 NOTE — TELEPHONE ENCOUNTER
Referral for procedure from [facility]      Spoke to Mallory patient mother  to schedule procedure(s) Upper Endoscopy (EGD)       Physician to perform procedure(s) Dr. ARMANDO Durand  Date of Procedure (s) 5/12/25  Arrival Time 10:30 AM  Time of Procedure(s) 11:30 AM   Location of Procedure(s) [address]  Type of Rx Prep sent to patient: Other  Instructions provided to patient via [facility]    Patient was informed on the following information and verbalized understanding. Screening questionnaire reviewed with patient and complete. If procedure requires anesthesia, a responsible adult needs to be present to accompany the patient home, patient cannot drive after receiving anesthesia. Appointment details are tentative, especially check-in time. Patient will receive a prep-op call 7 days prior to confirm check-in time for procedure. If applicable the patient should contact their pharmacy to verify Rx for procedure prep is ready for pick-up. Patient was advised to call the scheduling department at [phone] if pharmacy states no Rx is available. Patient was advised to call the endoscopy scheduling department if any questions or concerns arise.       Endoscopy Scheduling Department

## 2025-04-22 ENCOUNTER — LAB VISIT (OUTPATIENT)
Dept: LAB | Facility: HOSPITAL | Age: 35
End: 2025-04-22
Attending: INTERNAL MEDICINE
Payer: MEDICAID

## 2025-04-22 DIAGNOSIS — R13.10 DYSPHAGIA, UNSPECIFIED TYPE: ICD-10-CM

## 2025-04-22 LAB
25(OH)D3+25(OH)D2 SERPL-MCNC: 85 NG/ML (ref 30–96)
ABSOLUTE EOSINOPHIL (SMH): 0.05 K/UL
ABSOLUTE MONOCYTE (SMH): 0.35 K/UL (ref 0.3–1)
ABSOLUTE NEUTROPHIL COUNT (SMH): 3.8 K/UL (ref 1.8–7.7)
ALBUMIN SERPL-MCNC: 3.7 G/DL (ref 3.5–5.2)
ALP SERPL-CCNC: 116 UNIT/L (ref 55–135)
ALT SERPL-CCNC: 14 UNIT/L (ref 10–44)
ANION GAP (SMH): 8 MMOL/L (ref 8–16)
AST SERPL-CCNC: 14 UNIT/L (ref 10–40)
B-HCG FREE SERPL-ACNC: <0.6 MIU/ML
BASOPHILS # BLD AUTO: 0.09 K/UL
BASOPHILS NFR BLD AUTO: 1.4 %
BILIRUB SERPL-MCNC: 0.5 MG/DL (ref 0.1–1)
BUN SERPL-MCNC: 18 MG/DL (ref 6–20)
CALCIUM SERPL-MCNC: 8.9 MG/DL (ref 8.7–10.5)
CHLORIDE SERPL-SCNC: 104 MMOL/L (ref 95–110)
CO2 SERPL-SCNC: 25 MMOL/L (ref 23–29)
CREAT SERPL-MCNC: 1.1 MG/DL (ref 0.5–1.4)
ERYTHROCYTE [DISTWIDTH] IN BLOOD BY AUTOMATED COUNT: 14.3 % (ref 11.5–14.5)
FERRITIN SERPL-MCNC: 69.4 NG/ML (ref 20–300)
GFR SERPLBLD CREATININE-BSD FMLA CKD-EPI: >60 ML/MIN/1.73/M2
GLUCOSE SERPL-MCNC: 82 MG/DL (ref 70–110)
HCT VFR BLD AUTO: 45.9 % (ref 37–48.5)
HGB BLD-MCNC: 14.8 GM/DL (ref 12–16)
IMM GRANULOCYTES # BLD AUTO: 0.01 K/UL (ref 0–0.04)
IMM GRANULOCYTES NFR BLD AUTO: 0.2 % (ref 0–0.5)
IRON SATN MFR SERPL: 34 % (ref 20–50)
IRON SERPL-MCNC: 89 UG/DL (ref 30–160)
LIPASE SERPL-CCNC: 24 U/L (ref 4–60)
LYMPHOCYTES # BLD AUTO: 1.97 K/UL (ref 1–4.8)
MCH RBC QN AUTO: 33.2 PG (ref 27–31)
MCHC RBC AUTO-ENTMCNC: 32.2 G/DL (ref 32–36)
MCV RBC AUTO: 103 FL (ref 82–98)
NUCLEATED RBC (/100WBC) (SMH): 0 /100 WBC
PLATELET # BLD AUTO: 259 K/UL (ref 150–450)
PMV BLD AUTO: 9.6 FL (ref 9.2–12.9)
POTASSIUM SERPL-SCNC: 4.2 MMOL/L (ref 3.5–5.1)
PROT SERPL-MCNC: 6.5 GM/DL (ref 6–8.4)
RBC # BLD AUTO: 4.46 M/UL (ref 4–5.4)
RELATIVE EOSINOPHIL (SMH): 0.8 % (ref 0–8)
RELATIVE LYMPHOCYTE (SMH): 31.4 % (ref 18–48)
RELATIVE MONOCYTE (SMH): 5.6 % (ref 4–15)
RELATIVE NEUTROPHIL (SMH): 60.6 % (ref 38–73)
SODIUM SERPL-SCNC: 137 MMOL/L (ref 136–145)
TIBC SERPL-MCNC: 259 UG/DL (ref 250–450)
TRANSFERRIN SERPL-MCNC: 185 MG/DL (ref 200–375)
VIT B12 SERPL-MCNC: 265 PG/ML (ref 210–950)
WBC # BLD AUTO: 6.28 K/UL (ref 3.9–12.7)

## 2025-04-22 PROCEDURE — 80053 COMPREHEN METABOLIC PANEL: CPT

## 2025-04-22 PROCEDURE — 83690 ASSAY OF LIPASE: CPT

## 2025-04-22 PROCEDURE — 85025 COMPLETE CBC W/AUTO DIFF WBC: CPT

## 2025-04-22 PROCEDURE — 82306 VITAMIN D 25 HYDROXY: CPT

## 2025-04-22 PROCEDURE — 84466 ASSAY OF TRANSFERRIN: CPT

## 2025-04-22 PROCEDURE — 84702 CHORIONIC GONADOTROPIN TEST: CPT

## 2025-04-22 PROCEDURE — 82607 VITAMIN B-12: CPT

## 2025-04-22 PROCEDURE — 82728 ASSAY OF FERRITIN: CPT

## 2025-04-22 PROCEDURE — 36415 COLL VENOUS BLD VENIPUNCTURE: CPT

## 2025-04-24 ENCOUNTER — PATIENT MESSAGE (OUTPATIENT)
Dept: ENDOSCOPY | Facility: HOSPITAL | Age: 35
End: 2025-04-24
Payer: MEDICAID

## 2025-04-24 ENCOUNTER — RESULTS FOLLOW-UP (OUTPATIENT)
Dept: ENDOSCOPY | Facility: HOSPITAL | Age: 35
End: 2025-04-24

## 2025-04-24 DIAGNOSIS — D75.89 INCREASED MCV: Primary | ICD-10-CM

## 2025-04-24 NOTE — PROGRESS NOTES
Florinda please schedule Danae for folate level check when she comes for her EGD orders placed    Florinda please recommend Danae follow-up with her primary care doctor for evaluation of her elevated MCV but no anemia etiology unknown could be related to a medication she has taken will check a folate level to make sure it is not low.

## 2025-05-05 ENCOUNTER — TELEPHONE (OUTPATIENT)
Dept: PODIATRY | Facility: CLINIC | Age: 35
End: 2025-05-05
Payer: MEDICAID

## 2025-05-05 NOTE — TELEPHONE ENCOUNTER
----- Message from Shahida sent at 5/5/2025 12:27 PM CDT -----  Type:  Sooner Appointment RequestCaller is requesting a sooner appointment.  Caller declined first available appointment listed below.  Caller will not accept being placed on the waitlist and is requesting a message be sent to doctor.Name of Caller:  pt mom When is the first available appointment?  N/a Symptoms:  callus Would the patient rather a call back or a response via [facility]? Call Best Call Back Number:  [phone] (home) Additional Information:  please advise

## 2025-05-12 ENCOUNTER — ANESTHESIA (OUTPATIENT)
Dept: ENDOSCOPY | Facility: HOSPITAL | Age: 35
End: 2025-05-12
Payer: MEDICAID

## 2025-05-12 ENCOUNTER — HOSPITAL ENCOUNTER (OUTPATIENT)
Facility: HOSPITAL | Age: 35
Discharge: HOME | End: 2025-05-12
Attending: INTERNAL MEDICINE | Admitting: INTERNAL MEDICINE
Payer: MEDICAID

## 2025-05-12 ENCOUNTER — ANESTHESIA EVENT (OUTPATIENT)
Dept: ENDOSCOPY | Facility: HOSPITAL | Age: 35
End: 2025-05-12
Payer: MEDICAID

## 2025-05-12 VITALS
DIASTOLIC BLOOD PRESSURE: 70 MMHG | HEART RATE: 83 BPM | SYSTOLIC BLOOD PRESSURE: 113 MMHG | TEMPERATURE: 98 F | OXYGEN SATURATION: 100 % | BODY MASS INDEX: 39.34 KG/M2 | WEIGHT: 170 LBS | HEIGHT: 55 IN | RESPIRATION RATE: 20 BRPM

## 2025-05-12 DIAGNOSIS — R13.10 DYSPHAGIA: ICD-10-CM

## 2025-05-12 DIAGNOSIS — R13.10 DYSPHAGIA, UNSPECIFIED TYPE: ICD-10-CM

## 2025-05-12 DIAGNOSIS — K21.9 GASTROESOPHAGEAL REFLUX DISEASE, UNSPECIFIED WHETHER ESOPHAGITIS PRESENT: ICD-10-CM

## 2025-05-12 LAB
B-HCG UR QL: NEGATIVE
CTP QC/QA: YES

## 2025-05-12 PROCEDURE — 43239 EGD BIOPSY SINGLE/MULTIPLE: CPT | Mod: XS | Performed by: INTERNAL MEDICINE

## 2025-05-12 PROCEDURE — 43249 ESOPH EGD DILATION <30 MM: CPT | Performed by: INTERNAL MEDICINE

## 2025-05-12 PROCEDURE — 43239 EGD BIOPSY SINGLE/MULTIPLE: CPT | Mod: XS,,, | Performed by: INTERNAL MEDICINE

## 2025-05-12 PROCEDURE — 43249 ESOPH EGD DILATION <30 MM: CPT | Mod: 22,,, | Performed by: INTERNAL MEDICINE

## 2025-05-12 PROCEDURE — 63600175 PHARM REV CODE 636 W HCPCS

## 2025-05-12 PROCEDURE — C1726 CATH, BAL DIL, NON-VASCULAR: HCPCS | Performed by: INTERNAL MEDICINE

## 2025-05-12 PROCEDURE — 25000003 PHARM REV CODE 250

## 2025-05-12 PROCEDURE — 88305 TISSUE EXAM BY PATHOLOGIST: CPT | Mod: TC,91 | Performed by: INTERNAL MEDICINE

## 2025-05-12 PROCEDURE — 37000009 HC ANESTHESIA EA ADD 15 MINS: Performed by: INTERNAL MEDICINE

## 2025-05-12 PROCEDURE — 37000008 HC ANESTHESIA 1ST 15 MINUTES: Performed by: INTERNAL MEDICINE

## 2025-05-12 PROCEDURE — 27201012 HC FORCEPS, HOT/COLD, DISP: Performed by: INTERNAL MEDICINE

## 2025-05-12 PROCEDURE — 25000003 PHARM REV CODE 250: Performed by: INTERNAL MEDICINE

## 2025-05-12 RX ORDER — ONDANSETRON HYDROCHLORIDE 2 MG/ML
INJECTION, SOLUTION INTRAVENOUS
Status: DISCONTINUED | OUTPATIENT
Start: 2025-05-12 | End: 2025-05-12

## 2025-05-12 RX ORDER — LIDOCAINE HYDROCHLORIDE 20 MG/ML
INJECTION, SOLUTION EPIDURAL; INFILTRATION; INTRACAUDAL; PERINEURAL
Status: DISCONTINUED | OUTPATIENT
Start: 2025-05-12 | End: 2025-05-12

## 2025-05-12 RX ORDER — ROCURONIUM BROMIDE 10 MG/ML
INJECTION, SOLUTION INTRAVENOUS
Status: DISCONTINUED | OUTPATIENT
Start: 2025-05-12 | End: 2025-05-12

## 2025-05-12 RX ORDER — SODIUM CHLORIDE 9 MG/ML
INJECTION, SOLUTION INTRAVENOUS CONTINUOUS
Status: DISCONTINUED | OUTPATIENT
Start: 2025-05-12 | End: 2025-05-12 | Stop reason: HOSPADM

## 2025-05-12 RX ORDER — SODIUM CHLORIDE 0.9 % (FLUSH) 0.9 %
10 SYRINGE (ML) INJECTION
Status: DISCONTINUED | OUTPATIENT
Start: 2025-05-12 | End: 2025-05-12 | Stop reason: HOSPADM

## 2025-05-12 RX ORDER — GLUCAGON 1 MG
1 KIT INJECTION
Status: DISCONTINUED | OUTPATIENT
Start: 2025-05-12 | End: 2025-05-12 | Stop reason: HOSPADM

## 2025-05-12 RX ORDER — PROPOFOL 10 MG/ML
VIAL (ML) INTRAVENOUS
Status: DISCONTINUED | OUTPATIENT
Start: 2025-05-12 | End: 2025-05-12

## 2025-05-12 RX ORDER — DEXAMETHASONE SODIUM PHOSPHATE 4 MG/ML
INJECTION, SOLUTION INTRA-ARTICULAR; INTRALESIONAL; INTRAMUSCULAR; INTRAVENOUS; SOFT TISSUE
Status: DISCONTINUED | OUTPATIENT
Start: 2025-05-12 | End: 2025-05-12

## 2025-05-12 RX ORDER — OMEPRAZOLE 40 MG/1
40 CAPSULE, DELAYED RELEASE ORAL
Qty: 90 CAPSULE | Refills: 3 | Status: SHIPPED | OUTPATIENT
Start: 2025-05-12 | End: 2026-05-12

## 2025-05-12 RX ORDER — SUCCINYLCHOLINE CHLORIDE 20 MG/ML
INJECTION INTRAMUSCULAR; INTRAVENOUS
Status: DISCONTINUED | OUTPATIENT
Start: 2025-05-12 | End: 2025-05-12

## 2025-05-12 RX ADMIN — ONDANSETRON 4 MG: 2 INJECTION INTRAMUSCULAR; INTRAVENOUS at 11:05

## 2025-05-12 RX ADMIN — ROCURONIUM BROMIDE 10 MG: 10 INJECTION INTRAVENOUS at 11:05

## 2025-05-12 RX ADMIN — GLYCOPYRROLATE 0.2 MG: 0.2 INJECTION, SOLUTION INTRAMUSCULAR; INTRAVENOUS at 11:05

## 2025-05-12 RX ADMIN — LIDOCAINE HYDROCHLORIDE 100 MG: 20 INJECTION, SOLUTION EPIDURAL; INFILTRATION; INTRACAUDAL; PERINEURAL at 11:05

## 2025-05-12 RX ADMIN — SUCCINYLCHOLINE CHLORIDE 80 MG: 20 INJECTION, SOLUTION INTRAMUSCULAR; INTRAVENOUS at 11:05

## 2025-05-12 RX ADMIN — SODIUM CHLORIDE: 0.9 INJECTION, SOLUTION INTRAVENOUS at 11:05

## 2025-05-12 RX ADMIN — PROPOFOL 100 MG: 10 INJECTION, EMULSION INTRAVENOUS at 11:05

## 2025-05-12 RX ADMIN — DEXAMETHASONE SODIUM PHOSPHATE 4 MG: 4 INJECTION, SOLUTION INTRAMUSCULAR; INTRAVENOUS at 11:05

## 2025-05-12 NOTE — PROVATION PATIENT INSTRUCTIONS
Discharge Summary/Instructions after an Endoscopic Procedure  Patient Name: Danae Ruff  Patient MRN: [id number]  Patient YOB: 1990  Monday, May 12, 2025  Domingo Durand MD  Dear patient,  As a result of recent federal legislation (The Federal Cures Act), you may   receive lab or pathology results from your procedure in your [facility]   account before your physician is able to contact you. Your physician or   their representative will relay the results to you with their   recommendations at their soonest availability.  Thank you,  RESTRICTIONS:  During your procedure today, you received medications for sedation.  These   medications may affect your judgment, balance and coordination.  Therefore,   for 24 hours, you have the following restrictions:   - DO NOT drive a car, operate machinery, make legal/financial decisions,   sign important papers or drink alcohol.    ACTIVITY:  Today: no heavy lifting, straining or running due to procedural   sedation/anesthesia.  The following day: return to full activity including work.  DIET:  Eat and drink normally unless instructed otherwise.     TREATMENT FOR COMMON SIDE EFFECTS:  - Mild abdominal pain, nausea, belching, bloating or excessive gas:  rest,   eat lightly and use a heating pad.  - Sore Throat: treat with throat lozenges and/or gargle with warm salt   water.  - Because air was used during the procedure, expelling large amounts of air   from your rectum or belching is normal.  - If a bowel prep was taken, you may not have a bowel movement for 1-3 days.    This is normal.  SYMPTOMS TO WATCH FOR AND REPORT TO YOUR PHYSICIAN:  1. Abdominal pain or bloating, other than gas cramps.  2. Chest pain.  3. Back pain.  4. Signs of infection such as: chills or fever occurring within 24 hours   after the procedure.  5. Rectal bleeding, which would show as bright red, maroon, or black stools.   (A tablespoon of blood from the rectum is not serious, especially if    hemorrhoids are present.)  6. Vomiting.  7. Weakness or dizziness.  GO DIRECTLY TO THE NEAREST EMERGENCY ROOM IF YOU HAVE ANY OF THE FOLLOWING:      Difficulty breathing              Chills and/or fever over 101 F   Persistent vomiting and/or vomiting blood   Severe abdominal pain   Severe chest pain   Black, tarry stools   Bleeding- more than one tablespoon   Any other symptom or condition that you feel may need urgent attention  Your doctor recommends these additional instructions:  If any biopsies were taken, your doctors clinic will contact you in 1 to 2   weeks with any results.  - Discharge patient to home.   - Follow an antireflux regimen indefinitely.   - Use Omeprazole 40 mg once daily (or any other full strength proton pump   inhibitor) - best taken 45-60 minutes before your first protein containing   meal.   - Repeat upper endoscopy in 1 year for surveillance based on pathology   results.   - Perform routine esophageal manometry at the next available appointment.   - Return to referring physician at the next available appointment.   - Await pathology results.   - Telephone endoscopist for pathology results in 3 weeks.   - The findings and recommendations were discussed with the patient.   - The findings and recommendations were discussed with the patient's   family.  For questions, problems or results please call your physician - Domingo Durand MD at Work:  [phone].  [facility], EMERGENCY ROOM PHONE NUMBER: [phone]  IF A COMPLICATION OR EMERGENCY SITUATION ARISES AND YOU ARE UNABLE TO REACH   YOUR PHYSICIAN - GO DIRECTLY TO THE EMERGENCY ROOM.  Domingo Durand MD  5/12/2025 11:53:56 AM  This report has been verified and signed electronically.  Dear patient,  As a result of recent federal legislation (The Federal Cures Act), you may   receive lab or pathology results from your procedure in your [facility]   account before your physician is able to contact you. Your physician or    their representative will relay the results to you with their   recommendations at their soonest availability.  Thank you,  PROVATION

## 2025-05-12 NOTE — ANESTHESIA PREPROCEDURE EVALUATION
05/12/2025  Mallory Abadie Bradbury is a 34 y.o., female.      Pre-op Assessment          Review of Systems  Pulmonary:  Pneumonia                      Hepatic/GI:    Hiatal Hernia, GERD                Neurological:    Neuromuscular Disease,                                   Endocrine:        Morbid Obesity / BMI > 40  Psych:  Psychiatric History                     Anesthesia Plan  Type of Anesthesia, risks & benefits discussed:    Anesthesia Type: Gen Natural Airway  Intra-op Monitoring Plan: Standard ASA Monitors  Induction:  IV  Informed Consent: Informed consent signed with the Patient and all parties understand the risks and agree with anesthesia plan.  All questions answered.   ASA Score: 3  Day of Surgery Review of History & Physical: H&P Update referred to the surgeon/provider.    Ready For Surgery From Anesthesia Perspective.     .

## 2025-05-12 NOTE — H&P
[facility] - Endoscopy  History & Physical    Subjective:      Chief Complaint/Reason for Admission:     EGD for dysphagia.        Mallory Abadie Bradbury is a 34 y.o. female.    Past Medical History:   Diagnosis Date    Difficult intubation     per dad     Down's syndrome     Esophageal stenosis     GERD (gastroesophageal reflux disease)     Hiatal hernia     Thyroid disease      Past Surgical History:   Procedure Laterality Date    APPENDECTOMY      COLONOSCOPY N/A 8/28/2020    Procedure: COLONOSCOPY;  Surgeon: Domingo Durand MD;  Location: [facility] ([facility]);  Service: Endoscopy;  Laterality: N/A;  Please schedule in the 2nd floor for airway protection body habitus down syndrome     COVID test at [facility] on 8/25/20-GT    ESOPHAGOGASTRODUODENOSCOPY N/A 1/27/2020    Procedure: EGD (ESOPHAGOGASTRODUODENOSCOPY);  Surgeon: Violette Garrett MD;  Location: [facility];  Service: Endoscopy;  Laterality: N/A;    ESOPHAGOGASTRODUODENOSCOPY N/A 7/14/2020    Procedure: EGD (ESOPHAGOGASTRODUODENOSCOPY);  Surgeon: Domingo Durand MD;  Location: [facility] ([address] [place] [address]);  Service: Endoscopy;  Laterality: N/A;  Urgent EGD 2nd floor for airway protection due to difficult intubation airway anatomy for esophageal dilation.  Please schedule her next week 2nd floor for dysphagia.     COVID test at [facility] on 7/11/20-GT    ESOPHAGOGASTRODUODENOSCOPY N/A 8/28/2020    Procedure: EGD (ESOPHAGOGASTRODUODENOSCOPY);  Surgeon: Domingo Durand MD;  Location: [facility] ([address] [place] [address]);  Service: Endoscopy;  Laterality: N/A;  per Dr Kiser-add EGD to colonoscopy order  8/26-pt's mother confirmed appt-MS    ESOPHAGOGASTRODUODENOSCOPY N/A 2/9/2021    Procedure: EGD (ESOPHAGOGASTRODUODENOSCOPY);  Surgeon: Domingo Durand MD;  [facility]  Service: Endoscopy;  Laterality: N/A;  Please schedule patient 2nd floor 1st available provider for esophageal dysphagia and history of it difficult intubation in airway per her  father.  Patient with Down syndrome is here with her father he states that she has been having trouble swallowing    ESOPHAGOGASTRODUODENOSCOPY N/A 6/29/2021    Procedure: EGD (ESOPHAGOGASTRODUODENOSCOPY);  Surgeon: Ryan Floyd MD;  Location: [facility] (2ND [facility]);  Service: Endoscopy;  Laterality: N/A;  dysphagia needs dialation     Please schedule patient 2nd floor 1st available provider for esophageal dysphagia and history of it difficult intubation in airway per her father.  Patient with Down syndrome. mother requesting ASAP  fully vaccinated-GT    ESOPHAGOGASTRODUODENOSCOPY N/A 4/13/2022    Procedure: EGD (ESOPHAGOGASTRODUODENOSCOPY);  Surgeon: Domingo Woods MD;  Location: [facility] (2ND [facility]);  Service: Endoscopy;  Laterality: N/A;  Dysphagia please evaluate for esophageal dilation     Please schedule patient 2nd floor 1st available provider for esophageal dysphagia and history of it difficult intubation in airway per her father.  Patient with Down syndrome. mother requesting ASAP    ESOPHAGOGASTRODUODENOSCOPY N/A 3/9/2023    Procedure: ESOPHAGOGASTRODUODENOSCOPY (EGD);  Surgeon: Ryan Floyd MD;  Location: [facility] (2ND [facility]);  Service: Endoscopy;  Laterality: N/A;  hx of difficult intubation. down's syndrome  Endoscopy schedulers please schedule Danae for an urgent 1st MD available for EGD 2nd floor for airway protection for dysphagia likely will need esophageal dilation.  Thank you  instr portal-GT    ESOPHAGOGASTRODUODENOSCOPY N/A 3/18/2024    Procedure: EGD (ESOPHAGOGASTRODUODENOSCOPY);  Surgeon: Augustin Carcamo MD;  Location: [facility] (2ND [facility]);  Service: Endoscopy;  Laterality: N/A;  woods-downs syndrome-gastroporesis diet-ozempic-tb-last dose 3/8/24    ESOPHAGOGASTRODUODENOSCOPY N/A 10/11/2024    Procedure: EGD (ESOPHAGOGASTRODUODENOSCOPY);  Surgeon: Domingo Woods MD;  Location: [facility] (2ND [facility]);  Service: Endoscopy;  Laterality: N/A;  ozempic/ gastroparesisi prep/ prep inst sent  to pt via portal/2nd floor airway protection/woods pt  10/4-pre call complete with mother-pt has downs syndrome-confirmed last ozempic 9/27/24-tb    esposgus      TONGUE SURGERY      TONSILLECTOMY       Social History[1]    PTA Medications   Medication Sig    escitalopram oxalate (LEXAPRO) 20 MG tablet Take 1 tablet by mouth once daily.    levothyroxine (SYNTHROID) 75 MCG tablet Take 1 tablet (75 mcg total) by mouth every Tuesday, Thursday, Saturday, Sunday.    medroxyPROGESTERone (DEPO-PROVERA) 150 mg/mL injection     omeprazole (PRILOSEC) 40 MG capsule TAKE 1 CAPSULE BY MOUTH 45 MINUTES BEFORE BREAKFAST    semaglutide (OZEMPIC) 0.25 mg or 0.5 mg (2 mg/3 mL) pen injector Inject 0.5 mg into the skin every 7 days.    levothyroxine (SYNTHROID) 88 MCG tablet Take 1 tablet (88 mcg total) by mouth every Mon, Wed, Fri. (Patient not taking: Reported on 6/19/2024)    midodrine (PROAMATINE) 5 MG Tab Take 1 tablet (5 mg total) by mouth 2 (two) times daily with meals.     Review of patient's allergies indicates:   Allergen Reactions    Amoxicillin Hives    Augmentin [amoxicillin-pot clavulanate] Rash    Bactrim [sulfamethoxazole-trimethoprim] Rash    Duricef [cefadroxil] Rash    Keflex [cephalexin] Rash    Rifampin Rash        Review of Systems   Constitutional:  Negative for fever.       Objective:      Vital Signs (Most Recent)       Vital Signs Range (Last 24H):       Physical Exam  Neurological:      Mental Status: She is alert.             Assessment:        EGD for dysphagia.    Plan:      EGD for dysphagia.         [1]   Social History  Tobacco Use    Smoking status: Never    Smokeless tobacco: Never   Substance Use Topics    Alcohol use: No    Drug use: Never

## 2025-05-12 NOTE — TRANSFER OF CARE
"Anesthesia Transfer of Care Note    Patient: Mallory Abadie Bradbury    Procedure(s) Performed: Procedure(s) (LRB):  EGD (ESOPHAGOGASTRODUODENOSCOPY) (N/A)    Patient location: [facility]    Anesthesia Type: general    Transport from OR: Transported from OR on 6-10 L/min O2 by face mask with adequate spontaneous ventilation    Post pain: adequate analgesia    Post assessment: no apparent anesthetic complications and tolerated procedure well    Post vital signs: stable    Level of consciousness: awake and alert    Nausea/Vomiting: no nausea/vomiting    Complications: none    Transfer of care protocol was followed      Last vitals: Visit Vitals  /65 (BP Location: Left arm, Patient Position: Sitting)   Pulse 72   Temp 36.9 °C (98.4 °F) (Temporal)   Resp 17   Ht 4' 5" (1.346 m)   Wt 77.1 kg (170 lb)   SpO2 100%   Breastfeeding No   BMI 42.55 kg/m²     "

## 2025-05-12 NOTE — ANESTHESIA PROCEDURE NOTES
Intubation    Date/Time: 5/12/2025 11:11 AM    Performed by: Caridad Johnson CRNA  Authorized by: Rebel Greer MD    Intubation:     Induction:  Intravenous    Intubated:  Postinduction    Mask Ventilation:  Easy mask    Attempts:  1    Attempted By:  CRNA    Method of Intubation:  Video laryngoscopy    Blade:  Jiménez 3    Laryngeal View Grade: Grade I - full view of cords      Difficult Airway Encountered?: No      Airway Device:  Oral endotracheal tube    Airway Device Size:  7.0    Style/Cuff Inflation:  Cuffed (inflated to minimal occlusive pressure)    Tube secured:  21    Secured at:  The lips    Placement Verified By:  Capnometry    Complicating Factors:  None    Findings Post-Intubation:  BS equal bilateral and atraumatic/condition of teeth unchanged

## 2025-05-13 ENCOUNTER — TELEPHONE (OUTPATIENT)
Dept: ENDOSCOPY | Facility: HOSPITAL | Age: 35
End: 2025-05-13
Payer: MEDICAID

## 2025-05-13 VITALS — BODY MASS INDEX: 39.34 KG/M2 | WEIGHT: 170 LBS | HEIGHT: 55 IN

## 2025-05-13 DIAGNOSIS — R13.10 DYSPHAGIA, UNSPECIFIED TYPE: Primary | ICD-10-CM

## 2025-05-13 NOTE — ANESTHESIA POSTPROCEDURE EVALUATION
Anesthesia Post Evaluation    Patient: Mallory Abadie Bradbury    Procedure(s) Performed: Procedure(s) (LRB):  EGD (ESOPHAGOGASTRODUODENOSCOPY) (N/A)    Final Anesthesia Type: general      Patient location during evaluation: PACU  Patient participation: Yes- Able to Participate  Level of consciousness: awake and alert  Post-procedure vital signs: reviewed and stable  Pain management: adequate  Airway patency: patent    PONV status at discharge: No PONV  Anesthetic complications: no      Cardiovascular status: blood pressure returned to baseline  Respiratory status: unassisted  Hydration status: euvolemic  Follow-up not needed.              Vitals Value Taken Time   /70 05/12/25 12:45   Temp 36.4 °C (97.5 °F) 05/12/25 12:00   Pulse 83 05/12/25 12:45   Resp 20 05/12/25 12:45   SpO2 100 % 05/12/25 12:45         No case tracking events are documented in the log.      Pain/Tere Score: Tere Score: 10 (5/12/2025 12:45 PM)

## 2025-05-13 NOTE — TELEPHONE ENCOUNTER
Patient is scheduled for a Esophageal Manometry on 6/24/25 with Dr. JUAN Meraz  Referral for procedure from [facility]

## 2025-05-13 NOTE — TELEPHONE ENCOUNTER
"----- Message from Simeon Wolff sent at 5/12/2025 11:46 AM CDT -----  Regarding: esophageal manometry  Procedure: Esophageal manometryDiagnosis: DysphagiaProcedure Timing: Within 12 weeks#If within 4 weeks selected, please olga as high priority##If greater than 12 weeks, please select "5-12 weeks" and delay sending until 3 months prior to requested date# Location: Any SiteAdditional Scheduling Information: For Dr. Meraz to read (per Dr. Durand's request). Pt has Down syndrome and will need parents present to help her tolerate the manometry.Prep Specifications:N/AIs the patient taking a GLP-1 Agonist:noHave you attached a patient to this message: yes  "

## 2025-05-14 LAB
DHEA SERPL-MCNC: NORMAL
ESTROGEN SERPL-MCNC: NORMAL PG/ML
INSULIN SERPL-ACNC: NORMAL U[IU]/ML
LAB AP CLINICAL INFORMATION: NORMAL
LAB AP GROSS DESCRIPTION: NORMAL
LAB AP PERFORMING LOCATION(S): NORMAL
LAB AP REPORT FOOTNOTES: NORMAL
T3RU NFR SERPL: NORMAL %

## 2025-05-16 ENCOUNTER — OFFICE VISIT (OUTPATIENT)
Dept: PODIATRY | Facility: CLINIC | Age: 35
End: 2025-05-16
Payer: MEDICAID

## 2025-05-16 VITALS — HEIGHT: 55 IN | BODY MASS INDEX: 39.29 KG/M2 | RESPIRATION RATE: 18 BRPM | WEIGHT: 169.75 LBS

## 2025-05-16 DIAGNOSIS — M79.672 LEFT FOOT PAIN: ICD-10-CM

## 2025-05-16 DIAGNOSIS — L85.1 ACQUIRED KERATODERMA: Primary | ICD-10-CM

## 2025-05-16 PROCEDURE — 99999 PR PBB SHADOW E&M-EST. PATIENT-LVL III: CPT | Mod: PBBFAC,,, | Performed by: PODIATRIST

## 2025-05-16 PROCEDURE — 99213 OFFICE O/P EST LOW 20 MIN: CPT | Mod: PBBFAC,PN | Performed by: PODIATRIST

## 2025-05-16 NOTE — PROGRESS NOTES
"  [address] [facility] [address]  [place] [address]  Phone: [phone]   [phone]    Patient's PCP:Lizzie Ernst NP  Referring Provider: No ref. provider found    Subjective:      Chief Complaint:: Callouses (Bilateral worse on left)    HPI Mallory Abadie Bradbury is a 34 y.o. female who presents today for follow-up on complaint of bilateral callus or keratoderm. The current episode started 6 weeks ago.  The symptoms include aching, sharp pain. Probable cause of complaint unknown.  The symptoms are aggravated by pressure, walking. The problem has stayed the same. Treatment to date have included pedicures, soaking which provided some relief.     Vitals:    05/16/25 1009   Resp: 18   Weight: 77 kg (169 lb 12.1 oz)   Height: 4' 5" (1.346 m)   PainSc:   7      Shoe Size:     Past Surgical History:   Procedure Laterality Date    APPENDECTOMY      COLONOSCOPY N/A 8/28/2020    Procedure: COLONOSCOPY;  Surgeon: Domingo Durand MD;  Location: [facility] ([address] [place] [address]);  Service: Endoscopy;  Laterality: N/A;  Please schedule in the 2nd floor for airway protection body habitus down syndrome     COVID test at [place] on 8/25/20-GT    ESOPHAGOGASTRODUODENOSCOPY N/A 1/27/2020    Procedure: EGD (ESOPHAGOGASTRODUODENOSCOPY);  Surgeon: Violette Garrett MD;  Location: [facility];  Service: Endoscopy;  Laterality: N/A;    ESOPHAGOGASTRODUODENOSCOPY N/A 7/14/2020    Procedure: EGD (ESOPHAGOGASTRODUODENOSCOPY);  Surgeon: Domingo Durand MD;  Location: [address]);  Service: Endoscopy;  Laterality: N/A;  Urgent EGD 2nd floor for airway protection due to difficult intubation airway anatomy for esophageal dilation.  Please schedule her next week 2nd floor for dysphagia.     COVID test at [place] on 7/11/20-GT    ESOPHAGOGASTRODUODENOSCOPY N/A 8/28/2020    Procedure: EGD (ESOPHAGOGASTRODUODENOSCOPY);  Surgeon: Domingo Durand MD;  Location: [facility] (2ND FLR);  Service: Endoscopy;  Laterality: N/A;  per Dr" Rashel-add EGD to colonoscopy order  8/26-pt's mother confirmed appt-MS    ESOPHAGOGASTRODUODENOSCOPY N/A 2/9/2021    Procedure: EGD (ESOPHAGOGASTRODUODENOSCOPY);  Surgeon: Domingo Durand MD;  Location: [facility] (2ND FLR);  Service: Endoscopy;  Laterality: N/A;  Please schedule patient 2nd floor 1st available provider for esophageal dysphagia and history of it difficult intubation in airway per her father.  Patient with Down syndrome is here with her father he states that she has been having trouble swallowing    ESOPHAGOGASTRODUODENOSCOPY N/A 6/29/2021    Procedure: EGD (ESOPHAGOGASTRODUODENOSCOPY);  Surgeon: Ryan Floyd MD;  Location: [facility] (2ND FLR);  Service: Endoscopy;  Laterality: N/A;  dysphagia needs dialation     Please schedule patient 2nd floor 1st available provider for esophageal dysphagia and history of it difficult intubation in airway per her father.  Patient with Down syndrome. mother requesting ASAP  fully vaccinated-GT    ESOPHAGOGASTRODUODENOSCOPY N/A 4/13/2022    Procedure: EGD (ESOPHAGOGASTRODUODENOSCOPY);  Surgeon: Domingo Durand MD;  Location: [facility] (2ND FLR);  Service: Endoscopy;  Laterality: N/A;  Dysphagia please evaluate for esophageal dilation     Please schedule patient 2nd floor 1st available provider for esophageal dysphagia and history of it difficult intubation in airway per her father.  Patient with Down syndrome. mother requesting ASAP    ESOPHAGOGASTRODUODENOSCOPY N/A 3/9/2023    Procedure: ESOPHAGOGASTRODUODENOSCOPY (EGD);  Surgeon: Ryan Floyd MD;  Location: [facility] (2ND FLR);  Service: Endoscopy;  Laterality: N/A;  hx of difficult intubation. down's syndrome  Endoscopy schedulers please schedule Danae for an urgent 1st MD available for EGD 2nd floor for airway protection for dysphagia likely will need esophageal dilation.  Thank you  instr portal-GT    ESOPHAGOGASTRODUODENOSCOPY N/A 3/18/2024    Procedure: EGD (ESOPHAGOGASTRODUODENOSCOPY);   Surgeon: Augustin Carcamo MD;  Location: [facility] (2ND FLR);  Service: Endoscopy;  Laterality: N/A;  durand-downs syndrome-gastroporesis diet-ozempic-tb-last dose 3/8/24    ESOPHAGOGASTRODUODENOSCOPY N/A 10/11/2024    Procedure: EGD (ESOPHAGOGASTRODUODENOSCOPY);  Surgeon: Domingo Durand MD;  [facility]  Service: Endoscopy;  Laterality: N/A;  ozempic/ gastroparesisi prep/ prep inst sent to pt via portal/2nd floor airway protection/peggy pt  10/4-pre call complete with mother-pt has downs syndrome-confirmed last ozempic 9/27/24-tb    ESOPHAGOGASTRODUODENOSCOPY N/A 5/12/2025    Procedure: EGD (ESOPHAGOGASTRODUODENOSCOPY);  Surgeon: Domingo Durand MD;  [facility]  Service: Endoscopy;  Laterality: N/A;  jm/2nd floor air way protection/diabetic/has not had ozempic in 2 weeks/portal/peggy  5/2 precall complete. still holding ozempic. kw    esposgus      TONGUE SURGERY      TONSILLECTOMY       Past Medical History:   Diagnosis Date    Difficult intubation     per dad     Down's syndrome     Esophageal stenosis     GERD (gastroesophageal reflux disease)     Hiatal hernia     Thyroid disease      Family History   Problem Relation Name Age of Onset    Colon cancer Paternal Grandfather  60    Colon cancer Maternal Cousin      Esophageal cancer Neg Hx      Celiac disease Neg Hx      Cirrhosis Neg Hx      Colon polyps Neg Hx          Social History:   Marital Status: Single  Alcohol History:  reports no history of alcohol use.  Tobacco History:  reports that she has never smoked. She has never used smokeless tobacco.  Drug History:  reports no history of drug use.    Review of patient's allergies indicates:   Allergen Reactions    Amoxicillin Hives    Augmentin [amoxicillin-pot clavulanate] Rash    Bactrim [sulfamethoxazole-trimethoprim] Rash    Duricef [cefadroxil] Rash    Keflex [cephalexin] Rash    Rifampin Rash       Current Medications[1]    Review of Systems    Constitutional:  Negative for chills, fatigue, fever and unexpected weight change.   HENT:  Negative for hearing loss and trouble swallowing.    Eyes:  Negative for photophobia and visual disturbance.   Respiratory:  Negative for cough, shortness of breath and wheezing.    Cardiovascular:  Negative for chest pain, palpitations and leg swelling.   Gastrointestinal:  Negative for abdominal pain and nausea.   Genitourinary:  Negative for dysuria and frequency.   Musculoskeletal:  Negative for arthralgias, back pain, gait problem, joint swelling, myalgias and neck pain.   Skin:  Negative for rash and wound.   Neurological:  Negative for tremors, seizures, weakness, numbness and headaches.   Hematological:  Does not bruise/bleed easily.         Objective:        Physical Exam:   Foot Exam    General  General Appearance: appears stated age and healthy   Orientation: alert and oriented to person, place, and time   Affect: appropriate   Gait: unimpaired       Left Foot/Ankle      Inspection and Palpation  Ecchymosis: none  Tenderness: great toe metatarsophalangeal joint   Swelling: none   Arch: normal  Hammertoes: absent  Claw toes: absent  Hallux valgus: no  Hallux limitus: no  Skin Exam: callus; no drainage, no ulcer and no erythema   Neurovascular  Dorsalis pedis: 2+  Posterior tibial: 2+  Capillary refill: 2+  Varicose veins: not present  Saphenous nerve sensation: normal  Tibial nerve sensation: normal  Superficial peroneal nerve sensation: normal  Deep peroneal nerve sensation: normal  Sural nerve sensation: normal    Muscle Strength  Ankle dorsiflexion: 5  Ankle plantar flexion: 5  Ankle inversion: 5  Ankle eversion: 5  Great toe extension: 5  Great toe flexion: 5    Range of Motion    Normal left ankle ROM    Tests  Anterior drawer: negative   Talar tilt: negative   PT Tinel's sign: negative  Paresthesia: negative      Physical Exam  Cardiovascular:      Pulses:           Dorsalis pedis pulses are 2+ on the left  side.        Posterior tibial pulses are 2+ on the left side.   Musculoskeletal:      Left foot: No bunion.   Feet:      Left foot:      Skin integrity: Callus present. No ulcer or erythema.               Left Ankle/Foot Exam     Tenderness   The patient is tender to palpation of the great toe metatarsophalangeal joint.    Range of Motion   The patient has normal left ankle ROM.       Muscle Strength   Left Lower Extremity   Ankle Dorsiflexion:  5   Plantar flexion:  5/5     Vascular Exam       Left Pulses  Dorsalis Pedis:      2+  Posterior Tibial:      2+           Imaging: none            Assessment:       1. Acquired keratoderma    2. Left foot pain      Plan:   Acquired keratoderma    Left foot pain      Follow up if symptoms worsen or fail to improve.    Procedures        I trimmed the left foot callus as courtesy. Pt tolerated well. I recommend urea cream 40% daily for continued management.     Counseling:     I provided patient education verbally regarding:   Patient diagnosis, treatment options, as well as alternatives, risks, and benefits.     This note was created using Dragon voice recognition software that occasionally misinterpreted phrases or words.                      [1]   Current Outpatient Medications   Medication Sig Dispense Refill    escitalopram oxalate (LEXAPRO) 20 MG tablet Take 1 tablet by mouth once daily.      levothyroxine (SYNTHROID) 75 MCG tablet Take 1 tablet (75 mcg total) by mouth every Tuesday, Thursday, Saturday, Sunday. 16 tablet 11    levothyroxine (SYNTHROID) 88 MCG tablet Take 1 tablet (88 mcg total) by mouth every Mon, Wed, Fri. (Patient not taking: Reported on 6/19/2024) 12 tablet 11    medroxyPROGESTERone (DEPO-PROVERA) 150 mg/mL injection   2    midodrine (PROAMATINE) 5 MG Tab Take 1 tablet (5 mg total) by mouth 2 (two) times daily with meals. 60 tablet 11    omeprazole (PRILOSEC) 40 MG capsule Take 1 capsule (40 mg total) by mouth before breakfast. Best taken 45-60  minutes before your first protein meal of the day - Breakfast. 90 capsule 3    semaglutide (OZEMPIC) 0.25 mg or 0.5 mg (2 mg/3 mL) pen injector Inject 0.5 mg into the skin every 7 days.       No current facility-administered medications for this visit.

## 2025-06-03 ENCOUNTER — TELEPHONE (OUTPATIENT)
Dept: GASTROENTEROLOGY | Facility: CLINIC | Age: 35
End: 2025-06-03
Payer: MEDICAID

## 2025-06-08 ENCOUNTER — PATIENT MESSAGE (OUTPATIENT)
Dept: GASTROENTEROLOGY | Facility: CLINIC | Age: 35
End: 2025-06-08
Payer: MEDICAID

## 2025-06-08 ENCOUNTER — RESULTS FOLLOW-UP (OUTPATIENT)
Dept: GASTROENTEROLOGY | Facility: CLINIC | Age: 35
End: 2025-06-08

## 2025-06-08 NOTE — PROGRESS NOTES
Mallory and Vaughn Fink EGD pathology was benign.  If she develops shortness or breath or tiredness when she exerts herself you should discuss with her primary care doctor about referring her to cardiology for cardiac echo evaluation.    Final Diagnosis   1. Small intestine, duodenum, biopsy:  - Benign duodenal mucosa with dilated villous lymphatics, see comment  - Intact villous architecture  - No increase in intraepithelial lymphocytes     2. Stomach, biopsy:  - Benign gastric mucosa with mild chronic inactive gastritis  - Negative for Helicobacter pylori-type microorganisms by immunohistochemistry  - Negative for intestinal metaplasia     3. Esophagus, distal, biopsy:  - Squamocolumnar mucosa with mild reactive changes  - Negative for intestinal metaplasia  - Negative for dysplasia  - No increase in intraepithelial eosinophils     4. Esophagus, distal, biopsy:  - Benign squamous epithelium  - No increase in intraepithelial eosinophils  - Negative for intestinal metaplasia      5. Esophagus, middle and proximal, biopsy:  - Benign squamous mucosa  - No increase in intraepithelial eosinophils   Electronically signed by Renan Maza DO on 5/14/2025 at 0837   Comments    The finding of dilated villous lymphatics in the duodenum may be a sequela of primary or secondary lymphangiectasia, medication/drug effects, infection, inflammatory conditions, or others. Clinical correlation is recommended. Part 1 of this case was reviewed with Dr. LANCE Villarreal [facility].

## 2025-06-23 NOTE — ED NOTES
Pt vomiting once again, emesis appears brown in color. HOB raised and pt given emesis bag. Mother at bedside to assist   6

## 2025-06-24 ENCOUNTER — HOSPITAL ENCOUNTER (OUTPATIENT)
Facility: HOSPITAL | Age: 35
Discharge: HOME | End: 2025-06-24
Attending: INTERNAL MEDICINE | Admitting: INTERNAL MEDICINE
Payer: MEDICAID

## 2025-06-24 VITALS
HEART RATE: 58 BPM | WEIGHT: 169.75 LBS | DIASTOLIC BLOOD PRESSURE: 50 MMHG | BODY MASS INDEX: 39.29 KG/M2 | HEIGHT: 55 IN | TEMPERATURE: 98 F | RESPIRATION RATE: 16 BRPM | OXYGEN SATURATION: 98 % | SYSTOLIC BLOOD PRESSURE: 88 MMHG

## 2025-06-24 DIAGNOSIS — R13.10 DYSPHAGIA: ICD-10-CM

## 2025-06-24 PROCEDURE — 91037 ESOPH IMPED FUNCTION TEST: CPT | Mod: TC | Performed by: INTERNAL MEDICINE

## 2025-06-24 PROCEDURE — 91010 ESOPHAGUS MOTILITY STUDY: CPT | Mod: TC | Performed by: INTERNAL MEDICINE

## 2025-06-24 PROCEDURE — 25000003 PHARM REV CODE 250: Performed by: INTERNAL MEDICINE

## 2025-06-24 PROCEDURE — 91010 ESOPHAGUS MOTILITY STUDY: CPT | Mod: 26,,, | Performed by: INTERNAL MEDICINE

## 2025-06-24 PROCEDURE — 91037 ESOPH IMPED FUNCTION TEST: CPT | Mod: 26,,, | Performed by: INTERNAL MEDICINE

## 2025-06-24 RX ORDER — LIDOCAINE HYDROCHLORIDE 20 MG/ML
JELLY TOPICAL ONCE
Status: COMPLETED | OUTPATIENT
Start: 2025-06-24 | End: 2025-06-24

## 2025-06-24 RX ADMIN — LIDOCAINE HYDROCHLORIDE 10 ML: 20 JELLY TOPICAL at 09:06

## 2025-06-24 NOTE — NURSING
Patient tolerated esophageal manometry fairly well. Motility catheter passed easily through right naris. Unable to properly perform multiple rapid swallows. No provocative maneuvers performed sitting up due to patient to tolerance and inability to clear fluids.

## 2025-06-25 NOTE — PROVATION PATIENT INSTRUCTIONS
Discharge Summary/Instructions after an Endoscopic Procedure  Patient Name: Danae Ruff  Patient MRN: [id number]  Patient YOB: 1990 Tuesday, June 24, 2025  Tiffanie Meraz MD  Dear patient,  As a result of recent federal legislation (The Federal Cures Act), you may   receive lab or pathology results from your procedure in your [facility]   account before your physician is able to contact you. Your physician or   their representative will relay the results to you with their   recommendations at their soonest availability.  Thank you,  RESTRICTIONS:  During your procedure today, you received medications for sedation.  These   medications may affect your judgment, balance and coordination.  Therefore,   for 24 hours, you have the following restrictions:   - DO NOT drive a car, operate machinery, make legal/financial decisions,   sign important papers or drink alcohol.    ACTIVITY:  Today: no heavy lifting, straining or running due to procedural   sedation/anesthesia.  The following day: return to full activity including work.  DIET:  Eat and drink normally unless instructed otherwise.     TREATMENT FOR COMMON SIDE EFFECTS:  - Mild abdominal pain, nausea, belching, bloating or excessive gas:  rest,   eat lightly and use a heating pad.  - Sore Throat: treat with throat lozenges and/or gargle with warm salt   water.  - Because air was used during the procedure, expelling large amounts of air   from your rectum or belching is normal.  - If a bowel prep was taken, you may not have a bowel movement for 1-3 days.    This is normal.  SYMPTOMS TO WATCH FOR AND REPORT TO YOUR PHYSICIAN:  1. Abdominal pain or bloating, other than gas cramps.  2. Chest pain.  3. Back pain.  4. Signs of infection such as: chills or fever occurring within 24 hours   after the procedure.  5. Rectal bleeding, which would show as bright red, maroon, or black stools.   (A tablespoon of blood from the rectum is not serious, especially  if   hemorrhoids are present.)  6. Vomiting.  7. Weakness or dizziness.  GO DIRECTLY TO THE NEAREST EMERGENCY ROOM IF YOU HAVE ANY OF THE FOLLOWING:      Difficulty breathing              Chills and/or fever over 101 F   Persistent vomiting and/or vomiting blood   Severe abdominal pain   Severe chest pain   Black, tarry stools   Bleeding- more than one tablespoon   Any other symptom or condition that you feel may need urgent attention  Your doctor recommends these additional instructions:  If any biopsies were taken, your doctors clinic will contact you in 1 to 2   weeks with any results.  Follow up with your referring provider.  For questions, problems or results please call your physician - Tiffanie Meraz MD at Work:  [phone].  [facility], EMERGENCY ROOM PHONE NUMBER: [phone]  IF A COMPLICATION OR EMERGENCY SITUATION ARISES AND YOU ARE UNABLE TO REACH   YOUR PHYSICIAN - GO DIRECTLY TO THE EMERGENCY ROOM.  Tiffanie Meraz MD  6/24/2025 8:11:57 PM  This report has been verified and signed electronically.  Dear patient,  As a result of recent federal legislation (The Federal Cures Act), you may   receive lab or pathology results from your procedure in your [facility]   account before your physician is able to contact you. Your physician or   their representative will relay the results to you with their   recommendations at their soonest availability.  Thank you,  PROVATION

## 2025-07-06 ENCOUNTER — PATIENT MESSAGE (OUTPATIENT)
Dept: GASTROENTEROLOGY | Facility: CLINIC | Age: 35
End: 2025-07-06
Payer: MEDICAID

## 2025-07-06 ENCOUNTER — RESULTS FOLLOW-UP (OUTPATIENT)
Dept: GASTROENTEROLOGY | Facility: CLINIC | Age: 35
End: 2025-07-06
Payer: MEDICAID

## 2025-07-06 DIAGNOSIS — R13.10 DYSPHAGIA, UNSPECIFIED TYPE: ICD-10-CM

## 2025-07-06 DIAGNOSIS — Q90.9 DOWN SYNDROME: ICD-10-CM

## 2025-07-06 DIAGNOSIS — K22.4 INEFFECTIVE ESOPHAGEAL MOTILITY: Primary | ICD-10-CM

## 2025-07-06 NOTE — PROGRESS NOTES
Florinda please refer Danae to our dietitian for help with dietary modifications referral placed    Florinda please refer Danae to speech for a modified barium swallow evaluation and treatment referral referral placed.    Onofre Fink has some nonspecific esophageal dysmotility where diet modification seems to be the best treatment options    Impression:            -Ineffective esophageal motility based on the                          [place] Classification of Esophageal Motility                          Disorders version 4.0.     For a 35-year-old female with Down syndrome who has dysphagia, ineffective esophageal motility (per [place] Classification v4.0), no evidence of eosinophilic esophagitis on EGD, and poor response to empiric esophageal dilations, the following medical and surgical management options should be considered:  Medical and Supportive Management  Swallowing Therapy:  Referral to a speech-language pathologist specializing in swallowing disorders is recommended. Swallowing therapy can help optimize safe swallowing techniques, compensate for motility deficits, and tailor strategies to the individual's needs. Therapy may include:  Postural adjustments (e.g., chin tuck, head turn)  Swallow maneuvers (e.g., effortful swallow)  Diet modifications (e.g., thickened liquids, softer foods)  Feeding strategies and pacing  Dietary Modifications:  Cutting food into smaller bites  Moistening food with sauces or gravies  Thickening liquids to reduce aspiration risk  Avoiding foods that are difficult to chew or swallow  Using cups or utensils that limit bolus size  Aspiration Precautions:  Monitor for signs of aspiration (coughing, choking, recurrent pneumonia)  Consider a modified barium swallow study to further assess risk and guide therapy  Feeding Assistance:  If oral intake is insufficient or unsafe despite therapy, enteral feeding (e.g., gastrostomy tube) may be considered, especially if there is  significant risk of aspiration or malnutrition.  Pharmacologic Therapy  There is no specific medication to treat ineffective esophageal motility itself.  Prokinetic agents (e.g., metoclopramide, domperidone) have limited efficacy and are generally not recommended due to side effects and lack of proven benefit for this motility pattern.  Acid suppression (e.g., PPIs) may be considered if there are symptoms or evidence of reflux, but this does not address motility.  Surgical Management  Surgical options are generally limited for ineffective esophageal motility, especially when there is no structural obstruction.  Esophageal dilation is not effective for motility disorders and, as in this case, has not provided benefit.  Fundoplication (anti-reflux surgery) is not indicated unless significant, refractory gastroesophageal reflux is present and contributing to symptoms.  Feeding tube placement (gastrostomy) may be considered if oral feeding is not safe or adequate.  Special Considerations in Down Syndrome  Individuals with Down syndrome often have anatomic and neuromuscular factors that complicate dysphagia management, so a multidisciplinary team approach (including speech therapy, nutrition, primary care, and gastroenterology) is essential.  Ongoing feeding and swallowing therapy is often required, and interventions should be highly individualized.  In summary:  The mainstays of management are swallowing therapy, dietary modification, and supportive care. There is no effective surgical or medical cure for ineffective esophageal motility in this context. If safe oral feeding cannot be achieved, enteral feeding should be considered. Collaboration with a multidisciplinary team is crucial for optimal care.

## 2025-07-18 ENCOUNTER — TELEPHONE (OUTPATIENT)
Dept: SPEECH THERAPY | Facility: HOSPITAL | Age: 35
End: 2025-07-18
Payer: MEDICAID

## 2025-07-18 NOTE — TELEPHONE ENCOUNTER
Sw mom to schedule mbss, she stated she was at the doctors office and would call on Monday. Mychart msg sent with contact information

## 2025-07-21 ENCOUNTER — TELEPHONE (OUTPATIENT)
Dept: SPEECH THERAPY | Facility: HOSPITAL | Age: 35
End: 2025-07-21
Payer: MEDICAID

## 2025-07-21 NOTE — TELEPHONE ENCOUNTER
Sw pt mother to schedule mbss 8/14@2. Informed to fast 2hrs before test, 2nd fl radiology clinic at the main [place].

## 2025-08-14 ENCOUNTER — CLINICAL SUPPORT (OUTPATIENT)
Dept: SPEECH THERAPY | Facility: HOSPITAL | Age: 35
End: 2025-08-14
Attending: INTERNAL MEDICINE
Payer: MEDICAID

## 2025-08-14 ENCOUNTER — HOSPITAL ENCOUNTER (OUTPATIENT)
Dept: RADIOLOGY | Facility: HOSPITAL | Age: 35
Discharge: HOME | End: 2025-08-14
Attending: INTERNAL MEDICINE
Payer: MEDICAID

## 2025-08-14 DIAGNOSIS — Q90.9 DOWN SYNDROME: ICD-10-CM

## 2025-08-14 DIAGNOSIS — R13.10 DYSPHAGIA, UNSPECIFIED TYPE: ICD-10-CM

## 2025-08-14 DIAGNOSIS — K22.4 INEFFECTIVE ESOPHAGEAL MOTILITY: ICD-10-CM

## 2025-08-14 DIAGNOSIS — K22.4 INEFFECTIVE ESOPHAGEAL MOTILITY: Primary | ICD-10-CM

## 2025-08-14 PROCEDURE — 74230 X-RAY XM SWLNG FUNCJ C+: CPT | Mod: TC,FY

## 2025-08-14 PROCEDURE — 74230 X-RAY XM SWLNG FUNCJ C+: CPT | Mod: 26,,, | Performed by: RADIOLOGY

## 2025-08-14 PROCEDURE — 25500020 PHARM REV CODE 255: Performed by: INTERNAL MEDICINE

## 2025-08-14 PROCEDURE — A9698 NON-RAD CONTRAST MATERIALNOC: HCPCS | Performed by: INTERNAL MEDICINE

## 2025-08-14 PROCEDURE — 92610 EVALUATE SWALLOWING FUNCTION: CPT | Mod: GN

## 2025-08-14 PROCEDURE — 92611 MOTION FLUOROSCOPY/SWALLOW: CPT | Mod: GN

## 2025-08-14 RX ADMIN — BARIUM SULFATE 60 ML: 0.81 POWDER, FOR SUSPENSION ORAL at 02:08

## 2025-08-23 ENCOUNTER — PATIENT MESSAGE (OUTPATIENT)
Dept: GASTROENTEROLOGY | Facility: CLINIC | Age: 35
End: 2025-08-23
Payer: MEDICAID